# Patient Record
Sex: FEMALE | Race: WHITE | Employment: FULL TIME | URBAN - NONMETROPOLITAN AREA
[De-identification: names, ages, dates, MRNs, and addresses within clinical notes are randomized per-mention and may not be internally consistent; named-entity substitution may affect disease eponyms.]

---

## 2017-01-06 ENCOUNTER — OFFICE VISIT (OUTPATIENT)
Dept: FAMILY MEDICINE | Facility: OTHER | Age: 52
End: 2017-01-06
Attending: PHYSICIAN ASSISTANT
Payer: COMMERCIAL

## 2017-01-06 VITALS
RESPIRATION RATE: 18 BRPM | OXYGEN SATURATION: 98 % | SYSTOLIC BLOOD PRESSURE: 134 MMHG | DIASTOLIC BLOOD PRESSURE: 74 MMHG | HEIGHT: 65 IN | TEMPERATURE: 97.8 F | HEART RATE: 80 BPM

## 2017-01-06 DIAGNOSIS — I10 BENIGN ESSENTIAL HYPERTENSION: ICD-10-CM

## 2017-01-06 DIAGNOSIS — E66.01 MORBID OBESITY DUE TO EXCESS CALORIES (H): ICD-10-CM

## 2017-01-06 DIAGNOSIS — J40 BRONCHITIS: ICD-10-CM

## 2017-01-06 DIAGNOSIS — Z71.89 ACP (ADVANCE CARE PLANNING): Primary | Chronic | ICD-10-CM

## 2017-01-06 PROCEDURE — 99213 OFFICE O/P EST LOW 20 MIN: CPT | Performed by: PHYSICIAN ASSISTANT

## 2017-01-06 RX ORDER — PHENTERMINE HYDROCHLORIDE 30 MG/1
30 CAPSULE ORAL EVERY MORNING
Qty: 30 CAPSULE | Refills: 3 | Status: SHIPPED | OUTPATIENT
Start: 2017-01-06 | End: 2017-07-07

## 2017-01-06 RX ORDER — CODEINE PHOSPHATE AND GUAIFENESIN 10; 100 MG/5ML; MG/5ML
2 SOLUTION ORAL EVERY 4 HOURS PRN
Qty: 180 ML | Refills: 0 | Status: SHIPPED | OUTPATIENT
Start: 2017-01-06 | End: 2017-03-06

## 2017-01-06 RX ORDER — HYDROCHLOROTHIAZIDE 25 MG/1
TABLET ORAL
Qty: 90 TABLET | Refills: 0 | Status: SHIPPED | OUTPATIENT
Start: 2017-01-06 | End: 2017-07-06

## 2017-01-06 RX ORDER — AZITHROMYCIN 250 MG/1
TABLET, FILM COATED ORAL
Qty: 6 TABLET | Refills: 0 | Status: SHIPPED | OUTPATIENT
Start: 2017-01-06 | End: 2017-03-06

## 2017-01-06 RX ORDER — METOPROLOL SUCCINATE 50 MG/1
TABLET, EXTENDED RELEASE ORAL
Qty: 90 TABLET | Refills: 0 | Status: SHIPPED | OUTPATIENT
Start: 2017-01-06 | End: 2017-07-06

## 2017-01-06 ASSESSMENT — PAIN SCALES - GENERAL: PAINLEVEL: MODERATE PAIN (4)

## 2017-01-06 NOTE — MR AVS SNAPSHOT
"              After Visit Summary   1/6/2017    Nadeen Robles    MRN: [id number]           Patient Information     Date Of Birth          1965        Visit Information        Provider Department      1/6/2017 4:00 PM Sunitha Lindsey PA [facility]        Today's Diagnoses     ACP (advance care planning)    -  1     Benign essential hypertension         Morbid obesity due to excess calories (H)         Bronchitis           Care Instructions    Follow up if symptoms persist or worsen.          Follow-ups after your visit        Your next 10 appointments already scheduled     Jan 06, 2017  4:00 PM   (Arrive by 3:45 PM)   SHORT with RENETTA Stallworth   [facility]    [address] [address]   [phone]              Who to contact     If you have questions or need follow up information about today's clinic visit or your schedule please contact [facility] directly at [phone].  Normal or non-critical lab and imaging results will be communicated to you by MyChart, letter or phone within 4 business days after the clinic has received the results. If you do not hear from us within 7 days, please contact the clinic through Escom[employer] or phone. If you have a critical or abnormal lab result, we will notify you by phone as soon as possible.  Submit refill requests through [employer] or call your pharmacy and they will forward the refill request to us. Please allow 3 business days for your refill to be completed.          Additional Information About Your Visit        Escomhart Information     [employer] lets you send messages to your doctor, view your test results, renew your prescriptions, schedule appointments and more. To sign up, go to [email or web address] . Click on \"Log in\" on the left side of the screen, which will take you to the Welcome page. Then click on \"Sign up Now\" on the right side of the page.     You will be asked to " "enter the access code listed below, as well as some personal information. Please follow the directions to create your username and password.     Your access code is: [email or web address]  Expires: 2017  3:46 PM     Your access code will  in 90 days. If you need help or a new code, please call your [facility] clinic or [phone].        Care EveryWhere ID     This is your Care EveryWhere ID. This could be used by other organizations to access your [facility] medical records  [id number]        Your Vitals Were     Pulse Temperature Respirations Height Pulse Oximetry       80 97.8  F (36.6  C) (Tympanic) 18 5' 5\" (1.651 m) 98%        Blood Pressure from Last 3 Encounters:   17 134/74   16 120/66   10/02/15 136/76    Weight from Last 3 Encounters:   16 179 lb (81.194 kg)   10/02/15 202 lb (91.627 kg)   02/23/15 194 lb (87.998 kg)              Today, you had the following     No orders found for display         Today's Medication Changes          These changes are accurate as of: 17  3:46 PM.  If you have any questions, ask your nurse or doctor.               Start taking these medicines.        Dose/Directions    azithromycin 250 MG tablet   Commonly known as:  ZITHROMAX   Used for:  Bronchitis   Started by:  Sunitha Lindsey PA        Two tablets first day, then one tablet daily for four days.   Quantity:  6 tablet   Refills:  0       guaiFENesin-codeine 100-10 MG/5ML Soln solution   Commonly known as:  ROBITUSSIN AC   Used for:  Bronchitis   Started by:  Sunitha Lindsey PA        Dose:  2 tsp.   Take 10 mLs by mouth every 4 hours as needed   Quantity:  180 mL   Refills:  0         These medicines have changed or have updated prescriptions.        Dose/Directions    hydrochlorothiazide 25 MG tablet   Commonly known as:  HYDRODIURIL   This may have changed:  See the new instructions.   Used for:  Benign essential hypertension   Changed by:  Sunitha Lindsey PA        TAKE 1 TABLET DAILY " BY MOUTH   Quantity:  90 tablet   Refills:  0       metoprolol 50 MG 24 hr tablet   Commonly known as:  TOPROL-XL   This may have changed:  See the new instructions.   Used for:  Benign essential hypertension   Changed by:  Sunitha Lindsey PA        TAKE 1 TABLET (50MG) BY MOUTH DAILY.   Quantity:  90 tablet   Refills:  0            Where to get your medicines      These medications were sent to [id number] - [place], [place] - [address]  [address] [place] [address]     Phone:  [phone]    - azithromycin 250 MG tablet  - hydrochlorothiazide 25 MG tablet  - metoprolol 50 MG 24 hr tablet      Some of these will need a paper prescription and others can be bought over the counter.  Ask your nurse if you have questions.     Bring a paper prescription for each of these medications    - guaiFENesin-codeine 100-10 MG/5ML Soln solution  - phentermine 30 MG capsule             Primary Care Provider Office Phone # Fax #    RENETTA Stallworth [phone]       [facility] [address] [facility]  [place] [address]        Thank you!     Thank you for choosing [facility]  for your care. Our goal is always to provide you with excellent care. Hearing back from our patients is one way we can continue to improve our services. Please take a few minutes to complete the written survey that you may receive in the mail after your visit with us. Thank you!             Your Updated Medication List - Protect others around you: Learn how to safely use, store and throw away your medicines at www.disposemymeds.org.          This list is accurate as of: 1/6/17  3:46 PM.  Always use your most recent med list.                   Brand Name Dispense Instructions for use    azithromycin 250 MG tablet    ZITHROMAX    6 tablet    Two tablets first day, then one tablet daily for four days.       guaiFENesin-codeine 100-10 MG/5ML Soln solution    ROBITUSSIN AC    180 mL    Take 10 mLs by  mouth every 4 hours as needed       hydrochlorothiazide 25 MG tablet    HYDRODIURIL    90 tablet    TAKE 1 TABLET DAILY BY MOUTH       metoprolol 50 MG 24 hr tablet    TOPROL-XL    90 tablet    TAKE 1 TABLET (50MG) BY MOUTH DAILY.       phentermine 30 MG capsule     30 capsule    Take 1 capsule (30 mg) by mouth every morning

## 2017-01-06 NOTE — NURSING NOTE
"Chief Complaint   Patient presents with     URI     cough, sinus pressure and body aches for 2 weeks     *_* Health Care Directive *_*     paperwork given     Refill Request     needs all meds refilled (meds pended)       Initial /74 mmHg  Pulse 80  Temp(Src) 97.8  F (36.6  C) (Tympanic)  Resp 18  Ht 5' 5\" (1.651 m)  Wt   SpO2 98% Estimated body mass index is 29.79 kg/(m^2) as calculated from the following:    Height as of this encounter: 5' 5\" (1.651 m).    Weight as of 2/4/16: 179 lb (81.194 kg).  BP completed using cuff size: shanta Mckenna LPN      "

## 2017-01-06 NOTE — PROGRESS NOTES
Chief complaint:   Chief Complaint   Patient presents with     URI     cough, sinus pressure and body aches for 2 weeks     *_* Health Care Directive *_*     paperwork given     Refill Request     needs all meds refilled (meds pended)       Subjective: Nadeen Robles is a 51 year old female who presents with a  2 week  history of ST, nasal congestion, plugged ears, cough, SOB, wheeze. Chills and fever. Nausea but no vomiting, diarrhea.    PMH, PSH, FH reviewed and unchanged.    Current Outpatient Prescriptions   Medication Sig Dispense Refill     metoprolol (TOPROL-XL) 50 MG 24 hr tablet TAKE 1 TABLET (50MG) BY MOUTH DAILY. 90 tablet 0     hydrochlorothiazide (HYDRODIURIL) 25 MG tablet TAKE 1 TABLET DAILY BY MOUT H 90 tablet 0     phentermine 30 MG capsule Take 1 capsule (30 mg) by mouth every morning 30 capsule 3      Allergies   Allergen Reactions     Nkda [No Known Drug Allergies]        Review Of Systems  Skin: Denies rash  Eyes: Denies redness or discharge   Ears/Nose/Throat:as above  Respiratory: as above  Cardiovascular: Denies chest pain or palpitations   Gastrointestinal:Denies nausea, vomiting, diarrhea   Musculoskeletal: No myalgias    Objective:   B/P: 134/74, T: 97.8, P: 80, R: 18    Physical Exam  General: Alert orientated. NAD  Skin is unremarkable.  HEENT:Posterior pharynx erythematous. EAC's clear. Right TM intact. Left TM intact. Neck supple. Anterior cervical lymphadenopathy palpable.   Lungs: Scattered rhonchi throughout. No wheeze, rales   Cardiac: RRR    Assessment:   (Z71.89) ACP (advance care planning)  (primary encounter diagnosis)      (I10) Benign essential hypertension  Comment: refill. Return for physical  Plan: metoprolol (TOPROL-XL) 50 MG 24 hr tablet,         hydrochlorothiazide (HYDRODIURIL) 25 MG tablet            (E66.01) Morbid obesity due to excess calories (H)  Comment: refill  Plan: phentermine 30 MG capsule            (J40) Bronchit  Plan: azithromycin (ZITHROMAX) 250 MG  tablet,         guaiFENesin-codeine (ROBITUSSIN AC) 100-10         MG/5ML SOLN solution            Rest. Increase fluids. Tylenol for fever or discomfort. Return if symptoms persist or worsen.    Sunitha Lindsey PA-C

## 2017-03-06 ENCOUNTER — OFFICE VISIT (OUTPATIENT)
Dept: FAMILY MEDICINE | Facility: OTHER | Age: 52
End: 2017-03-06
Attending: FAMILY MEDICINE
Payer: COMMERCIAL

## 2017-03-06 VITALS
BODY MASS INDEX: 33.66 KG/M2 | SYSTOLIC BLOOD PRESSURE: 115 MMHG | RESPIRATION RATE: 16 BRPM | DIASTOLIC BLOOD PRESSURE: 76 MMHG | TEMPERATURE: 97.4 F | HEIGHT: 65 IN | WEIGHT: 202 LBS | HEART RATE: 74 BPM | OXYGEN SATURATION: 96 %

## 2017-03-06 DIAGNOSIS — J01.00 ACUTE NON-RECURRENT MAXILLARY SINUSITIS: Primary | ICD-10-CM

## 2017-03-06 PROCEDURE — 99213 OFFICE O/P EST LOW 20 MIN: CPT | Performed by: FAMILY MEDICINE

## 2017-03-06 ASSESSMENT — PAIN SCALES - GENERAL: PAINLEVEL: NO PAIN (0)

## 2017-03-06 NOTE — MR AVS SNAPSHOT
"              After Visit Summary   3/6/2017    Nadeen Robles    MRN: [id number]           Patient Information     Date Of Birth          1965        Visit Information        Provider Department      3/6/2017 9:30 AM Cristian Gamez MD [facility]        Today's Diagnoses     Acute non-recurrent maxillary sinusitis    -  1      Care Instructions    F/u with ongoing concerns.         Follow-ups after your visit        Who to contact     If you have questions or need follow up information about today's clinic visit or your schedule please contact [facility] directly at [phone].  Normal or non-critical lab and imaging results will be communicated to you by NeuroTronikhart, letter or phone within 4 business days after the clinic has received the results. If you do not hear from us within 7 days, please contact the clinic through [employer]Tronikh[employer] or phone. If you have a critical or abnormal lab result, we will notify you by phone as soon as possible.  Submit refill requests through [employer] or call your pharmacy and they will forward the refill request to us. Please allow 3 business days for your refill to be completed.          Additional Information About Your Visit        MyChart Information     [employer] lets you send messages to your doctor, view your test results, renew your prescriptions, schedule appointments and more. To sign up, go to [email or web address] . Click on \"Log in\" on the left side of the screen, which will take you to the Welcome page. Then click on \"Sign up Now\" on the right side of the page.     You will be asked to enter the access code listed below, as well as some personal information. Please follow the directions to create your username and password.     Your access code is: [email or web address]  Expires: 2017  3:46 PM     Your access code will  in 90 days. If you need help or a new code, please call your [facility] or [phone].        Care EveryWhere " "ID     This is your Care EveryWhere ID. This could be used by other organizations to access your [facility] medical records  [id number]        Your Vitals Were     Pulse Temperature Respirations Height Pulse Oximetry BMI (Body Mass Index)    74 97.4  F (36.3  C) (Tympanic) 16 5' 5\" (1.651 m) 96% 33.61 kg/m2       Blood Pressure from Last 3 Encounters:   03/06/17 115/76   01/06/17 134/74   02/04/16 120/66    Weight from Last 3 Encounters:   03/06/17 202 lb (91.6 kg)   02/04/16 179 lb (81.2 kg)   10/02/15 202 lb (91.6 kg)              Today, you had the following     No orders found for display         Today's Medication Changes          These changes are accurate as of: 3/6/17  9:39 AM.  If you have any questions, ask your nurse or doctor.               Start taking these medicines.        Dose/Directions    amoxicillin-clavulanate 875-125 MG per tablet   Commonly known as:  AUGMENTIN   Used for:  Acute non-recurrent maxillary sinusitis   Started by:  Cristian Gamez MD        Dose:  1 tablet   Take 1 tablet by mouth 2 times daily   Quantity:  20 tablet   Refills:  0            Where to get your medicines      These medications were sent to [id number] - [place] - [address]  [address] [address]     Phone:  [phone]     amoxicillin-clavulanate 875-125 MG per tablet                Primary Care Provider Office Phone # Fax #    RENETTA Stallworth [phone]       [facility] [place] [address]  [place] [address]        Thank you!     Thank you for choosing [facility]3]  for your care. Our goal is always to provide you with excellent care. Hearing back from our patients is one way we can continue to improve our services. Please take a few minutes to complete the written survey that you may receive in the mail after your visit with us. Thank you!             Your Updated Medication List - Protect others around you: Learn how to safely " use, store and throw away your medicines at www.disposemymeds.org.          This list is accurate as of: 3/6/17  9:39 AM.  Always use your most recent med list.                   Brand Name Dispense Instructions for use    amoxicillin-clavulanate 875-125 MG per tablet    AUGMENTIN    20 tablet    Take 1 tablet by mouth 2 times daily       hydrochlorothiazide 25 MG tablet    HYDRODIURIL    90 tablet    TAKE 1 TABLET DAILY BY MOUTH       metoprolol 50 MG 24 hr tablet    TOPROL-XL    90 tablet    TAKE 1 TABLET (50MG) BY MOUTH DAILY.       phentermine 30 MG capsule     30 capsule    Take 1 capsule (30 mg) by mouth every morning

## 2017-03-06 NOTE — NURSING NOTE
"Chief Complaint   Patient presents with     URI     Pt has a cough and stuffy head with bloody sinus drainage for 3 days. Pt had Bronchitis in January and this never completely cleared up. The cough had cleared up but the head congestion continued.       Initial /76 (BP Location: Right arm, Patient Position: Chair, Cuff Size: Adult Regular)  Pulse 74  Temp 97.4  F (36.3  C) (Tympanic)  Resp 16  Ht 5' 5\" (1.651 m)  Wt 202 lb (91.6 kg)  SpO2 96%  BMI 33.61 kg/m2 Estimated body mass index is 33.61 kg/(m^2) as calculated from the following:    Height as of this encounter: 5' 5\" (1.651 m).    Weight as of this encounter: 202 lb (91.6 kg).  Medication Reconciliation: complete   Jonna Evangelista    "

## 2017-03-06 NOTE — PROGRESS NOTES
Nadeen Robles    March 6, 2017    Chief Complaint   Patient presents with     URI     Pt has a cough and stuffy head with bloody sinus drainage for 3 days. Pt had Bronchitis in January and this never completely cleared up. The cough had cleared up but the head congestion continued.       SUBJECTIVE:  Here for ongoing URI sx.  Pain and cough at night sometimes.  Very congested.  No sob.  Finished zpac about a month ago, slightly better but now back.      Past Medical History   Diagnosis Date     Autoimmune hepatitis (H)      HTN (hypertension)      Migraine      Obesity        Past Surgical History   Procedure Laterality Date     C removal gallbladder  1990     Cholecystectomy  1991       Current Outpatient Prescriptions   Medication Sig Dispense Refill     amoxicillin-clavulanate (AUGMENTIN) 875-125 MG per tablet Take 1 tablet by mouth 2 times daily 20 tablet 0     metoprolol (TOPROL-XL) 50 MG 24 hr tablet TAKE 1 TABLET (50MG) BY MOUTH DAILY. 90 tablet 0     hydrochlorothiazide (HYDRODIURIL) 25 MG tablet TAKE 1 TABLET DAILY BY MOUTH 90 tablet 0     phentermine 30 MG capsule Take 1 capsule (30 mg) by mouth every morning 30 capsule 3       Allergies   Allergen Reactions     Nkda [No Known Drug Allergies]        Family History   Problem Relation Age of Onset     DIABETES Mother      DIABETES Father      CEREBROVASCULAR DISEASE Father 75     Cardiovascular Father 75     heart attack       Social History     Social History     Marital status:      Spouse name: N/A     Number of children: N/A     Years of education: N/A     Occupational History     Not on file.     Social History Main Topics     Smoking status: Never Smoker     Smokeless tobacco: Never Used      Comment: no 2nd hand smoke exposure     Alcohol use Yes      Comment: rarely     Drug use: No     Sexual activity: Yes     Partners: Male     Other Topics Concern     Caffeine Concern No     Social History Narrative       5 point ROS negative except as  noted above in HPI, including Gen., Resp., CV, GI &  system review.     OBJECTIVE:  B/P: 115/76, T: 97.4, P: 74, R: 16    GENERAL APPEARANCE: Alert, no acute distress  HEENT: entirely normal.   CV: regular rate and rhythm, no murmur, rub or gallop  RESP: lungs clear to auscultation bilaterally  ABDOMEN: normal bowel sounds, soft, nontender, no hepatosplenomegaly or other masses  SKIN: no suspicious lesions or rashes to visualized skin  NEURO: Alert, oriented x 3, speech and mentation normal    ASSESSMENT and PLAN:  (J01.00) Acute non-recurrent maxillary sinusitis  (primary encounter diagnosis)  Comment: discussed.    Plan: amoxicillin-clavulanate (AUGMENTIN) 875-125 MG         per tablet        Suspect this as cause of sx.  Discussed tx course.  augmentin and follow up with ongoing concerns.  Stable presentation otherwise.

## 2017-03-14 ENCOUNTER — TELEPHONE (OUTPATIENT)
Dept: FAMILY MEDICINE | Facility: OTHER | Age: 52
End: 2017-03-14

## 2017-03-14 ENCOUNTER — OFFICE VISIT (OUTPATIENT)
Dept: FAMILY MEDICINE | Facility: OTHER | Age: 52
End: 2017-03-14
Attending: NURSE PRACTITIONER
Payer: COMMERCIAL

## 2017-03-14 VITALS
HEART RATE: 77 BPM | OXYGEN SATURATION: 98 % | WEIGHT: 200 LBS | SYSTOLIC BLOOD PRESSURE: 124 MMHG | BODY MASS INDEX: 33.32 KG/M2 | DIASTOLIC BLOOD PRESSURE: 80 MMHG | TEMPERATURE: 97.7 F | HEIGHT: 65 IN | RESPIRATION RATE: 22 BRPM

## 2017-03-14 DIAGNOSIS — J20.9 ACUTE BRONCHITIS, UNSPECIFIED ORGANISM: Primary | ICD-10-CM

## 2017-03-14 PROCEDURE — 99213 OFFICE O/P EST LOW 20 MIN: CPT | Performed by: NURSE PRACTITIONER

## 2017-03-14 RX ORDER — BENZONATATE 100 MG/1
100 CAPSULE ORAL 3 TIMES DAILY PRN
Qty: 42 CAPSULE | Refills: 0 | Status: SHIPPED | OUTPATIENT
Start: 2017-03-14 | End: 2017-07-07

## 2017-03-14 RX ORDER — PREDNISONE 20 MG/1
40 TABLET ORAL DAILY
Qty: 10 TABLET | Refills: 0 | Status: SHIPPED | OUTPATIENT
Start: 2017-03-14 | End: 2017-03-19

## 2017-03-14 ASSESSMENT — PAIN SCALES - GENERAL: PAINLEVEL: MILD PAIN (2)

## 2017-03-14 NOTE — PROGRESS NOTES
SUBJECTIVE:                                                    Nadeen Robles is a 51 year old female who presents to clinic today for the following health issues:      RESPIRATORY SYMPTOMS      Duration: one week    Description  nasal congestion, cough and mild chest and back pain andinto legs    Severity: moderate    Accompanying signs and symptoms: wheezing is new, and chest pressure    History (predisposing factors):  none    Precipitating or alleviating factors: None    Therapies tried and outcome:  Rupal Senior was seen and treated for a sinus infection last with with Augmentin. She states her sinus congestion is better, but she has this cough that will not stop and is worse at night. She is taking OTC cough medicine without any relief.    Problem list and histories reviewed & adjusted, as indicated.  Additional history: as documented    Patient Active Problem List   Diagnosis     Obesity     CARDIOVASCULAR SCREENING; LDL GOAL LESS THAN 160     Hypertension goal BP (blood pressure) < 140/90     Hypokalemia     ACP (advance care planning)     Past Surgical History   Procedure Laterality Date     C removal gallbladder  1990     Cholecystectomy  1991       Social History   Substance Use Topics     Smoking status: Never Smoker     Smokeless tobacco: Never Used      Comment: no 2nd hand smoke exposure     Alcohol use Yes      Comment: rarely     Family History   Problem Relation Age of Onset     DIABETES Mother      DIABETES Father      CEREBROVASCULAR DISEASE Father 75     Cardiovascular Father 75     heart attack         Current Outpatient Prescriptions   Medication Sig Dispense Refill     amoxicillin-clavulanate (AUGMENTIN) 875-125 MG per tablet Take 1 tablet by mouth 2 times daily 20 tablet 0     metoprolol (TOPROL-XL) 50 MG 24 hr tablet TAKE 1 TABLET (50MG) BY MOUTH DAILY. 90 tablet 0     hydrochlorothiazide (HYDRODIURIL) 25 MG tablet TAKE 1 TABLET DAILY BY MOUTH 90 tablet 0     phentermine 30  "MG capsule Take 1 capsule (30 mg) by mouth every morning 30 capsule 3     Allergies   Allergen Reactions     Nkda [No Known Drug Allergies]        Reviewed and updated as needed this visit by clinical staff  Tobacco  Allergies  Meds  Med Hx  Surg Hx  Fam Hx  Soc Hx      Reviewed and updated as needed this visit by Provider         ROS:  C: NEGATIVE for fever, chills, change in weight  ENT/MOUTH: Hx sinus infections  RESP:cough-non productive, SOB/dyspnea and wheezing  CV: NEGATIVE for chest pain, palpitations or peripheral edema    OBJECTIVE:                                                    /80 (BP Location: Right arm, Patient Position: Chair, Cuff Size: Adult Regular)  Pulse 77  Temp 97.7  F (36.5  C) (Tympanic)  Resp 22  Ht 5' 5\" (1.651 m)  Wt 200 lb (90.7 kg)  SpO2 98%  BMI 33.28 kg/m2  Body mass index is 33.28 kg/(m^2).   GENERAL: healthy, alert and no distress  NECK: no adenopathy, no asymmetry, masses, or scars and thyroid normal to palpation  RESP: lungs clear to auscultation - no rales, rhonchi or wheezes - decreased breath sounds bilateral bases  CV: regular rate and rhythm, normal S1 S2, no S3 or S4, no murmur, click or rub, no peripheral edema and peripheral pulses strong  ABDOMEN: soft, nontender, no hepatosplenomegaly, no masses and bowel sounds normal  PSYCH: mentation appears normal, affect normal/bright  LYMPH: normal ant/post cervical, supraclavicular nodes    Diagnostic Test Results:  none      ASSESSMENT:                                                        PLAN:                                                    ASSESSMENT / PLAN:  (J20.9) Acute bronchitis, unspecified organism  (primary encounter diagnosis)  Comment:   Plan:  predniSONE (DELTASONE) 20 MG tablet,          benzonatate (TESSALON) 100 MG capsule         Stay hydrated   Cool mist humidifier    Follow up if no improvement or worsening symptoms        Akila Gonzalez, JUAN PABLO [facility]    "

## 2017-03-14 NOTE — PATIENT INSTRUCTIONS
Acute Bronchitis  Your healthcare provider has told you that you have acute bronchitis. Bronchitis is infection or inflammation of the bronchial tubes (airways in the lungs). Normally, air moves easily in and out of the airways. Bronchitis narrows the airways, making it harder for air to flow in and out of the lungs. This causes symptoms such as shortness of breath, coughing, and wheezing. Bronchitis can be  acute  or  chronic.  Acute means the condition comes on quickly and goes away in a short time. Chronic means a condition lasts a long time and often comes back. Read on to learn more about acute bronchitis.    What causes acute bronchitis?  Acute bronchitis almost always starts as a viral respiratory infection, such as a cold or the flu. Certain factors make it more likely for a cold or flu to turn into bronchitis. These include being very young or very old or having a heart or lung problem. Cigarette smoking also makes bronchitis more likely.  When bronchitis develops, the airways become swollen. The airways may also become infected with bacteria. This is known as a secondary infection.  Diagnosing acute bronchitis  Your healthcare provider will examine you and ask about your symptoms and health history. You may also have a sputum culture to test the fluid in your lungs. Chest X-rays may be done to look for infection in the lungs.  Treating acute bronchitis  Bronchitis usually clears up as the cold or flu goes away. You can help feel better faster by doing the following:    Take medicine as directed. You may be told to take ibuprofen or other over-the-counter medicines. These help relieve inflammation in your bronchial tubes. Your doctor may prescribe an inhaler to help open up the bronchial tubes. Most of the time, acute bronchitis is caused by a viral infection. Antibiotics are usually not prescribed for viral infections.    Drink plenty of fluids, such as water, juice, or warm soup. Fluids loosen mucus so  that you can cough it up. This helps you breathe more easily. Fluids also prevent dehydration.    Make sure you get plenty of rest.    Do not smoke. Do not allow anyone else to smoke in your home.  Recovery and follow-up  Follow up with your doctor as you are told. You will likely feel better in a week or two. But a dry cough can linger beyond that time. Let your doctor know if you still have symptoms (other than a dry cough) after 2 weeks. If you re prone to getting bronchial infections, let your doctor know. And take steps to protect yourself from future infections. These steps include stopping smoking and avoiding tobacco smoke, washing your hands often, and getting a yearly flu shot.  When to call the doctor  Call the doctor if you have any of the following:    Fever of 100.4 F (38.0 C) higher    Symptoms that get worse, or new symptoms    Trouble breathing    Symptoms that don t start to improve within a week, or within 3 days of taking antibiotics     0221-1874 [employer]. [address] [place] [address], [place] [address]. All rights reserved. This information is not intended as a substitute for professional medical care. Always follow your healthcare professional's instructions.

## 2017-03-14 NOTE — TELEPHONE ENCOUNTER
I received an email on Cover My Meds that a Prior Auth needed to be completed for patient's Phentermine HCl 30mg caps.  I called the patient to let her know that we were trying to complete a Prior Auth for her med and I had a few questions for her to finish the form online and she said we didn't need to complete it because her insurance will not cover it and she pays for it out of pocket.  I deleted the PA request from Cover My Meds with a note stating the same thing.  No PA will be done at this time.  Venus Mckenna LPN

## 2017-03-14 NOTE — NURSING NOTE
"Estimated body mass index is 33.28 kg/(m^2) as calculated from the following:    Height as of this encounter: 5' 5\" (1.651 m).    Weight as of this encounter: 200 lb (90.7 kg).  BP Readings from Last 1 Encounters:   03/14/17 124/80   ]  BP cuff size:  regular  Do you feel safe in your environment? yes  Does the patient need any medication refills today? No  Tana Rey    "

## 2017-03-14 NOTE — MR AVS SNAPSHOT
After Visit Summary   3/14/2017    Nadeen Robles    MRN: [id number]           Patient Information     Date Of Birth          1965        Visit Information        Provider Department      3/14/2017 10:40 AM Akila Gonzalez APRN [facility]        Today's Diagnoses     Acute bronchitis, unspecified organism    -  1      Care Instructions      Acute Bronchitis  Your healthcare provider has told you that you have acute bronchitis. Bronchitis is infection or inflammation of the bronchial tubes (airways in the lungs). Normally, air moves easily in and out of the airways. Bronchitis narrows the airways, making it harder for air to flow in and out of the lungs. This causes symptoms such as shortness of breath, coughing, and wheezing. Bronchitis can be  acute  or  chronic.  Acute means the condition comes on quickly and goes away in a short time. Chronic means a condition lasts a long time and often comes back. Read on to learn more about acute bronchitis.    What causes acute bronchitis?  Acute bronchitis almost always starts as a viral respiratory infection, such as a cold or the flu. Certain factors make it more likely for a cold or flu to turn into bronchitis. These include being very young or very old or having a heart or lung problem. Cigarette smoking also makes bronchitis more likely.  When bronchitis develops, the airways become swollen. The airways may also become infected with bacteria. This is known as a secondary infection.  Diagnosing acute bronchitis  Your healthcare provider will examine you and ask about your symptoms and health history. You may also have a sputum culture to test the fluid in your lungs. Chest X-rays may be done to look for infection in the lungs.  Treating acute bronchitis  Bronchitis usually clears up as the cold or flu goes away. You can help feel better faster by doing the following:    Take medicine as directed. You may be told to take  ibuprofen or other over-the-counter medicines. These help relieve inflammation in your bronchial tubes. Your doctor may prescribe an inhaler to help open up the bronchial tubes. Most of the time, acute bronchitis is caused by a viral infection. Antibiotics are usually not prescribed for viral infections.    Drink plenty of fluids, such as water, juice, or warm soup. Fluids loosen mucus so that you can cough it up. This helps you breathe more easily. Fluids also prevent dehydration.    Make sure you get plenty of rest.    Do not smoke. Do not allow anyone else to smoke in your home.  Recovery and follow-up  Follow up with your doctor as you are told. You will likely feel better in a week or two. But a dry cough can linger beyond that time. Let your doctor know if you still have symptoms (other than a dry cough) after 2 weeks. If you re prone to getting bronchial infections, let your doctor know. And take steps to protect yourself from future infections. These steps include stopping smoking and avoiding tobacco smoke, washing your hands often, and getting a yearly flu shot.  When to call the doctor  Call the doctor if you have any of the following:    Fever of 100.4 F (38.0 C) higher    Symptoms that get worse, or new symptoms    Trouble breathing    Symptoms that don t start to improve within a week, or within 3 days of taking antibiotics     2541-2155 [employer]. [address] [place] [address], [place] [id number]. All rights reserved. This information is not intended as a substitute for professional medical care. Always follow your healthcare professional's instructions.              Follow-ups after your visit        Follow-up notes from your care team     Return if symptoms worsen or fail to improve.      Who to contact     If you have questions or need follow up information about today's clinic visit or your schedule please contact [facility] directly at [phone].  Normal or non-critical  "lab and imaging results will be communicated to you by MyChart, letter or phone within 4 business days after the clinic has received the results. If you do not hear from us within 7 days, please contact the clinic through [employer] or phone. If you have a critical or abnormal lab result, we will notify you by phone as soon as possible.  Submit refill requests through [employer] or call your pharmacy and they will forward the refill request to us. Please allow 3 business days for your refill to be completed.          Additional Information About Your Visit        [employer] Information     [employer] lets you send messages to your doctor, view your test results, renew your prescriptions, schedule appointments and more. To sign up, go to [email or web address] . Click on \"Log in\" on the left side of the screen, which will take you to the Welcome page. Then click on \"Sign up Now\" on the right side of the page.     You will be asked to enter the access code listed below, as well as some personal information. Please follow the directions to create your username and password.     Your access code is: [email or web address]  Expires: 2017  4:46 PM     Your access code will  in 90 days. If you need help or a new code, please call your [place] clinic or [phone].        Care EveryWhere ID     This is your Care EveryWhere ID. This could be used by other organizations to access your [place] medical records  [id number]        Your Vitals Were     Pulse Temperature Respirations Height Pulse Oximetry BMI (Body Mass Index)    77 97.7  F (36.5  C) (Tympanic) 22 5' 5\" (1.651 m) 98% 33.28 kg/m2       Blood Pressure from Last 3 Encounters:   17 124/80   17 115/76   17 134/74    Weight from Last 3 Encounters:   17 200 lb (90.7 kg)   17 202 lb (91.6 kg)   16 179 lb (81.2 kg)              Today, you had the following     No orders found for display         Today's Medication Changes          These " changes are accurate as of: 3/14/17 10:51 AM.  If you have any questions, ask your nurse or doctor.               Start taking these medicines.        Dose/Directions    benzonatate 100 MG capsule   Commonly known as:  TESSALON   Used for:  Acute bronchitis, unspecified organism   Started by:  Akila Gonzalez APRN CNP        Dose:  100 mg   Take 1 capsule (100 mg) by mouth 3 times daily as needed for cough   Quantity:  42 capsule   Refills:  0       predniSONE 20 MG tablet   Commonly known as:  DELTASONE   Used for:  Acute bronchitis, unspecified organism   Started by:  Akila Gonzalez APRN CNP        Dose:  40 mg   Take 2 tablets (40 mg) by mouth daily for 5 days   Quantity:  10 tablet   Refills:  0            Where to get your medicines      These medications were sent to [id number] - [place], [place] - [address]  [address], [place] [address]     Phone:  [phone]     benzonatate 100 MG capsule    predniSONE 20 MG tablet                Primary Care Provider Office Phone # Fax #    RENETTA Stallworth [phone]       [facility] [place] [address]  [place] [address]        Thank you!     Thank you for choosing [facility]  for your care. Our goal is always to provide you with excellent care. Hearing back from our patients is one way we can continue to improve our services. Please take a few minutes to complete the written survey that you may receive in the mail after your visit with us. Thank you!             Your Updated Medication List - Protect others around you: Learn how to safely use, store and throw away your medicines at www.disposemymeds.org.          This list is accurate as of: 3/14/17 10:51 AM.  Always use your most recent med list.                   Brand Name Dispense Instructions for use    amoxicillin-clavulanate 875-125 MG per tablet    AUGMENTIN    20 tablet    Take 1 tablet by mouth 2 times daily        benzonatate 100 MG capsule    TESSALON    42 capsule    Take 1 capsule (100 mg) by mouth 3 times daily as needed for cough       hydrochlorothiazide 25 MG tablet    HYDRODIURIL    90 tablet    TAKE 1 TABLET DAILY BY MOUTH       metoprolol 50 MG 24 hr tablet    TOPROL-XL    90 tablet    TAKE 1 TABLET (50MG) BY MOUTH DAILY.       phentermine 30 MG capsule     30 capsule    Take 1 capsule (30 mg) by mouth every morning       predniSONE 20 MG tablet    DELTASONE    10 tablet    Take 2 tablets (40 mg) by mouth daily for 5 days

## 2017-03-14 NOTE — TELEPHONE ENCOUNTER
7:36 AM  Patient is having the following symptoms: cough cant sleepThe patient is requesting an appointment for [facility] was seen last Monday possible bronchitis    Reason for Call: OVERBOOK    Was an appointment offered for this call?no  Preferred method for responding to this message: telephone call  If we cannot reach you directly, may we leave a detailed response at the number you provided? yes    Can this message wait until your PCP/provider returns, if unavailable today? liliana Manriquez

## 2017-03-16 ENCOUNTER — TRANSFERRED RECORDS (OUTPATIENT)
Dept: HEALTH INFORMATION MANAGEMENT | Facility: HOSPITAL | Age: 52
End: 2017-03-16

## 2017-03-16 ENCOUNTER — OFFICE VISIT (OUTPATIENT)
Dept: FAMILY MEDICINE | Facility: OTHER | Age: 52
End: 2017-03-16
Attending: FAMILY MEDICINE
Payer: COMMERCIAL

## 2017-03-16 VITALS
TEMPERATURE: 98.4 F | OXYGEN SATURATION: 97 % | HEART RATE: 79 BPM | RESPIRATION RATE: 18 BRPM | WEIGHT: 200 LBS | SYSTOLIC BLOOD PRESSURE: 124 MMHG | HEIGHT: 65 IN | DIASTOLIC BLOOD PRESSURE: 70 MMHG | BODY MASS INDEX: 33.32 KG/M2

## 2017-03-16 DIAGNOSIS — R05.9 COUGH: Primary | ICD-10-CM

## 2017-03-16 DIAGNOSIS — R11.10 POST-TUSSIVE EMESIS: ICD-10-CM

## 2017-03-16 LAB
BASOPHILS # BLD AUTO: 0 10E9/L (ref 0–0.2)
BASOPHILS NFR BLD AUTO: 0.4 %
DIFFERENTIAL METHOD BLD: ABNORMAL
EOSINOPHIL # BLD AUTO: 0 10E9/L (ref 0–0.7)
EOSINOPHIL NFR BLD AUTO: 0.2 %
ERYTHROCYTE [DISTWIDTH] IN BLOOD BY AUTOMATED COUNT: 13.9 % (ref 10–15)
HCT VFR BLD AUTO: 39.7 % (ref 35–47)
HGB BLD-MCNC: 13.5 G/DL (ref 11.7–15.7)
LYMPHOCYTES # BLD AUTO: 0.6 10E9/L (ref 0.8–5.3)
LYMPHOCYTES NFR BLD AUTO: 10.8 %
MCH RBC QN AUTO: 28.5 PG (ref 26.5–33)
MCHC RBC AUTO-ENTMCNC: 34 G/DL (ref 31.5–36.5)
MCV RBC AUTO: 84 FL (ref 78–100)
MISCELLANEOUS TEST: NORMAL
MONOCYTES # BLD AUTO: 0.5 10E9/L (ref 0–1.3)
MONOCYTES NFR BLD AUTO: 8.1 %
NEUTROPHILS # BLD AUTO: 4.5 10E9/L (ref 1.6–8.3)
NEUTROPHILS NFR BLD AUTO: 80.5 %
PLATELET # BLD AUTO: 250 10E9/L (ref 150–450)
RBC # BLD AUTO: 4.73 10E12/L (ref 3.8–5.2)
WBC # BLD AUTO: 5.6 10E9/L (ref 4–11)

## 2017-03-16 PROCEDURE — 85025 COMPLETE CBC W/AUTO DIFF WBC: CPT | Performed by: FAMILY MEDICINE

## 2017-03-16 PROCEDURE — 71020 ZZHC CHEST TWO VIEWS, FRONT/LAT: CPT | Mod: TC | Performed by: RADIOLOGY

## 2017-03-16 PROCEDURE — 99214 OFFICE O/P EST MOD 30 MIN: CPT | Performed by: FAMILY MEDICINE

## 2017-03-16 PROCEDURE — 36415 COLL VENOUS BLD VENIPUNCTURE: CPT | Performed by: FAMILY MEDICINE

## 2017-03-16 RX ORDER — ALBUTEROL SULFATE 90 UG/1
2 AEROSOL, METERED RESPIRATORY (INHALATION) EVERY 6 HOURS PRN
Qty: 1 INHALER | Refills: 0 | Status: SHIPPED | OUTPATIENT
Start: 2017-03-16 | End: 2017-07-07

## 2017-03-16 RX ORDER — AZITHROMYCIN 250 MG/1
TABLET, FILM COATED ORAL
Qty: 6 TABLET | Refills: 0 | Status: SHIPPED | OUTPATIENT
Start: 2017-03-16 | End: 2017-07-07

## 2017-03-16 ASSESSMENT — PAIN SCALES - GENERAL: PAINLEVEL: MODERATE PAIN (4)

## 2017-03-16 NOTE — NURSING NOTE
"Chief Complaint   Patient presents with     Cough     not better - was treated for bronchitis on 3/14/17       Initial /70 (BP Location: Right arm, Patient Position: Chair, Cuff Size: Adult Large)  Pulse 79  Temp 98.4  F (36.9  C) (Tympanic)  Resp 18  Ht 5' 5\" (1.651 m)  Wt 200 lb (90.7 kg)  SpO2 97%  BMI 33.28 kg/m2 Estimated body mass index is 33.28 kg/(m^2) as calculated from the following:    Height as of this encounter: 5' 5\" (1.651 m).    Weight as of this encounter: 200 lb (90.7 kg).  Medication Reconciliation: complete   Venus Mckenna LPN      "

## 2017-03-16 NOTE — PROGRESS NOTES
Nadeen Robles    March 16, 2017    Chief Complaint   Patient presents with     Cough     not better - was treated for bronchitis on 3/14/17       SUBJECTIVE:  Here for ongoing severe cough.  She coughed to the point of almost passing out.  She couldn't get her breath.  Then, she had a cough until she vomited.  Feels ok otherwise.  Just finished prednisone.  Was tx with augmentin though this as well.  Lengthy review today.      Past Medical History   Diagnosis Date     Autoimmune hepatitis (H)      HTN (hypertension)      Migraine      Obesity        Past Surgical History   Procedure Laterality Date     C removal gallbladder  1990     Cholecystectomy  1991       Current Outpatient Prescriptions   Medication Sig Dispense Refill     predniSONE (DELTASONE) 20 MG tablet Take 2 tablets (40 mg) by mouth daily for 5 days 10 tablet 0     benzonatate (TESSALON) 100 MG capsule Take 1 capsule (100 mg) by mouth 3 times daily as needed for cough 42 capsule 0     metoprolol (TOPROL-XL) 50 MG 24 hr tablet TAKE 1 TABLET (50MG) BY MOUTH DAILY. 90 tablet 0     hydrochlorothiazide (HYDRODIURIL) 25 MG tablet TAKE 1 TABLET DAILY BY MOUTH 90 tablet 0     phentermine 30 MG capsule Take 1 capsule (30 mg) by mouth every morning 30 capsule 3       Allergies   Allergen Reactions     Nkda [No Known Drug Allergies]        Family History   Problem Relation Age of Onset     DIABETES Mother      DIABETES Father      CEREBROVASCULAR DISEASE Father 75     Cardiovascular Father 75     heart attack       Social History     Social History     Marital status:      Spouse name: N/A     Number of children: N/A     Years of education: N/A     Occupational History     Not on file.     Social History Main Topics     Smoking status: Never Smoker     Smokeless tobacco: Never Used      Comment: no 2nd hand smoke exposure     Alcohol use Yes      Comment: rarely     Drug use: No     Sexual activity: Yes     Partners: Male     Other Topics Concern      Caffeine Concern No     Social History Narrative       5 point ROS negative except as noted above in HPI, including Gen., Resp., CV, GI &  system review.     OBJECTIVE:  B/P: 124/70, T: 98.4, P: 79, R: 18    GENERAL APPEARANCE: Alert, no acute distress  HEENT:  Normal.    CV: regular rate and rhythm, no murmur, rub or gallop  RESP: lungs clear to auscultation bilaterally  ABDOMEN: normal bowel sounds, soft, nontender, no hepatosplenomegaly or other masses  SKIN: no suspicious lesions or rashes to visualized skin  NEURO: Alert, oriented x 3, speech and mentation normal    ASSESSMENT and PLAN:  (R05) Cough  (primary encounter diagnosis)  Comment: ongoing, severe.  Plan: XR CHEST 2 VW (Clinic Performed), Bordetella         pert parapert DNA pcr        She has coughed to the point of almost passing out and, also post tussive emesis.  Getting pertussis checked.  Normal cbc and xray with question of granuloma RUL.  Await rad read.  Using azithro due to possibility of pertussis by history.  Normal sats and vitals otherwise.     (R11.10) Post-tussive emesis  Comment: as above.    Plan: XR CHEST 2 VW (Clinic Performed), Bordetella         pert parapert DNA pcr        As above.

## 2017-03-16 NOTE — MR AVS SNAPSHOT
"              After Visit Summary   3/16/2017    Nadeen Robles    MRN: [id number]           Patient Information     Date Of Birth          1965        Visit Information        Provider Department      3/16/2017 11:15 AM Cristian Gamez MD [facility]        Today's Diagnoses     Cough    -  1    Post-tussive emesis          Care Instructions    F/u with ongoing concerns.         Follow-ups after your visit        Who to contact     If you have questions or need follow up information about today's clinic visit or your schedule please contact [facility] directly at [phone].  Normal or non-critical lab and imaging results will be communicated to you by MyChart, letter or phone within 4 business days after the clinic has received the results. If you do not hear from us within 7 days, please contact the clinic through [employer] or phone. If you have a critical or abnormal lab result, we will notify you by phone as soon as possible.  Submit refill requests through [employer]up or call your pharmacy and they will forward the refill request to us. Please allow 3 business days for your refill to be completed.          Additional Information About Your Visit        MyChart Information     markedup lets you send messages to your doctor, view your test results, renew your prescriptions, schedule appointments and more. To sign up, go to [email or web address] . Click on \"Log in\" on the left side of the screen, which will take you to the Welcome page. Then click on \"Sign up Now\" on the right side of the page.     You will be asked to enter the access code listed below, as well as some personal information. Please follow the directions to create your username and password.     Your access code is: [email or web address]  Expires: 2017  4:46 PM     Your access code will  in 90 days. If you need help or a new code, please call your [facility] or [phone].        Care EveryWhere ID  " "   This is your Care EveryWhere ID. This could be used by other organizations to access your [place] medical records  [id number]        Your Vitals Were     Pulse Temperature Respirations Height Pulse Oximetry BMI (Body Mass Index)    79 98.4  F (36.9  C) (Tympanic) 18 5' 5\" (1.651 m) 97% 33.28 kg/m2       Blood Pressure from Last 3 Encounters:   03/16/17 124/70   03/14/17 124/80   03/06/17 115/76    Weight from Last 3 Encounters:   03/16/17 200 lb (90.7 kg)   03/14/17 200 lb (90.7 kg)   03/06/17 202 lb (91.6 kg)              We Performed the Following     Bordatella Pertussis: Laboratory Miscellaneous Order     CBC with platelets and differential     Send outs misc test     XR CHEST 2 VW (Clinic Performed)          Today's Medication Changes          These changes are accurate as of: 3/16/17 12:43 PM.  If you have any questions, ask your nurse or doctor.               Start taking these medicines.        Dose/Directions    albuterol 108 (90 BASE) MCG/ACT Inhaler   Commonly known as:  PROAIR HFA/PROVENTIL HFA/VENTOLIN HFA   Used for:  Cough   Started by:  Cristian Gamez MD        Dose:  2 puff   Inhale 2 puffs into the lungs every 6 hours as needed for shortness of breath / dyspnea or wheezing   Quantity:  1 Inhaler   Refills:  0       azithromycin 250 MG tablet   Commonly known as:  ZITHROMAX   Used for:  Cough, Post-tussive emesis   Started by:  Cristian Gamez MD        Two tablets first day, then one tablet daily for four days.   Quantity:  6 tablet   Refills:  0            Where to get your medicines      These medications were sent to [facility] - [place], [place] - [address]  [address] [place] [address]     Phone:  [phone]     albuterol 108 (90 BASE) MCG/ACT Inhaler    azithromycin 250 MG tablet                Primary Care Provider Office Phone # Fax #    RENETTA Stallworth [phone]       [facility] [place] [address] [place] [address]   "      Thank you!     Thank you for choosing [facility]  for your care. Our goal is always to provide you with excellent care. Hearing back from our patients is one way we can continue to improve our services. Please take a few minutes to complete the written survey that you may receive in the mail after your visit with us. Thank you!             Your Updated Medication List - Protect others around you: Learn how to safely use, store and throw away your medicines at www.disposemymeds.org.          This list is accurate as of: 3/16/17 12:43 PM.  Always use your most recent med list.                   Brand Name Dispense Instructions for use    albuterol 108 (90 BASE) MCG/ACT Inhaler    PROAIR HFA/PROVENTIL HFA/VENTOLIN HFA    1 Inhaler    Inhale 2 puffs into the lungs every 6 hours as needed for shortness of breath / dyspnea or wheezing       azithromycin 250 MG tablet    ZITHROMAX    6 tablet    Two tablets first day, then one tablet daily for four days.       benzonatate 100 MG capsule    TESSALON    42 capsule    Take 1 capsule (100 mg) by mouth 3 times daily as needed for cough       hydrochlorothiazide 25 MG tablet    HYDRODIURIL    90 tablet    TAKE 1 TABLET DAILY BY MOUTH       metoprolol 50 MG 24 hr tablet    TOPROL-XL    90 tablet    TAKE 1 TABLET (50MG) BY MOUTH DAILY.       phentermine 30 MG capsule     30 capsule    Take 1 capsule (30 mg) by mouth every morning       predniSONE 20 MG tablet    DELTASONE    10 tablet    Take 2 tablets (40 mg) by mouth daily for 5 days

## 2017-03-20 ENCOUNTER — TELEPHONE (OUTPATIENT)
Dept: FAMILY MEDICINE | Facility: OTHER | Age: 52
End: 2017-03-20

## 2017-03-20 NOTE — TELEPHONE ENCOUNTER
9:04 AM    Reason for Call: Phone Call    Description: Tracy states that she needs a work excuse for March  15 - 17 .  Please call Tracy when this is ready.  She Saw Dr. Gamez and Akila Gonzalez    Was an appointment offered for this call? No    Preferred method for responding to this message: [phone]    If we cannot reach you directly, may we leave a detailed response at the number you provided? Yes      Claudette Muhammad

## 2017-03-20 NOTE — LETTER
[facility]  [address]  [facility] [address]  [phone]  Dept: [phone]      3/20/2017    Re: Nadeen Robles      TO WHOM IT MAY CONCERN:    Nadeen Robles  was seen on 06/3/16/17.  Please excuse her  From 03/15/17 - 03/17/17 due to illness.    Cordially,    Dr AMBER Gamez/Yamileth  [facility]

## 2017-03-21 ENCOUNTER — TELEPHONE (OUTPATIENT)
Dept: FAMILY MEDICINE | Facility: OTHER | Age: 52
End: 2017-03-21

## 2017-03-22 ENCOUNTER — TELEPHONE (OUTPATIENT)
Dept: FAMILY MEDICINE | Facility: OTHER | Age: 52
End: 2017-03-22

## 2017-03-22 NOTE — TELEPHONE ENCOUNTER
Sorry we do not have any appt's available today. We can see her at 10:45 am tomorrow, arriving at 10:30 am, or she could go to  today if needed.

## 2017-03-22 NOTE — TELEPHONE ENCOUNTER
2:54 PM    Reason for Call: OVERBOOK    Patient is having the following symptoms: still not feeing well for 2 weeks.    The patient is requesting an appointment for Now  with Dr Gamez.    Was an appointment offered for this call? No    Preferred method for responding to this message: Telephone Call    If we cannot reach you directly, may we leave a detailed response at the number you provided? No    Can this message wait until your PCP/provider returns, if unavailable today?     Jonna Velazco

## 2017-03-22 NOTE — TELEPHONE ENCOUNTER
Called LM about available appt tomorrow Thursday 3/23/17 10:45am arrive 10:30am Dr Franco Culver

## 2017-03-28 LAB
LOCATION PERFORMED: NORMAL
RESULT: NORMAL
SEND OUTS MISC TEST CODE: NORMAL
SEND OUTS MISC TEST SPECIMEN: NORMAL
TEST NAME: NORMAL

## 2017-07-06 DIAGNOSIS — I10 BENIGN ESSENTIAL HYPERTENSION: ICD-10-CM

## 2017-07-07 ENCOUNTER — OFFICE VISIT (OUTPATIENT)
Dept: FAMILY MEDICINE | Facility: OTHER | Age: 52
End: 2017-07-07
Attending: PHYSICIAN ASSISTANT
Payer: COMMERCIAL

## 2017-07-07 VITALS
HEART RATE: 64 BPM | BODY MASS INDEX: 30.49 KG/M2 | HEIGHT: 65 IN | SYSTOLIC BLOOD PRESSURE: 106 MMHG | WEIGHT: 183 LBS | OXYGEN SATURATION: 98 % | DIASTOLIC BLOOD PRESSURE: 64 MMHG | TEMPERATURE: 97.3 F | RESPIRATION RATE: 16 BRPM

## 2017-07-07 DIAGNOSIS — E66.01 MORBID OBESITY DUE TO EXCESS CALORIES (H): ICD-10-CM

## 2017-07-07 DIAGNOSIS — I10 BENIGN ESSENTIAL HYPERTENSION: ICD-10-CM

## 2017-07-07 PROCEDURE — 99213 OFFICE O/P EST LOW 20 MIN: CPT | Performed by: PHYSICIAN ASSISTANT

## 2017-07-07 RX ORDER — HYDROCHLOROTHIAZIDE 25 MG/1
25 TABLET ORAL DAILY
Qty: 90 TABLET | Refills: 3 | Status: SHIPPED | OUTPATIENT
Start: 2017-07-07 | End: 2018-01-30 | Stop reason: ALTCHOICE

## 2017-07-07 RX ORDER — HYDROCHLOROTHIAZIDE 25 MG/1
TABLET ORAL
Qty: 30 TABLET | Refills: 0 | Status: SHIPPED | OUTPATIENT
Start: 2017-07-07 | End: 2017-07-07

## 2017-07-07 RX ORDER — PHENTERMINE HYDROCHLORIDE 30 MG/1
30 CAPSULE ORAL EVERY MORNING
Qty: 30 CAPSULE | Refills: 5 | Status: SHIPPED | OUTPATIENT
Start: 2017-07-07 | End: 2018-01-31

## 2017-07-07 RX ORDER — METOPROLOL SUCCINATE 50 MG/1
TABLET, EXTENDED RELEASE ORAL
Qty: 90 TABLET | Refills: 2 | Status: SHIPPED | OUTPATIENT
Start: 2017-07-07 | End: 2017-07-07

## 2017-07-07 RX ORDER — METOPROLOL SUCCINATE 50 MG/1
50 TABLET, EXTENDED RELEASE ORAL DAILY
Qty: 90 TABLET | Refills: 3 | Status: SHIPPED | OUTPATIENT
Start: 2017-07-07 | End: 2018-01-30

## 2017-07-07 ASSESSMENT — PAIN SCALES - GENERAL: PAINLEVEL: NO PAIN (0)

## 2017-07-07 ASSESSMENT — ANXIETY QUESTIONNAIRES
1. FEELING NERVOUS, ANXIOUS, OR ON EDGE: NOT AT ALL
7. FEELING AFRAID AS IF SOMETHING AWFUL MIGHT HAPPEN: NOT AT ALL
2. NOT BEING ABLE TO STOP OR CONTROL WORRYING: NOT AT ALL
IF YOU CHECKED OFF ANY PROBLEMS ON THIS QUESTIONNAIRE, HOW DIFFICULT HAVE THESE PROBLEMS MADE IT FOR YOU TO DO YOUR WORK, TAKE CARE OF THINGS AT HOME, OR GET ALONG WITH OTHER PEOPLE: NOT DIFFICULT AT ALL
3. WORRYING TOO MUCH ABOUT DIFFERENT THINGS: NOT AT ALL
GAD7 TOTAL SCORE: 0
5. BEING SO RESTLESS THAT IT IS HARD TO SIT STILL: NOT AT ALL
6. BECOMING EASILY ANNOYED OR IRRITABLE: NOT AT ALL

## 2017-07-07 ASSESSMENT — PATIENT HEALTH QUESTIONNAIRE - PHQ9: 5. POOR APPETITE OR OVEREATING: NOT AT ALL

## 2017-07-07 NOTE — NURSING NOTE
"Chief Complaint   Patient presents with     Hypertension     Pt is in for a FU on HTN meds.       Initial /64 (BP Location: Right arm, Patient Position: Chair, Cuff Size: Adult Regular)  Pulse 64  Temp 97.3  F (36.3  C) (Tympanic)  Resp 16  Ht 5' 5\" (1.651 m)  Wt 183 lb (83 kg)  SpO2 98%  BMI 30.45 kg/m2 Estimated body mass index is 30.45 kg/(m^2) as calculated from the following:    Height as of this encounter: 5' 5\" (1.651 m).    Weight as of this encounter: 183 lb (83 kg).  Medication Reconciliation: complete   Jonna Evangelista    "

## 2017-07-07 NOTE — PROGRESS NOTES
Subjective:  Nadeen Robles is a 51 year old female who presents for follow up of hypertension. Patient has no other concerns. Denies chest pain, palpitations, SOB or ankle edema.   Lab Results   Component Value Date    CHOL 199 02/23/2015     Lab Results   Component Value Date    HDL 53 02/23/2015     Lab Results   Component Value Date     02/23/2015     Lab Results   Component Value Date    TRIG 170 02/23/2015     Lab Results   Component Value Date    CHOLHDLRATIO 3.8 02/23/2015     Last Basic Metabolic Panel:  Lab Results   Component Value Date     10/02/2015      Lab Results   Component Value Date    POTASSIUM 4.2 10/02/2015     Lab Results   Component Value Date    CHLORIDE 108 10/02/2015     Lab Results   Component Value Date    JESSICA 8.3 10/02/2015     Lab Results   Component Value Date    CO2 24 10/02/2015     Lab Results   Component Value Date    BUN 19 10/02/2015     Lab Results   Component Value Date    CR 0.74 10/02/2015     Lab Results   Component Value Date    GLC 98 10/02/2015       Active diagnoses this visit:     Benign essential hypertension  Morbid obesity due to excess calories (H)    PMH, PSH, FH reviewed and unchanged    Current Outpatient Prescriptions   Medication     hydrochlorothiazide (HYDRODIURIL) 25 MG tablet     metoprolol (TOPROL-XL) 50 MG 24 hr tablet     phentermine 30 MG capsule     No current facility-administered medications for this visit.        Allergies   Allergen Reactions     Nkda [No Known Drug Allergies]        Review of Systems:  Gen: recent illness, change in weight  Derm: denies rash, moles or lesions  Resp: denies significant cough, SOB or wheeze  CV: denies chest pain, palpitations or peripheral edema  Psych: denies depressed mood or anxiety    Objective:    B/P: 106/64, T: 97.3, P: 64, R: 16  Body mass index is 30.45 kg/(m^2).    Physical Exam:  Constitutional: healthy, alert and no distress  CV: RRR. No murmur. No peripheral edema  Pulm: Lungs clear  to auscultation without wheeze, rales or rhonchi.   Skin: no suspicious lesions or rashes  Psych: Alert, orientated      Assessment/Plan  (I10) Benign essential hypertension  Comment: stable  Plan: hydrochlorothiazide (HYDRODIURIL) 25 MG tablet,        metoprolol (TOPROL-XL) 50 MG 24 hr tablet            (E66.01) Morbid obesity due to excess calories (H)  Comment: refill  Plan: phentermine 30 MG capsule                    Rx per EPIC.  Risk/benefit/side effects and intended purposes discussed.   Follow up in 6 months. Sooner prn.    Sunitha Lindsey

## 2017-07-07 NOTE — TELEPHONE ENCOUNTER
I called to notify the patient that one month of her medication was called in and she needs a follow up visit for further refills.  She said she has an appointment today.    Venus Mckenna LPN

## 2017-07-07 NOTE — MR AVS SNAPSHOT
"              After Visit Summary   2017    Nadeen Robles    MRN: [id number]           Patient Information     Date Of Birth          1965        Visit Information        Provider Department      2017 3:30 PM Sunitha Lindsey PA [facility]        Today's Diagnoses     Benign essential hypertension        Morbid obesity due to excess calories (H)           Follow-ups after your visit        Who to contact     If you have questions or need follow up information about today's clinic visit or your schedule please contact [facility] directly at [phone].  Normal or non-critical lab and imaging results will be communicated to you by [employer], letter or phone within 4 business days after the clinic has received the results. If you do not hear from us within 7 days, please contact the clinic through [employer] or phone. If you have a critical or abnormal lab result, we will notify you by phone as soon as possible.  Submit refill requests through [employer] or call your pharmacy and they will forward the refill request to us. Please allow 3 business days for your refill to be completed.          Additional Information About Your Visit        MyChart Information     [employer] lets you send messages to your doctor, view your test results, renew your prescriptions, schedule appointments and more. To sign up, go to [email or web address] . Click on \"Log in\" on the left side of the screen, which will take you to the Welcome page. Then click on \"Sign up Now\" on the right side of the page.     You will be asked to enter the access code listed below, as well as some personal information. Please follow the directions to create your username and password.     Your access code is: [email or web address]  Expires: 10/5/2017  3:25 PM     Your access code will  in 90 days. If you need help or a new code, please call your [facility] or [phone].        Care EveryWhere ID     This is your " "Care EveryWhere ID. This could be used by other organizations to access your [place] medical records  [id number]        Your Vitals Were     Pulse Temperature Respirations Height Pulse Oximetry BMI (Body Mass Index)    64 97.3  F (36.3  C) (Tympanic) 16 5' 5\" (1.651 m) 98% 30.45 kg/m2       Blood Pressure from Last 3 Encounters:   07/07/17 106/64   03/16/17 124/70   03/14/17 124/80    Weight from Last 3 Encounters:   07/07/17 183 lb (83 kg)   03/16/17 200 lb (90.7 kg)   03/14/17 200 lb (90.7 kg)              Today, you had the following     No orders found for display         Today's Medication Changes          These changes are accurate as of: 7/7/17  3:55 PM.  If you have any questions, ask your nurse or doctor.               These medicines have changed or have updated prescriptions.        Dose/Directions    hydrochlorothiazide 25 MG tablet   Commonly known as:  HYDRODIURIL   This may have changed:  See the new instructions.   Used for:  Benign essential hypertension   Changed by:  Sunitha Lindsey PA        Dose:  25 mg   Take 1 tablet (25 mg) by mouth daily   Quantity:  90 tablet   Refills:  3       metoprolol 50 MG 24 hr tablet   Commonly known as:  TOPROL-XL   This may have changed:  See the new instructions.   Used for:  Benign essential hypertension   Changed by:  Sunitha Lindsey PA        Dose:  50 mg   Take 1 tablet (50 mg) by mouth daily   Quantity:  90 tablet   Refills:  3            Where to get your medicines      These medications were sent to [id number] - [place] - [address] [place] [address]     Phone:  [phone]     hydrochlorothiazide 25 MG tablet    metoprolol 50 MG 24 hr tablet         Some of these will need a paper prescription and others can be bought over the counter.  Ask your nurse if you have questions.     Bring a paper prescription for each of these medications     phentermine 30 MG capsule                Primary Care Provider " Office Phone # Fax #    Sunitha GALINDO RENETTA Lindsey [phone]       [facility] [place] [address]  [place] [address]        Equal Access to Services     DIANA BOYCE : Hadii stephanie ku hadasho Soomaali, waaxda luqadaha, qaybta kaalmada adeegyada, luisa tinocon radha dutta laChandrakantmaureen yee. So [facility] [phone].    ATENCIÓN: Si habla español, tiene a johnson disposición servicios gratuitos de asistencia lingüística. Llame al [phone].    We comply with applicable federal civil rights laws and [place] laws. We do not discriminate on the basis of race, color, national origin, age, disability sex, sexual orientation or gender identity.            Thank you!     Thank you for choosing [facility]  for your care. Our goal is always to provide you with excellent care. Hearing back from our patients is one way we can continue to improve our services. Please take a few minutes to complete the written survey that you may receive in the mail after your visit with us. Thank you!             Your Updated Medication List - Protect others around you: Learn how to safely use, store and throw away your medicines at www.disposemymeds.org.          This list is accurate as of: 7/7/17  3:55 PM.  Always use your most recent med list.                   Brand Name Dispense Instructions for use Diagnosis    hydrochlorothiazide 25 MG tablet    HYDRODIURIL    90 tablet    Take 1 tablet (25 mg) by mouth daily    Benign essential hypertension       metoprolol 50 MG 24 hr tablet    TOPROL-XL    90 tablet    Take 1 tablet (50 mg) by mouth daily    Benign essential hypertension       phentermine 30 MG capsule     30 capsule    Take 1 capsule (30 mg) by mouth every morning    Morbid obesity due to excess calories (H)

## 2017-07-07 NOTE — TELEPHONE ENCOUNTER
Hydrodiuril  Last office visit: 3/16/17  Last refill: 1/6/17 #90, 0 R.  Last BMP: 10/2/15.  Medication pended.  Please advise.  Thank you.

## 2017-07-08 ASSESSMENT — PATIENT HEALTH QUESTIONNAIRE - PHQ9: SUM OF ALL RESPONSES TO PHQ QUESTIONS 1-9: 0

## 2017-07-08 ASSESSMENT — ANXIETY QUESTIONNAIRES: GAD7 TOTAL SCORE: 0

## 2017-07-18 ENCOUNTER — OFFICE VISIT (OUTPATIENT)
Dept: FAMILY MEDICINE | Facility: OTHER | Age: 52
End: 2017-07-18
Attending: PHYSICIAN ASSISTANT
Payer: COMMERCIAL

## 2017-07-18 VITALS
DIASTOLIC BLOOD PRESSURE: 76 MMHG | HEIGHT: 65 IN | WEIGHT: 189 LBS | BODY MASS INDEX: 31.49 KG/M2 | SYSTOLIC BLOOD PRESSURE: 118 MMHG | TEMPERATURE: 97.7 F | OXYGEN SATURATION: 99 % | RESPIRATION RATE: 16 BRPM | HEART RATE: 70 BPM

## 2017-07-18 DIAGNOSIS — I10 BENIGN ESSENTIAL HYPERTENSION: Primary | ICD-10-CM

## 2017-07-18 DIAGNOSIS — Z12.31 ENCOUNTER FOR SCREENING MAMMOGRAM FOR BREAST CANCER: ICD-10-CM

## 2017-07-18 DIAGNOSIS — Z01.419 WELL WOMAN EXAM: ICD-10-CM

## 2017-07-18 DIAGNOSIS — B96.89 BACTERIAL VAGINOSIS: ICD-10-CM

## 2017-07-18 DIAGNOSIS — Z23 NEED FOR VACCINATION: ICD-10-CM

## 2017-07-18 DIAGNOSIS — Z12.4 SCREENING FOR CERVICAL CANCER: ICD-10-CM

## 2017-07-18 DIAGNOSIS — N76.0 BACTERIAL VAGINOSIS: ICD-10-CM

## 2017-07-18 DIAGNOSIS — R87.610 PAPANICOLAOU SMEAR OF CERVIX WITH ATYPICAL SQUAMOUS CELLS OF UNDETERMINED SIGNIFICANCE (ASC-US): ICD-10-CM

## 2017-07-18 DIAGNOSIS — Z12.11 SPECIAL SCREENING FOR MALIGNANT NEOPLASMS, COLON: ICD-10-CM

## 2017-07-18 DIAGNOSIS — N89.8 VAGINAL DISCHARGE: ICD-10-CM

## 2017-07-18 LAB
ANION GAP SERPL CALCULATED.3IONS-SCNC: 8 MMOL/L (ref 3–14)
BUN SERPL-MCNC: 19 MG/DL (ref 7–30)
CALCIUM SERPL-MCNC: 9.4 MG/DL (ref 8.5–10.1)
CHLORIDE SERPL-SCNC: 105 MMOL/L (ref 94–109)
CHOLEST SERPL-MCNC: 210 MG/DL
CO2 SERPL-SCNC: 26 MMOL/L (ref 20–32)
CREAT SERPL-MCNC: 0.72 MG/DL (ref 0.52–1.04)
ERYTHROCYTE [DISTWIDTH] IN BLOOD BY AUTOMATED COUNT: 13.4 % (ref 10–15)
GFR SERPL CREATININE-BSD FRML MDRD: 86 ML/MIN/1.7M2
GLUCOSE SERPL-MCNC: 96 MG/DL (ref 70–99)
HCT VFR BLD AUTO: 41.1 % (ref 35–47)
HDLC SERPL-MCNC: 61 MG/DL
HGB BLD-MCNC: 14.1 G/DL (ref 11.7–15.7)
LDLC SERPL CALC-MCNC: 120 MG/DL
MCH RBC QN AUTO: 29.4 PG (ref 26.5–33)
MCHC RBC AUTO-ENTMCNC: 34.3 G/DL (ref 31.5–36.5)
MCV RBC AUTO: 86 FL (ref 78–100)
MICRO REPORT STATUS: ABNORMAL
NONHDLC SERPL-MCNC: 149 MG/DL
PLATELET # BLD AUTO: 290 10E9/L (ref 150–450)
POTASSIUM SERPL-SCNC: 3.9 MMOL/L (ref 3.4–5.3)
RBC # BLD AUTO: 4.79 10E12/L (ref 3.8–5.2)
SODIUM SERPL-SCNC: 139 MMOL/L (ref 133–144)
SPECIMEN SOURCE: ABNORMAL
TRIGL SERPL-MCNC: 146 MG/DL
WBC # BLD AUTO: 7.3 10E9/L (ref 4–11)
WET PREP SPEC: ABNORMAL

## 2017-07-18 PROCEDURE — 90715 TDAP VACCINE 7 YRS/> IM: CPT | Performed by: PHYSICIAN ASSISTANT

## 2017-07-18 PROCEDURE — 99396 PREV VISIT EST AGE 40-64: CPT | Mod: 25 | Performed by: PHYSICIAN ASSISTANT

## 2017-07-18 PROCEDURE — 36415 COLL VENOUS BLD VENIPUNCTURE: CPT | Performed by: PHYSICIAN ASSISTANT

## 2017-07-18 PROCEDURE — 99000 SPECIMEN HANDLING OFFICE-LAB: CPT | Performed by: PHYSICIAN ASSISTANT

## 2017-07-18 PROCEDURE — 87624 HPV HI-RISK TYP POOLED RSLT: CPT | Mod: 90 | Performed by: PHYSICIAN ASSISTANT

## 2017-07-18 PROCEDURE — 88142 CYTOPATH C/V THIN LAYER: CPT | Performed by: PHYSICIAN ASSISTANT

## 2017-07-18 PROCEDURE — 80061 LIPID PANEL: CPT | Performed by: PHYSICIAN ASSISTANT

## 2017-07-18 PROCEDURE — 80048 BASIC METABOLIC PNL TOTAL CA: CPT | Performed by: PHYSICIAN ASSISTANT

## 2017-07-18 PROCEDURE — 85027 COMPLETE CBC AUTOMATED: CPT | Performed by: PHYSICIAN ASSISTANT

## 2017-07-18 PROCEDURE — 87210 SMEAR WET MOUNT SALINE/INK: CPT | Performed by: PHYSICIAN ASSISTANT

## 2017-07-18 PROCEDURE — 90471 IMMUNIZATION ADMIN: CPT | Performed by: PHYSICIAN ASSISTANT

## 2017-07-18 RX ORDER — CLINDAMYCIN PHOSPHATE 20 MG/G
1 CREAM VAGINAL AT BEDTIME
Qty: 40 G | Refills: 0 | Status: SHIPPED | OUTPATIENT
Start: 2017-07-18 | End: 2017-07-25

## 2017-07-18 ASSESSMENT — ANXIETY QUESTIONNAIRES
5. BEING SO RESTLESS THAT IT IS HARD TO SIT STILL: NOT AT ALL
3. WORRYING TOO MUCH ABOUT DIFFERENT THINGS: NOT AT ALL
6. BECOMING EASILY ANNOYED OR IRRITABLE: NOT AT ALL
GAD7 TOTAL SCORE: 0
1. FEELING NERVOUS, ANXIOUS, OR ON EDGE: NOT AT ALL
2. NOT BEING ABLE TO STOP OR CONTROL WORRYING: NOT AT ALL
7. FEELING AFRAID AS IF SOMETHING AWFUL MIGHT HAPPEN: NOT AT ALL

## 2017-07-18 ASSESSMENT — PATIENT HEALTH QUESTIONNAIRE - PHQ9: 5. POOR APPETITE OR OVEREATING: NOT AT ALL

## 2017-07-18 ASSESSMENT — PAIN SCALES - GENERAL: PAINLEVEL: NO PAIN (0)

## 2017-07-18 NOTE — PROGRESS NOTES
Subjective:  Nadeen Robles is a 51 year old female who presents for annual physical exam. Only concern is recent vaginal discharge and discomfort. Next due for 1st colonoscopy    Past Medical History:   Diagnosis Date     Autoimmune hepatitis (H)      HTN (hypertension)      Migraine      Obesity        Past Surgical History:   Procedure Laterality Date     cholecystectomy  1991     HC REMOVAL GALLBLADDER  1990       Family History   Problem Relation Age of Onset     DIABETES Mother      Other Cancer Mother 78     stage 4 lung cancer     DIABETES Father      CEREBROVASCULAR DISEASE Father 75     Cardiovascular Father 75     heart attack       Social History   Substance Use Topics     Smoking status: Never Smoker     Smokeless tobacco: Never Used      Comment: no 2nd hand smoke exposure     Alcohol use Yes      Comment: rarely       Current Outpatient Prescriptions   Medication     hydrochlorothiazide (HYDRODIURIL) 25 MG tablet     phentermine 30 MG capsule     metoprolol (TOPROL-XL) 50 MG 24 hr tablet     No current facility-administered medications for this visit.        Allergies   Allergen Reactions     Nkda [No Known Drug Allergies]        Review of Systems:  Gen: negative for fever, chills, change in weight  Derm: negative for worrisome rashes, moles or lesions  Eyes: negative for vision changes or irritation  ENT: negative for ear, mouth and throat problems  Resp: negative for significant cough or SOB  Breast: negative for masses, tenderness or nipple discharge  CV: negative for chest pain, palpitations or peripheral edema  GI: negative for nausea, abdominal pain, heartburn, or change in bowel habits  : negative for dysuria, hematuria or abnormal menstrual cycles  Musculoskeletal: negative for significant arthralgias or myalgia  Neuro: negative for weakness, dizziness or paresthesias  Endo: negative for temperature intolerance, skin/hair changes  Heme: negative for bleeding problems  Psych: negative for  changes in mood or affect    Objective:  B/P: 118/76, T: 97.7, P: 70, R: 16  189 lbs 0 oz Body mass index is 31.45 kg/(m^2).           Physical Exam:  Constitutional: healthy, alert and no distress  Head: Normocephalic. No masses, lesions, tenderness or abnormalities  ENT: ENT exam normal, no neck nodes or sinus tenderness  Breasts: symmetric, no dominant or suspicious mass, no skin or nipple changes, no axillary adenopathy palpable bilaterally  CV: RRR. No murmur. No pretibial edema  Pulm: Lungs clear to auscultation. No rhonchi, rales or wheeze  GI: Abdomen soft, non-tender. BS normal. No masses, organomegaly. No rebound or guarding  : normal external genitalia, cervix was visualized and normal, normal sized uterus and ovaries, no adnexal or cervical motion tenderness. Pap Smear. Wet prep  Musculoskeletal: extremities normal- no gross deformities noted, gait normal and normal muscle tone  Skin: no suspicious lesions or rashes  Neuroc: Gait normal. Reflexes normal and symmetric. Sensation grossly WNL.  Psych: mentation appears normal and affect normal/bright  Heme: normal ant/post cervical, axillary, supraclavicular and inguinal nodes        Assessment and Plan:    (I10) Benign essential hypertension  (primary encounter diagnosis)  Plan: Basic metabolic panel, Lipid Profile, CBC with         platelets            (Z12.31) Encounter for screening mammogram for breast cancer  Plan: MA SCREENING DIGITAL BILATERAL ([place])            (Z12.11) Special screening for malignant neoplasms, colon  Comment: refer to surgery  Plan: GENERAL SURG ADULT REFERRAL            (N89.8) Vaginal discharge  Plan: Wet prep            (Z12.4) Screening for cervical cancer  Plan: A pap thin layer screen with  HPV - recommended        age 30 - 65 years (select HPV order below), HPV        High Risk Types DNA Cervical            (Z23) Need for vaccination  Plan: TDAP VACCINE (ADACEL), ADMIN 1st VACCINE            (N76.0,  B96.89) Bacterial  vaginosis  Comment: BV  Plan: clindamycin (CLEOCIN) 2 % cream            (Z01.419) Well woman exam  Comment: Other than above, normal exam today  Plan: f/u with results      Sunitha Lindsey PA-C

## 2017-07-18 NOTE — MR AVS SNAPSHOT
After Visit Summary   7/18/2017    Nadeen Robles    MRN: [id number]           Patient Information     Date Of Birth          1965        Visit Information        Provider Department      7/18/2017 10:30 AM Sunitha Lindsey PA [facility]        Today's Diagnoses     Benign essential hypertension    -  1    Encounter for screening mammogram for breast cancer        Special screening for malignant neoplasms, colon        Vaginal discharge        Screening for cervical cancer        Need for vaccination        Bacterial vaginosis        Well woman exam           Follow-ups after your visit        Additional Services     GENERAL SURG ADULT REFERRAL       Your provider has referred you to: surgery    Please be aware that coverage of these services is subject to the terms and limitations of your health insurance plan.  Call member services at your health plan with any benefit or coverage questions.      Please bring the following with you to your appointment:    (1) Any X-Rays, CTs or MRIs which have been performed.  Contact the facility where they were done to arrange for  prior to your scheduled appointment.   (2) List of current medications   (3) This referral request   (4) Any documents/labs given to you for this referral                  Your next 10 appointments already scheduled     Jul 19, 2017  3:30 PM CDT   MAMMOGRAM with  MAMMOGRAM [facility]    [address] [facility] [id number] [phone]           Do not wear any body powder, lotions, deodorant or perfume the day of the exam. Bring a list of all medications, especially hormones.  If your last mammogram was not done at [facility], please bring your mammogram films. We will need the name of your MD/PA to send a copy of your report.              Who to contact     If you have questions or need follow up information about today's clinic visit or your  "schedule please contact [facility] directly at [phone].  Normal or non-critical lab and imaging results will be communicated to you by MyChart, letter or phone within 4 business days after the clinic has received the results. If you do not hear from us within 7 days, please contact the clinic through [employer] or phone. If you have a critical or abnormal lab result, we will notify you by phone as soon as possible.  Submit refill requests through [employer] or call your pharmacy and they will forward the refill request to us. Please allow 3 business days for your refill to be completed.          Additional Information About Your Visit        [employer] Information     [employer] lets you send messages to your doctor, view your test results, renew your prescriptions, schedule appointments and more. To sign up, go to [email or web address] . Click on \"Log in\" on the left side of the screen, which will take you to the Welcome page. Then click on \"Sign up Now\" on the right side of the page.     You will be asked to enter the access code listed below, as well as some personal information. Please follow the directions to create your username and password.     Your access code is: [email or web address]  Expires: 10/5/2017  3:25 PM     Your access code will  in 90 days. If you need help or a new code, please call your [place] clinic or [phone].        Care EveryWhere ID     This is your Care EveryWhere ID. This could be used by other organizations to access your [place] medical records  [id number]        Your Vitals Were     Pulse Temperature Respirations Height Last Period Pulse Oximetry    70 97.7  F (36.5  C) (Tympanic) 16 5' 5\" (1.651 m) 2017 99%    BMI (Body Mass Index)                   31.45 kg/m2            Blood Pressure from Last 3 Encounters:   17 118/76   17 106/64   17 124/70    Weight from Last 3 Encounters:   17 189 lb (85.7 kg)   17 183 lb (83 kg)   17 " 200 lb (90.7 kg)              We Performed the Following     A pap thin layer screen with  HPV - recommended age 30 - 65 years (select HPV order below)     ADMIN 1st VACCINE     Basic metabolic panel     CBC with platelets     GENERAL SURG ADULT REFERRAL     HPV High Risk Types DNA Cervical     Lipid Profile     TDAP VACCINE (ADACEL)     Wet prep          Today's Medication Changes          These changes are accurate as of: 7/18/17  5:06 PM.  If you have any questions, ask your nurse or doctor.               Start taking these medicines.        Dose/Directions    clindamycin 2 % cream   Commonly known as:  CLEOCIN   Used for:  Bacterial vaginosis   Started by:  Sunitha Lindsey PA        Dose:  1 applicator   Place 1 applicator vaginally At Bedtime for 7 days   Quantity:  40 g   Refills:  0            Where to get your medicines      These medications were sent to [id number] - [place], [place] - [address]  [address], [place] [address]     Phone:  [phone]     clindamycin 2 % cream                Primary Care Provider Office Phone # Fax #    RENETTA Stallworth [phone]       [facility] [place] [address]  [place] [address]        Equal Access to Services     [facility] AH: Hadii aad ku hadasho Soomaali, waaxda luqadaha, qaybta kaalmada adeegyada, waxay idiin hayaan adearmando landryaramohinder collins . So [facility] [phone].    ATENCIÓN: Si habla español, tiene a johnson disposición servicios gratuitos de asistencia lingüística. Llame al [phone].    We comply with applicable federal civil rights laws and [place] laws. We do not discriminate on the basis of race, color, national origin, age, disability sex, sexual orientation or gender identity.            Thank you!     Thank you for choosing [facility]  for your care. Our goal is always to provide you with excellent care. Hearing back from our patients is one way we can continue to improve our services.  Please take a few minutes to complete the written survey that you may receive in the mail after your visit with us. Thank you!             Your Updated Medication List - Protect others around you: Learn how to safely use, store and throw away your medicines at www.disposemymeds.org.          This list is accurate as of: 7/18/17  5:06 PM.  Always use your most recent med list.                   Brand Name Dispense Instructions for use Diagnosis    clindamycin 2 % cream    CLEOCIN    40 g    Place 1 applicator vaginally At Bedtime for 7 days    Bacterial vaginosis       hydrochlorothiazide 25 MG tablet    HYDRODIURIL    90 tablet    Take 1 tablet (25 mg) by mouth daily    Benign essential hypertension       metoprolol 50 MG 24 hr tablet    TOPROL-XL    90 tablet    Take 1 tablet (50 mg) by mouth daily    Benign essential hypertension       phentermine 30 MG capsule     30 capsule    Take 1 capsule (30 mg) by mouth every morning    Morbid obesity due to excess calories (H)

## 2017-07-18 NOTE — NURSING NOTE
"Chief Complaint   Patient presents with     Physical     fasting for labs       Initial /76 (BP Location: Right arm, Patient Position: Chair, Cuff Size: Adult Large)  Pulse 70  Temp 97.7  F (36.5  C) (Tympanic)  Resp 16  Ht 5' 5\" (1.651 m)  Wt 189 lb (85.7 kg)  LMP 06/25/2017  SpO2 99%  BMI 31.45 kg/m2 Estimated body mass index is 31.45 kg/(m^2) as calculated from the following:    Height as of this encounter: 5' 5\" (1.651 m).    Weight as of this encounter: 189 lb (85.7 kg).  Medication Reconciliation: complete   Venus Mckenna LPN      "

## 2017-07-18 NOTE — LETTER
[place] [address]      July 25, 2017        Nadeen Robles  [id number]   [facility] [address]      Dear Nadeen Robles    Thank you for allowing me to participate in your care. Your recent test results were reviewed and listed below.      Your results are provided below for your review  Results for orders placed or performed in visit on 07/18/17   Basic metabolic panel   Result Value Ref Range    Sodium 139 133 - 144 mmol/L    Potassium 3.9 3.4 - 5.3 mmol/L    Chloride 105 94 - 109 mmol/L    Carbon Dioxide 26 20 - 32 mmol/L    Anion Gap 8 3 - 14 mmol/L    Glucose 96 70 - 99 mg/dL    Urea Nitrogen 19 7 - 30 mg/dL    Creatinine 0.72 0.52 - 1.04 mg/dL    GFR Estimate 86 >60 mL/min/1.7m2    GFR Estimate If Black >90   GFR Calc   >60 mL/min/1.7m2    Calcium 9.4 8.5 - 10.1 mg/dL   Lipid Profile   Result Value Ref Range    Cholesterol 210 (H) <200 mg/dL    Triglycerides 146 <150 mg/dL    HDL Cholesterol 61 >49 mg/dL    LDL Cholesterol Calculated 120 (H) <100 mg/dL    Non HDL Cholesterol 149 (H) <130 mg/dL   CBC with platelets   Result Value Ref Range    WBC 7.3 4.0 - 11.0 10e9/L    RBC Count 4.79 3.8 - 5.2 10e12/L    Hemoglobin 14.1 11.7 - 15.7 g/dL    Hematocrit 41.1 35.0 - 47.0 %    MCV 86 78 - 100 fl    MCH 29.4 26.5 - 33.0 pg    MCHC 34.3 31.5 - 36.5 g/dL    RDW 13.4 10.0 - 15.0 %    Platelet Count 290 150 - 450 10e9/L   A pap thin layer screen with  HPV - recommended age 30 - 65 years (select HPV order below)   Result Value Ref Range    PAP ASC-US (A)     Copath Report         Patient Name: NADEEN ROBLES  MR#: [id number]  Specimen #: [id number]  Collected: 7/18/2017  Received: 7/19/2017  Reported: 7/24/2017 15:06  Ordering Phy(s): SRIKANTH ALVARADO    For improved result formatting, select 'View Enhanced Report Format'  under Linked Documents section.    SPECIMEN/STAIN PROCESS:  Pap thin layer prep screening  ([facility])       Pap-Cyto x 1, HPV ordered x 1    SOURCE: Cervical, endocervical  ----------------------------------------------------------------   Pap thin layer prep screening ([facility])  SPECIMEN ADEQUACY:  Satisfactory for evaluation.  -Transformation zone component absent.    CYTOLOGIC INTERPRETATION:    Epithelial cell abnormality:  squamous cell:  atypical squamous cells-of  undetermined significance (ASC-US).         Other Non-Neoplastic Findings:  Reactive cellular changes.    Electronically signed out by:    Dewey Lal M.D.    Processed and screened at [facility],  [facility], later [facility]    CLINICAL  HISTORY:  LMP: 6/25/17  Previous normal pap: 10/11,    Papanicolaou Test Limitations:  Cervical cytology is a screening test  with limited sensitivity; regular screening is critical for cancer  prevention; Pap tests are primarily effective for the  diagnosis/prevention of squamous cell carcinoma, not adenocarcinomas or  other cancers.    TESTING LAB LOCATION:  [address] [address]  [phone]    COLLECTION SITE:  Client:  [facility]  Location: [facility] (B)     Wet prep   Result Value Ref Range    Specimen Description Vagina     Wet Prep (A)      No Trichomonas seen No yeast seen  Many Clue cells seen CORRECTED ON 07/18 AT 1701: PREVIOUSLY REPORTED AS Many No   clue cells seen  Many WBC'S seen      Micro Report Status FINAL 07/18/2017           Thank you for choosing [facility]. As a result, please continue with the treatment plan discussed in the office. If you have any further questions or concerns, please do not hesitate to contact us.      Sincerely,        Sunitha Lindsey PA-C  [address] [address]  Phone: [phone]

## 2017-07-18 NOTE — LETTER
[address] [place]  [address]      July 18, 2017        Nadeen Robles  [id number]   [facility] [address]      Dear Nadeen Robles    Thank you for allowing me to participate in your care. Your recent test results were reviewed and listed below.      Your results are provided below for your review  Results for orders placed or performed in visit on 07/18/17   Basic metabolic panel   Result Value Ref Range    Sodium 139 133 - 144 mmol/L    Potassium 3.9 3.4 - 5.3 mmol/L    Chloride 105 94 - 109 mmol/L    Carbon Dioxide 26 20 - 32 mmol/L    Anion Gap 8 3 - 14 mmol/L    Glucose 96 70 - 99 mg/dL    Urea Nitrogen 19 7 - 30 mg/dL    Creatinine 0.72 0.52 - 1.04 mg/dL    GFR Estimate 86 >60 mL/min/1.7m2    GFR Estimate If Black >90   GFR Calc   >60 mL/min/1.7m2    Calcium 9.4 8.5 - 10.1 mg/dL   Lipid Profile   Result Value Ref Range    Cholesterol 210 (H) <200 mg/dL    Triglycerides 146 <150 mg/dL    HDL Cholesterol 61 >49 mg/dL    LDL Cholesterol Calculated 120 (H) <100 mg/dL    Non HDL Cholesterol 149 (H) <130 mg/dL   CBC with platelets   Result Value Ref Range    WBC 7.3 4.0 - 11.0 10e9/L    RBC Count 4.79 3.8 - 5.2 10e12/L    Hemoglobin 14.1 11.7 - 15.7 g/dL    Hematocrit 41.1 35.0 - 47.0 %    MCV 86 78 - 100 fl    MCH 29.4 26.5 - 33.0 pg    MCHC 34.3 31.5 - 36.5 g/dL    RDW 13.4 10.0 - 15.0 %    Platelet Count 290 150 - 450 10e9/L   Wet prep   Result Value Ref Range    Specimen Description Vagina     Wet Prep (A)      No Trichomonas seen No yeast seen  Many Clue cells seen CORRECTED ON 07/18 AT 1701: PREVIOUSLY REPORTED AS Many No   clue cells seen  Many WBC'S seen      Micro Report Status FINAL 07/18/2017       These are the rest of your labs. All normal.    Thank you for choosing [facility]. As a result, please continue with the treatment plan discussed in the office. If you have any further questions or concerns, please do not  hesitate to contact us.      Sincerely,        Sunitha Lindsey PA-C  [address] [place] [facility] [address]  Phone: [phone]

## 2017-07-18 NOTE — NURSING NOTE
Cerumenosis is noted.  Wax is removed by syringing and manual debridement from both ears with excellent results from her Right ear and poor results from her Left ear.  Patient tolerated well and she will use ear wax softening drops per Sunitha in her Left ear and return again in about a week to try another ear wash.  Venus Mckenna LPN

## 2017-07-19 DIAGNOSIS — Z12.31 VISIT FOR SCREENING MAMMOGRAM: Primary | ICD-10-CM

## 2017-07-19 PROCEDURE — 77063 BREAST TOMOSYNTHESIS BI: CPT | Mod: TC | Performed by: RADIOLOGY

## 2017-07-19 PROCEDURE — G0202 SCR MAMMO BI INCL CAD: HCPCS | Mod: TC | Performed by: RADIOLOGY

## 2017-07-19 ASSESSMENT — PATIENT HEALTH QUESTIONNAIRE - PHQ9: SUM OF ALL RESPONSES TO PHQ QUESTIONS 1-9: 0

## 2017-07-19 ASSESSMENT — ANXIETY QUESTIONNAIRES: GAD7 TOTAL SCORE: 0

## 2017-07-24 LAB
COPATH REPORT: ABNORMAL
PAP: ABNORMAL

## 2017-07-25 LAB
FINAL DIAGNOSIS: NORMAL
HPV HR 12 DNA CVX QL NAA+PROBE: NEGATIVE
HPV16 DNA SPEC QL NAA+PROBE: NEGATIVE
HPV18 DNA SPEC QL NAA+PROBE: NEGATIVE
SPECIMEN DESCRIPTION: NORMAL

## 2017-08-09 ENCOUNTER — OFFICE VISIT (OUTPATIENT)
Dept: OBGYN | Facility: OTHER | Age: 52
End: 2017-08-09
Attending: OBSTETRICS & GYNECOLOGY
Payer: COMMERCIAL

## 2017-08-09 VITALS
WEIGHT: 181 LBS | SYSTOLIC BLOOD PRESSURE: 122 MMHG | BODY MASS INDEX: 30.16 KG/M2 | HEIGHT: 65 IN | DIASTOLIC BLOOD PRESSURE: 70 MMHG | HEART RATE: 60 BPM

## 2017-08-09 DIAGNOSIS — Z20.2 POSSIBLE EXPOSURE TO STD: ICD-10-CM

## 2017-08-09 DIAGNOSIS — N90.89 VULVAR IRRITATION: ICD-10-CM

## 2017-08-09 DIAGNOSIS — R87.610 PAPANICOLAOU SMEAR OF CERVIX WITH ATYPICAL SQUAMOUS CELLS OF UNDETERMINED SIGNIFICANCE (ASC-US): Primary | ICD-10-CM

## 2017-08-09 DIAGNOSIS — E66.9 NON MORBID OBESITY, UNSPECIFIED OBESITY TYPE: ICD-10-CM

## 2017-08-09 LAB
MICRO REPORT STATUS: NORMAL
SPECIMEN SOURCE: NORMAL
TSH SERPL DL<=0.005 MIU/L-ACNC: 1.62 MU/L (ref 0.4–4)
WET PREP SPEC: NORMAL

## 2017-08-09 PROCEDURE — 87340 HEPATITIS B SURFACE AG IA: CPT | Mod: 90 | Performed by: OBSTETRICS & GYNECOLOGY

## 2017-08-09 PROCEDURE — 88142 CYTOPATH C/V THIN LAYER: CPT | Performed by: OBSTETRICS & GYNECOLOGY

## 2017-08-09 PROCEDURE — 86803 HEPATITIS C AB TEST: CPT | Mod: 90 | Performed by: OBSTETRICS & GYNECOLOGY

## 2017-08-09 PROCEDURE — 87591 N.GONORRHOEAE DNA AMP PROB: CPT | Mod: 90 | Performed by: OBSTETRICS & GYNECOLOGY

## 2017-08-09 PROCEDURE — 87491 CHLMYD TRACH DNA AMP PROBE: CPT | Mod: 90 | Performed by: OBSTETRICS & GYNECOLOGY

## 2017-08-09 PROCEDURE — 87389 HIV-1 AG W/HIV-1&-2 AB AG IA: CPT | Mod: 90 | Performed by: OBSTETRICS & GYNECOLOGY

## 2017-08-09 PROCEDURE — 87624 HPV HI-RISK TYP POOLED RSLT: CPT | Mod: 90 | Performed by: OBSTETRICS & GYNECOLOGY

## 2017-08-09 PROCEDURE — 87210 SMEAR WET MOUNT SALINE/INK: CPT | Performed by: OBSTETRICS & GYNECOLOGY

## 2017-08-09 PROCEDURE — 99000 SPECIMEN HANDLING OFFICE-LAB: CPT | Performed by: OBSTETRICS & GYNECOLOGY

## 2017-08-09 PROCEDURE — 86780 TREPONEMA PALLIDUM: CPT | Mod: 90 | Performed by: OBSTETRICS & GYNECOLOGY

## 2017-08-09 PROCEDURE — 36415 COLL VENOUS BLD VENIPUNCTURE: CPT | Performed by: OBSTETRICS & GYNECOLOGY

## 2017-08-09 PROCEDURE — 84443 ASSAY THYROID STIM HORMONE: CPT | Performed by: OBSTETRICS & GYNECOLOGY

## 2017-08-09 PROCEDURE — 99203 OFFICE O/P NEW LOW 30 MIN: CPT | Performed by: OBSTETRICS & GYNECOLOGY

## 2017-08-09 RX ORDER — NYSTATIN AND TRIAMCINOLONE ACETONIDE 100000; 1 [USP'U]/G; MG/G
CREAM TOPICAL 2 TIMES DAILY
Qty: 30 G | Refills: 3 | Status: SHIPPED | OUTPATIENT
Start: 2017-08-09 | End: 2018-01-30

## 2017-08-09 NOTE — PROGRESS NOTES
"Nadeen Robles is a 51 year old female with ascus pap and neg hr hpv and exposure. BV may be back. Treated with \"white stuff\". Vas. Reg periods. No PCB or IMB. Labs and pap reviewed      O:   /70  Pulse 60  Ht 5' 5\" (1.651 m)  Wt 181 lb (82.1 kg)  LMP 06/25/2017  BMI 30.12 kg/m2   aa  Vulva with erythema  Other wise normal pelvic.  Questionable discharge    A:  Exposure  Ascus with neg hr hpv    P:  rto 3 months repeat STD testing  HIV,Hep,trep,wet prep,gc,ct,tsh,pap with HR HPV with cytobrush done  rto 6 months discuss pap again and possible retest  Needs colonoscopy, waiting for call  Ok to leave message on cell  TWSO for mycologue cream   metrogel if pos BV again    Greater than 25 minutes were spent face to face counseling this patient    Rhoda Sky MD    "

## 2017-08-09 NOTE — PATIENT INSTRUCTIONS
Labs and pap today.  Return in 3 months for STD testing.  Return to clinic in 6 months to discuss ap and possible repeat pap testing.  Need colonoscopy. Call [phone] for appointment.

## 2017-08-09 NOTE — NURSING NOTE
"Chief Complaint   Patient presents with     Consult       Initial /70  Pulse 60  Ht 5' 5\" (1.651 m)  Wt 181 lb (82.1 kg)  LMP 06/25/2017  BMI 30.12 kg/m2 Estimated body mass index is 30.12 kg/(m^2) as calculated from the following:    Height as of this encounter: 5' 5\" (1.651 m).    Weight as of this encounter: 181 lb (82.1 kg).  Medication Reconciliation: nnamdi Cifuentes      "

## 2017-08-09 NOTE — MR AVS SNAPSHOT
After Visit Summary   8/9/2017    Nadeen Robles    MRN: [id number]           Patient Information     Date Of Birth          1965        Visit Information        Provider Department      8/9/2017 9:30 AM Rhoda Sky MD [facility]        Today's Diagnoses     Papanicolaou smear of cervix with atypical squamous cells of undetermined significance (ASC-US)    -  1    Possible exposure to STD        Non morbid obesity, unspecified obesity type        Vulvar irritation          Care Instructions    Labs and pap today.  Return in 3 months for STD testing.  Return to clinic in 6 months to discuss ap and possible repeat pap testing.  Need colonoscopy. Call [phone] for appointment.            Follow-ups after your visit        Who to contact     If you have questions or need follow up information about today's clinic visit or your schedule please contact [facility] directly at [phone].  Normal or non-critical lab and imaging results will be communicated to you by [employer], letter or phone within 4 business days after the clinic has received the results. If you do not hear from us within 7 days, please contact the clinic through [employer]ah[employer] or phone. If you have a critical or abnormal lab result, we will notify you by phone as soon as possible.  Submit refill requests through [employer] or call your pharmacy and they will forward the refill request to us. Please allow 3 business days for your refill to be completed.          Additional Information About Your Visit        MyChart Information     [employer] gives you secure access to your electronic health record. If you see a primary care provider, you can also send messages to your care team and make appointments. If you have questions, please call your primary care clinic.  If you do not have a primary care provider, please call [phone] and they will assist you.        Care EveryWhere ID     This is your Care  "EveryWhere ID. This could be used by other organizations to access your [place] medical records  [id number]        Your Vitals Were     Pulse Height Last Period BMI (Body Mass Index)          60 5' 5\" (1.651 m) 06/25/2017 30.12 kg/m2         Blood Pressure from Last 3 Encounters:   08/09/17 122/70   07/18/17 118/76   07/07/17 106/64    Weight from Last 3 Encounters:   08/09/17 181 lb (82.1 kg)   07/18/17 189 lb (85.7 kg)   07/07/17 183 lb (83 kg)              We Performed the Following     A pap thin layer screen with  HPV - recommended age 30 - 65 years (select HPV order below)     Anti Treponema     CHLAMYDIA TRACHOMATIS PCR     Hepatitis B surface antigen     Hepatitis C antibody     HIV Antigen Antibody Combo     HPV High Risk Types DNA Cervical     NEISSERIA GONORRHOEA PCR     TSH     Wet prep        Primary Care Provider Office Phone # Fax #    RENETTA Stallworth [phone]       [facility] [place] [address]  [place] [address]        Equal Access to Services     [facility]: Hadii aad ku hadasho Soomaali, waaxda luqadaha, qaybta kaalmada adeegyada, waxpenelope collins . So [facility] [phone].    ATENCIÓN: Si habla español, tiene a johnson disposición servicios gratuitos de asistencia lingüística. Llame al [phone].    We comply with applicable federal civil rights laws and [place] laws. We do not discriminate on the basis of race, color, national origin, age, disability sex, sexual orientation or gender identity.            Thank you!     Thank you for choosing [facility] [place]  for your care. Our goal is always to provide you with excellent care. Hearing back from our patients is one way we can continue to improve our services. Please take a few minutes to complete the written survey that you may receive in the mail after your visit with us. Thank you!             Your Updated Medication List - Protect others around you: Learn how to safely use, " store and throw away your medicines at www.disposemymeds.org.          This list is accurate as of: 8/9/17 10:37 AM.  Always use your most recent med list.                   Brand Name Dispense Instructions for use Diagnosis    hydrochlorothiazide 25 MG tablet    HYDRODIURIL    90 tablet    Take 1 tablet (25 mg) by mouth daily    Benign essential hypertension       metoprolol 50 MG 24 hr tablet    TOPROL-XL    90 tablet    Take 1 tablet (50 mg) by mouth daily    Benign essential hypertension       phentermine 30 MG capsule     30 capsule    Take 1 capsule (30 mg) by mouth every morning    Morbid obesity due to excess calories (H)

## 2017-08-10 LAB
C TRACH DNA SPEC QL NAA+PROBE: NORMAL
HBV SURFACE AG SERPL QL IA: NONREACTIVE
HCV AB SERPL QL IA: NORMAL
HIV 1+2 AB+HIV1 P24 AG SERPL QL IA: NORMAL
N GONORRHOEA DNA SPEC QL NAA+PROBE: NORMAL
SPECIMEN SOURCE: NORMAL
SPECIMEN SOURCE: NORMAL
T PALLIDUM IGG+IGM SER QL: NEGATIVE

## 2017-08-16 PROBLEM — R87.610 PAPANICOLAOU SMEAR OF CERVIX WITH ATYPICAL SQUAMOUS CELLS OF UNDETERMINED SIGNIFICANCE (ASC-US): Status: ACTIVE | Noted: 2017-08-16

## 2017-08-16 LAB
COPATH REPORT: ABNORMAL
PAP: ABNORMAL

## 2017-08-17 ENCOUNTER — HOSPITAL ENCOUNTER (OUTPATIENT)
Facility: HOSPITAL | Age: 52
End: 2017-08-17
Attending: SURGERY | Admitting: SURGERY
Payer: COMMERCIAL

## 2017-08-17 ENCOUNTER — TELEPHONE (OUTPATIENT)
Dept: SURGERY | Facility: OTHER | Age: 52
End: 2017-08-17

## 2017-08-17 DIAGNOSIS — Z12.11 SCREENING FOR COLON CANCER: Primary | ICD-10-CM

## 2017-08-17 DIAGNOSIS — Z12.11 SPECIAL SCREENING FOR MALIGNANT NEOPLASMS, COLON: Primary | ICD-10-CM

## 2017-08-17 RX ORDER — SODIUM, POTASSIUM,MAG SULFATES 17.5-3.13G
1 SOLUTION, RECONSTITUTED, ORAL ORAL 2 TIMES DAILY
Qty: 2 BOTTLE | Refills: 0 | Status: SHIPPED | OUTPATIENT
Start: 2017-08-17 | End: 2018-02-28

## 2017-08-17 NOTE — PATIENT INSTRUCTIONS
Thank you for allowing  and our surgical team to participate in your care.  If you have a scheduling or an appointment question please contact Regina, our Health Unit Coordinator, at her direct line [phone].   ALL nursing questions or concerns can be directed to your surgical nurse at: [phone] [place]    You are scheduled for a: Colonoscopy with possible biopsy  Your procedure date is: Friday October 13th with Dr Gracia     You have been ordered Suprep as your mechanical bowel prep. Please pick this up from your preferred pharmacy.    Bowel Prep    Clear liquid diet only on Thursday October 12th,  the day before the examination.    Sanches prep - one bottle at 6pm Thursday October 12th, and follow with Two 16 ounce glasses water.    Sanches prep - one bottle at 0400 on Friday October 13th, the day of the exam.  Follow with Two 16 ounce glasses of water.    Nothing by mouth after finishing the second 16 ounce glass of water the morning of the procedure.       HOW TO PREPARE-        You need a friend or family member available to drive you home AND stay with you for 4 hours after you leave the hospital. You will not be allowed to drive yourself. IF you need to take a taxi or the bus you MUST have a responsible person to ride with you. YOUR PROCEDURE WILL BE CANCELLED IF YOU DO NOT HAVE A RESPONSIBLE ADULT TO DRIVE YOU HOME.       You need to call our Surgery Education Nurses 1-2 weeks prior to your surgery date at [phone] or toll free [phone]. Please have you medication and allergy lists ready.       Stop your aspirin or other NSAIDs(Ibuprofen, Motrin, Aleve, Celebrex, Naproxen, etc...) 7 days before your surgery.      Hospital admitting will call you the day before your surgery with your arrival time. If you are scheduled on a Monday admitting will call you the Friday before.      Please call your primary care physician if you should become ill within 24 hours of scheduled surgery. (ex.vomiting,  diarrhea, fever)

## 2017-08-17 NOTE — TELEPHONE ENCOUNTER
Patient contacted regarding referral sent by Sunitha Richmond for a colonoscopy. Patient was approved by the surgery education department for a meet and greet colonoscopy. Patient has chosen Friday October 13th with Dr Gracia for her colonoscopy. All information sent to patient via mail including surgery book, bowel prep instructions and surgery department numbers incase an issue arises.

## 2017-08-17 NOTE — MR AVS SNAPSHOT
After Visit Summary   8/17/2017    Nadeen Robles    MRN: [id number]           Patient Information     Date Of Birth          1965        Visit Information        Provider Department      8/17/2017 2:36 PM Jameson Gracia, DO [facility]        Today's Diagnoses     Screening for colon cancer    -  1      Care Instructions    Thank you for allowing  and our surgical team to participate in your care.  If you have a scheduling or an appointment question please contact Regina, our Health Unit Coordinator, at her direct line [phone].   ALL nursing questions or concerns can be directed to your surgical nurse at: [phone] [place]    You are scheduled for a: Colonoscopy with possible biopsy  Your procedure date is: Friday October 13th with Dr Gracia     You have been ordered Suprep as your mechanical bowel prep. Please pick this up from your preferred pharmacy.    Bowel Prep    Clear liquid diet only on Thursday October 12th,  the day before the examination.    Sanches prep - one bottle at 6pm Thursday October 12th, and follow with Two 16 ounce glasses water.    Sanches prep - one bottle at 0400 on Friday October 13th, the day of the exam.  Follow with Two 16 ounce glasses of water.    Nothing by mouth after finishing the second 16 ounce glass of water the morning of the procedure.       HOW TO PREPARE-        You need a friend or family member available to drive you home AND stay with you for 4 hours after you leave the hospital. You will not be allowed to drive yourself. IF you need to take a taxi or the bus you MUST have a responsible person to ride with you. YOUR PROCEDURE WILL BE CANCELLED IF YOU DO NOT HAVE A RESPONSIBLE ADULT TO DRIVE YOU HOME.       You need to call our Surgery Education Nurses 1-2 weeks prior to your surgery date at [phone] or toll free [phone]. Please have you medication and allergy lists ready.       Stop your aspirin or other  NSAIDs(Ibuprofen, Motrin, Aleve, Celebrex, Naproxen, etc...) 7 days before your surgery.      Hospital admitting will call you the day before your surgery with your arrival time. If you are scheduled on a Monday admitting will call you the Friday before.      Please call your primary care physician if you should become ill within 24 hours of scheduled surgery. (ex.vomiting, diarrhea, fever)            Follow-ups after your visit        Your next 10 appointments already scheduled     Nov 07, 2017  9:40 AM CST   (Arrive by 9:25 AM)   SHORT with Rhoda Sky MD   [facility] [place] ([facility] [place] )    [address]  [place] [address]   [phone]            Feb 13, 2018  9:30 AM CST   (Arrive by 9:15 AM)   SHORT with Rhoda Sky MD   [facility] [place] )    [address]  [place] [address]   [phone]              Who to contact     If you have questions or need follow up information about today's clinic visit or your schedule please contact [facility] directly at [phone].  Normal or non-critical lab and imaging results will be communicated to you by MyChart, letter or phone within 4 business days after the clinic has received the results. If you do not hear from us within 7 days, please contact the clinic through MyChart or phone. If you have a critical or abnormal lab result, we will notify you by phone as soon as possible.  Submit refill requests through [employer] or call your pharmacy and they will forward the refill request to us. Please allow 3 business days for your refill to be completed.          Additional Information About Your Visit        [employer] Information     [employer] gives you secure access to your electronic health record. If you see a primary care provider, you can also send messages to your care team and make appointments. If you have questions, please call your primary care clinic.  If you do not  have a primary care provider, please call [phone] and they will assist you.        Care EveryWhere ID     This is your Care EveryWhere ID. This could be used by other organizations to access your [place] medical records  [id number]        Your Vitals Were     Last Period                   06/25/2017            Blood Pressure from Last 3 Encounters:   08/09/17 122/70   07/18/17 118/76   07/07/17 106/64    Weight from Last 3 Encounters:   08/09/17 181 lb (82.1 kg)   07/18/17 189 lb (85.7 kg)   07/07/17 183 lb (83 kg)              We Performed the Following     SURGERY ORDER        Primary Care Provider Office Phone # Fax #    RENETTA Stallworth [phone]       [facility] [place] [address]  [place] [address]        Equal Access to Services     DIANA BOYCE : Hadii aad ku hadasho Soomaali, waaxda luqadaha, qaybta kaalmada adeegyada, luisa garciain hayaan adearmando collins . So [facility] [phone].    ATENCIÓN: Si habla español, tiene a johnson disposición servicios gratuitos de asistencia lingüística. Llame al [phone].    We comply with applicable federal civil rights laws and [place] laws. We do not discriminate on the basis of race, color, national origin, age, disability sex, sexual orientation or gender identity.            Thank you!     Thank you for choosing [facility] [place]  for your care. Our goal is always to provide you with excellent care. Hearing back from our patients is one way we can continue to improve our services. Please take a few minutes to complete the written survey that you may receive in the mail after your visit with us. Thank you!             Your Updated Medication List - Protect others around you: Learn how to safely use, store and throw away your medicines at www.disposemymeds.org.          This list is accurate as of: 8/17/17  2:45 PM.  Always use your most recent med list.                   Brand Name Dispense Instructions for use Diagnosis     hydrochlorothiazide 25 MG tablet    HYDRODIURIL    90 tablet    Take 1 tablet (25 mg) by mouth daily    Benign essential hypertension       metoprolol 50 MG 24 hr tablet    TOPROL-XL    90 tablet    Take 1 tablet (50 mg) by mouth daily    Benign essential hypertension       nystatin-triamcinolone cream    MYCOLOG II    30 g    Apply topically 2 times daily    Vulvar irritation       phentermine 30 MG capsule     30 capsule    Take 1 capsule (30 mg) by mouth every morning    Morbid obesity due to excess calories (H)

## 2017-09-01 ENCOUNTER — TELEPHONE (OUTPATIENT)
Dept: SURGERY | Facility: OTHER | Age: 52
End: 2017-09-01

## 2017-09-01 NOTE — TELEPHONE ENCOUNTER
Patient has things going on and would like to cancel her colonoscopy for now, and will call at a later date to reschedule her procedure. Patient has all contact information and has no further questions at this time.

## 2017-09-01 NOTE — TELEPHONE ENCOUNTER
Patient called and needs to reschedule her colonoscopy that is in October with Dr Gracia. Please call patient back to reschedule this procedure at [phone].

## 2018-01-30 ENCOUNTER — OFFICE VISIT (OUTPATIENT)
Dept: FAMILY MEDICINE | Facility: OTHER | Age: 53
End: 2018-01-30
Attending: PHYSICIAN ASSISTANT
Payer: COMMERCIAL

## 2018-01-30 VITALS
OXYGEN SATURATION: 98 % | SYSTOLIC BLOOD PRESSURE: 120 MMHG | TEMPERATURE: 97.5 F | WEIGHT: 184 LBS | HEART RATE: 67 BPM | RESPIRATION RATE: 16 BRPM | HEIGHT: 65 IN | DIASTOLIC BLOOD PRESSURE: 70 MMHG | BODY MASS INDEX: 30.66 KG/M2

## 2018-01-30 DIAGNOSIS — I10 BENIGN ESSENTIAL HYPERTENSION: ICD-10-CM

## 2018-01-30 DIAGNOSIS — R60.0 LOCALIZED EDEMA: Primary | ICD-10-CM

## 2018-01-30 DIAGNOSIS — Z12.11 SPECIAL SCREENING FOR MALIGNANT NEOPLASMS, COLON: ICD-10-CM

## 2018-01-30 PROCEDURE — 99213 OFFICE O/P EST LOW 20 MIN: CPT | Performed by: PHYSICIAN ASSISTANT

## 2018-01-30 RX ORDER — METOPROLOL SUCCINATE 50 MG/1
50 TABLET, EXTENDED RELEASE ORAL DAILY
Qty: 90 TABLET | Refills: 3 | Status: SHIPPED | OUTPATIENT
Start: 2018-01-30 | End: 2018-11-02

## 2018-01-30 RX ORDER — CHLORTHALIDONE 25 MG/1
25 TABLET ORAL DAILY
Qty: 90 TABLET | Refills: 1 | Status: SHIPPED | OUTPATIENT
Start: 2018-01-30 | End: 2018-02-28

## 2018-01-30 ASSESSMENT — ANXIETY QUESTIONNAIRES
6. BECOMING EASILY ANNOYED OR IRRITABLE: NOT AT ALL
5. BEING SO RESTLESS THAT IT IS HARD TO SIT STILL: NOT AT ALL
2. NOT BEING ABLE TO STOP OR CONTROL WORRYING: NOT AT ALL
3. WORRYING TOO MUCH ABOUT DIFFERENT THINGS: NOT AT ALL
GAD7 TOTAL SCORE: 0
4. TROUBLE RELAXING: NOT AT ALL
1. FEELING NERVOUS, ANXIOUS, OR ON EDGE: NOT AT ALL
7. FEELING AFRAID AS IF SOMETHING AWFUL MIGHT HAPPEN: NOT AT ALL
IF YOU CHECKED OFF ANY PROBLEMS ON THIS QUESTIONNAIRE, HOW DIFFICULT HAVE THESE PROBLEMS MADE IT FOR YOU TO DO YOUR WORK, TAKE CARE OF THINGS AT HOME, OR GET ALONG WITH OTHER PEOPLE: NOT DIFFICULT AT ALL

## 2018-01-30 ASSESSMENT — PAIN SCALES - GENERAL: PAINLEVEL: NO PAIN (0)

## 2018-01-30 NOTE — PROGRESS NOTES
"Chief Complaint   Patient presents with     Hypertension     BP medication is doing well BP today is 120/70.  Does relate edema in ankles and toes at end of day, wonders if dose can be increased to help with this.     Weight Problem     doing well with current medication started at 210# at 184# today.     Menopausal Sx     increased heaviness of flow, with clots and shorter duration between cycles at 14 days this last month. Wonders if aging or stress related       Initial /70  Pulse 67  Temp 97.5  F (36.4  C) (Tympanic)  Resp 16  Ht 5' 5\" (1.651 m)  Wt 184 lb (83.5 kg)  SpO2 98%  BMI 30.62 kg/m2 Estimated body mass index is 30.62 kg/(m^2) as calculated from the following:    Height as of this encounter: 5' 5\" (1.651 m).    Weight as of this encounter: 184 lb (83.5 kg).  Medication Reconciliation: complete   Tana Rey      "

## 2018-01-30 NOTE — MR AVS SNAPSHOT
After Visit Summary   1/30/2018    Nadeen Robles    MRN: [id number]           Patient Information     Date Of Birth          1965        Visit Information        Provider Department      1/30/2018 9:15 AM Sunitha Lindsey PA [facility]        Today's Diagnoses     Localized edema    -  1    Benign essential hypertension        Special screening for malignant neoplasms, colon           Follow-ups after your visit        Your next 10 appointments already scheduled     Feb 13, 2018  9:30 AM CST   (Arrive by 9:15 AM)   SHORT with Rhoda Sky MD   [facility] ([facility] )    [address]  [facility] [address]   [phone]            Feb 28, 2018  9:00 AM CST   (Arrive by 8:45 AM)   SHORT with RENETTA Stallworth   [facility] ([facility] )    [address]  [facility] [address]   [phone]              Future tests that were ordered for you today     Open Future Orders        Priority Expected Expires Ordered    Immunos occult blood Routine  1/30/2019 1/30/2018            Who to contact     If you have questions or need follow up information about today's clinic visit or your schedule please contact [facility] directly at [phone].  Normal or non-critical lab and imaging results will be communicated to you by MyChart, letter or phone within 4 business days after the clinic has received the results. If you do not hear from us within 7 days, please contact the clinic through MyChart or phone. If you have a critical or abnormal lab result, we will notify you by phone as soon as possible.  Submit refill requests through [employer] or call your pharmacy and they will forward the refill request to us. Please allow 3 business days for your refill to be completed.          Additional Information About Your Visit        [employer] Information     [employer] gives you secure access to your  "electronic health record. If you see a primary care provider, you can also send messages to your care team and make appointments. If you have questions, please call your primary care clinic.  If you do not have a primary care provider, please call [phone] and they will assist you.        Care EveryWhere ID     This is your Care EveryWhere ID. This could be used by other organizations to access your [facility] medical records  [id number]        Your Vitals Were     Pulse Temperature Respirations Height Pulse Oximetry BMI (Body Mass Index)    67 97.5  F (36.4  C) (Tympanic) 16 5' 5\" (1.651 m) 98% 30.62 kg/m2       Blood Pressure from Last 3 Encounters:   01/30/18 120/70   08/09/17 122/70   07/18/17 118/76    Weight from Last 3 Encounters:   01/30/18 184 lb (83.5 kg)   08/09/17 181 lb (82.1 kg)   07/18/17 189 lb (85.7 kg)                 Today's Medication Changes          These changes are accurate as of 1/30/18 11:51 AM.  If you have any questions, ask your nurse or doctor.               Start taking these medicines.        Dose/Directions    chlorthalidone 25 MG tablet   Commonly known as:  HYGROTON   Used for:  Benign essential hypertension   Started by:  Sunitha Lindsey PA        Dose:  25 mg   Take 1 tablet (25 mg) by mouth daily   Quantity:  90 tablet   Refills:  1         Stop taking these medicines if you haven't already. Please contact your care team if you have questions.     hydrochlorothiazide 25 MG tablet   Commonly known as:  HYDRODIURIL   Stopped by:  Sunitha Lindsey PA                Where to get your medicines      These medications were sent to [id number] - [place], [place] - [address]  [address] [place] [address]     Phone:  [phone]     chlorthalidone 25 MG tablet    metoprolol succinate 50 MG 24 hr tablet                Primary Care Provider Office Phone # Fax #    RENETTA Stallworth [phone]       [facility] [address] [place] [address]        Equal Access to Services     [facility] CARLI : Hadii stephanie lancaster myrandafavian Soadrianoali, washainada luqadaha, qaybta monicachularobert gill, luisa landrymilesmohinder yee. So [facility] [phone].    ATENCIÓN: Si habla español, tiene a johnson disposición servicios gratuitos de asistencia lingüística. [facility] [phone].    We comply with applicable federal civil rights laws and [place] laws. We do not discriminate on the basis of race, color, national origin, age, disability, sex, sexual orientation, or gender identity.            Thank you!     Thank you for choosing [facility]  for your care. Our goal is always to provide you with excellent care. Hearing back from our patients is one way we can continue to improve our services. Please take a few minutes to complete the written survey that you may receive in the mail after your visit with us. Thank you!             Your Updated Medication List - Protect others around you: Learn how to safely use, store and throw away your medicines at www.disposemymeds.org.          This list is accurate as of 1/30/18 11:51 AM.  Always use your most recent med list.                   Brand Name Dispense Instructions for use Diagnosis    chlorthalidone 25 MG tablet    HYGROTON    90 tablet    Take 1 tablet (25 mg) by mouth daily    Benign essential hypertension       metoprolol succinate 50 MG 24 hr tablet    TOPROL-XL    90 tablet    Take 1 tablet (50 mg) by mouth daily    Benign essential hypertension       Na Sulfate-K Sulfate-Mg Sulf solution    SUPREP BOWEL PREP    2 Bottle    Take 177 mLs (1 Bottle) by mouth 2 times daily    Special screening for malignant neoplasms, colon       phentermine 30 MG capsule     30 capsule    Take 1 capsule (30 mg) by mouth every morning    Morbid obesity due to excess calories (H)

## 2018-01-30 NOTE — NURSING NOTE
"Chief Complaint   Patient presents with     Hypertension     BP medication is doing well BP today is 120/70.  Does relate edema in ankles and toes at end of day, wonders if dose can be increased to help with this.     Weight Problem     doing well with current medication started at 210# at 184# today.     Menopausal Sx     increased heaviness of flow, with clots and shorter duration between cycles at 14 days this last month. Wonders if aging or stress related     Declined flu shot today, never gets.  Requesting IFOB does not have a  for colonoscopy.    Initial /70  Pulse 67  Temp 97.5  F (36.4  C) (Tympanic)  Resp 16  Ht 5' 5\" (1.651 m)  Wt 184 lb (83.5 kg)  SpO2 98%  BMI 30.62 kg/m2 Estimated body mass index is 30.62 kg/(m^2) as calculated from the following:    Height as of this encounter: 5' 5\" (1.651 m).    Weight as of this encounter: 184 lb (83.5 kg).  Medication Reconciliation: complete   Tana Rey      "

## 2018-01-30 NOTE — PROGRESS NOTES
Subjective:  Nadeen Robles is a 52 year old female who presents for follow up of hypertension. Other concern is 1 month history of daily ankle swelling at end of day. Denies chest pain, palpitations, SOB. Next patient is unable to do colonoscopy anytime in next several months.  Lab Results   Component Value Date    CHOL 210 07/18/2017     Lab Results   Component Value Date    HDL 61 07/18/2017     Lab Results   Component Value Date     07/18/2017     Lab Results   Component Value Date    TRIG 146 07/18/2017     Lab Results   Component Value Date    CHOLHDLRATIO 3.8 02/23/2015     Last Basic Metabolic Panel:  Lab Results   Component Value Date     07/18/2017      Lab Results   Component Value Date    POTASSIUM 3.9 07/18/2017     Lab Results   Component Value Date    CHLORIDE 105 07/18/2017     Lab Results   Component Value Date    JESSICA 9.4 07/18/2017     Lab Results   Component Value Date    CO2 26 07/18/2017     Lab Results   Component Value Date    BUN 19 07/18/2017     Lab Results   Component Value Date    CR 0.72 07/18/2017     Lab Results   Component Value Date    GLC 96 07/18/2017       Active diagnoses this visit:     Benign essential hypertension  Special screening for malignant neoplasms, colon    PMH, PSH, FH reviewed and unchanged    Current Outpatient Prescriptions   Medication     metoprolol succinate (TOPROL-XL) 50 MG 24 hr tablet     chlorthalidone (HYGROTON) 25 MG tablet     phentermine 30 MG capsule     Na Sulfate-K Sulfate-Mg Sulf (SUPREP BOWEL PREP) solution     [DISCONTINUED] metoprolol (TOPROL-XL) 50 MG 24 hr tablet     No current facility-administered medications for this visit.        Allergies   Allergen Reactions     Nkda [No Known Drug Allergies]        Review of Systems:  Gen: recent illness, change in weight  Derm: denies rash, moles or lesions  Resp: denies significant cough, SOB or wheeze  CV: denies chest pain, palpitations   Psych: denies depressed mood or  anxiety    Objective:    B/P: 120/70, T: 97.5, P: 67, R: 16  Body mass index is 30.62 kg/(m^2).    Physical Exam:  Constitutional: healthy, alert and no distress  CV: RRR. No murmur. No peripheral edema on exam this AM.  Pulm: Lungs clear to auscultation without wheeze, rales or rhonchi.   Skin: no suspicious lesions or rashes  Psych: Alert, orientated      Assessment/Plan  (R60.0) Localized edema  (primary encounter diagnosis)  Comment:Watch salt in diet. Switch from HCTZ to Chlorthalidone to see if this may make a difference although she understands that dietary changes will be most effective.    (I10) Benign essential hypertension  Comment: as above  Plan: metoprolol succinate (TOPROL-XL) 50 MG 24 hr         tablet, chlorthalidone (HYGROTON) 25 MG tablet            (Z12.11) Special screening for malignant neoplasms, colon  Plan: Immunos occult blood                    Rx per EPIC.  Risk/benefit/side effects and intended purposes discussed.   Follow up in 6 months. Sooner prn.    Sunitha Lindsey

## 2018-01-31 DIAGNOSIS — E66.01 MORBID OBESITY DUE TO EXCESS CALORIES (H): ICD-10-CM

## 2018-01-31 RX ORDER — PHENTERMINE HYDROCHLORIDE 30 MG/1
30 CAPSULE ORAL EVERY MORNING
Qty: 30 CAPSULE | Refills: 5 | Status: SHIPPED | OUTPATIENT
Start: 2018-01-31 | End: 2018-11-02

## 2018-01-31 ASSESSMENT — PATIENT HEALTH QUESTIONNAIRE - PHQ9: SUM OF ALL RESPONSES TO PHQ QUESTIONS 1-9: 0

## 2018-01-31 ASSESSMENT — ANXIETY QUESTIONNAIRES: GAD7 TOTAL SCORE: 0

## 2018-01-31 NOTE — TELEPHONE ENCOUNTER
Pt calls she was supposed to have this filled at her appt yesterday with Sunitha Lindsey, but was not.

## 2018-01-31 NOTE — TELEPHONE ENCOUNTER
phentermine 30 MG capsule  Last Written Prescription Date:  7/7/17  Last Fill Quantity: 30,   # refills: 5  Last Office Visit: 1/30/18  Future Office visit:    Next 5 appointments (look out 90 days)     Feb 13, 2018  9:30 AM CST   (Arrive by 9:15 AM)   SHORT with Rhoda Sky MD   [facility] ([facility] )    [address] [place] [address]   [phone]            Feb 28, 2018  9:00 AM CST   (Arrive by 8:45 AM)   SHORT with RENETTA Stallworth   [facility] ([facility] )    [address] [place] [address]   [phone]

## 2018-02-01 DIAGNOSIS — Z12.11 SPECIAL SCREENING FOR MALIGNANT NEOPLASMS, COLON: ICD-10-CM

## 2018-02-01 PROCEDURE — 82274 ASSAY TEST FOR BLOOD FECAL: CPT | Performed by: PHYSICIAN ASSISTANT

## 2018-02-02 LAB — HEMOCCULT SP1 STL QL: NEGATIVE

## 2018-02-28 ENCOUNTER — OFFICE VISIT (OUTPATIENT)
Dept: FAMILY MEDICINE | Facility: OTHER | Age: 53
End: 2018-02-28
Attending: PHYSICIAN ASSISTANT
Payer: COMMERCIAL

## 2018-02-28 VITALS
SYSTOLIC BLOOD PRESSURE: 104 MMHG | TEMPERATURE: 96.8 F | HEIGHT: 65 IN | DIASTOLIC BLOOD PRESSURE: 68 MMHG | BODY MASS INDEX: 30.16 KG/M2 | WEIGHT: 181 LBS | OXYGEN SATURATION: 98 % | HEART RATE: 63 BPM

## 2018-02-28 DIAGNOSIS — I10 BENIGN ESSENTIAL HYPERTENSION: ICD-10-CM

## 2018-02-28 DIAGNOSIS — E87.6 HYPOKALEMIA: ICD-10-CM

## 2018-02-28 DIAGNOSIS — R60.0 LOCALIZED EDEMA: Primary | ICD-10-CM

## 2018-02-28 LAB
ERYTHROCYTE [DISTWIDTH] IN BLOOD BY AUTOMATED COUNT: 13.2 % (ref 10–15)
HCT VFR BLD AUTO: 41.6 % (ref 35–47)
HGB BLD-MCNC: 15.1 G/DL (ref 11.7–15.7)
MCH RBC QN AUTO: 29.3 PG (ref 26.5–33)
MCHC RBC AUTO-ENTMCNC: 36.3 G/DL (ref 31.5–36.5)
MCV RBC AUTO: 81 FL (ref 78–100)
PLATELET # BLD AUTO: 309 10E9/L (ref 150–450)
RBC # BLD AUTO: 5.15 10E12/L (ref 3.8–5.2)
WBC # BLD AUTO: 5.4 10E9/L (ref 4–11)

## 2018-02-28 PROCEDURE — 80061 LIPID PANEL: CPT | Performed by: PHYSICIAN ASSISTANT

## 2018-02-28 PROCEDURE — 85027 COMPLETE CBC AUTOMATED: CPT | Performed by: PHYSICIAN ASSISTANT

## 2018-02-28 PROCEDURE — 99213 OFFICE O/P EST LOW 20 MIN: CPT | Performed by: PHYSICIAN ASSISTANT

## 2018-02-28 PROCEDURE — 80048 BASIC METABOLIC PNL TOTAL CA: CPT | Performed by: PHYSICIAN ASSISTANT

## 2018-02-28 PROCEDURE — 36415 COLL VENOUS BLD VENIPUNCTURE: CPT | Performed by: PHYSICIAN ASSISTANT

## 2018-02-28 RX ORDER — POTASSIUM CHLORIDE 1500 MG/1
TABLET, EXTENDED RELEASE ORAL
Qty: 30 TABLET | Refills: 0 | Status: SHIPPED | OUTPATIENT
Start: 2018-02-28 | End: 2018-03-18

## 2018-02-28 RX ORDER — CHLORTHALIDONE 25 MG/1
25 TABLET ORAL DAILY
Qty: 90 TABLET | Refills: 1 | Status: SHIPPED | OUTPATIENT
Start: 2018-02-28 | End: 2018-10-11

## 2018-02-28 ASSESSMENT — ANXIETY QUESTIONNAIRES
GAD7 TOTAL SCORE: 0
3. WORRYING TOO MUCH ABOUT DIFFERENT THINGS: NOT AT ALL
6. BECOMING EASILY ANNOYED OR IRRITABLE: NOT AT ALL
5. BEING SO RESTLESS THAT IT IS HARD TO SIT STILL: NOT AT ALL
2. NOT BEING ABLE TO STOP OR CONTROL WORRYING: NOT AT ALL
7. FEELING AFRAID AS IF SOMETHING AWFUL MIGHT HAPPEN: NOT AT ALL
1. FEELING NERVOUS, ANXIOUS, OR ON EDGE: NOT AT ALL

## 2018-02-28 ASSESSMENT — PAIN SCALES - GENERAL: PAINLEVEL: NO PAIN (0)

## 2018-02-28 ASSESSMENT — PATIENT HEALTH QUESTIONNAIRE - PHQ9: 5. POOR APPETITE OR OVEREATING: NOT AT ALL

## 2018-02-28 NOTE — PROGRESS NOTES
Subjective:  Nadeen Robles is a 52 year old female who presents for follow up of hypertension and ankle edema. Started Chlorthalidone 25 mg 1 -30. Ankle edema resolved. Denies chest pain, palpitations, SOB   Lab Results   Component Value Date    CHOL 210 07/18/2017     Lab Results   Component Value Date    HDL 61 07/18/2017     Lab Results   Component Value Date     07/18/2017     Lab Results   Component Value Date    TRIG 146 07/18/2017     Lab Results   Component Value Date    CHOLHDLRATIO 3.8 02/23/2015     Last Basic Metabolic Panel:  Lab Results   Component Value Date     07/18/2017      Lab Results   Component Value Date    POTASSIUM 3.9 07/18/2017     Lab Results   Component Value Date    CHLORIDE 105 07/18/2017     Lab Results   Component Value Date    JESSICA 9.4 07/18/2017     Lab Results   Component Value Date    CO2 26 07/18/2017     Lab Results   Component Value Date    BUN 19 07/18/2017     Lab Results   Component Value Date    CR 0.72 07/18/2017     Lab Results   Component Value Date    GLC 96 07/18/2017       Active diagnoses this visit:  Data Unavailable    PMH, PSH, FH reviewed and unchanged    Current Outpatient Prescriptions   Medication     phentermine 30 MG capsule     metoprolol succinate (TOPROL-XL) 50 MG 24 hr tablet     chlorthalidone (HYGROTON) 25 MG tablet     No current facility-administered medications for this visit.        Allergies   Allergen Reactions     Nkda [No Known Drug Allergies]        Review of Systems:  Gen: recent illness, change in weight  Derm: denies rash, moles or lesions  Resp: denies significant cough, SOB or wheeze  CV: denies chest pain, palpitations or peripheral edema  Psych: denies depressed mood or anxiety    Objective:    B/P: 104/68, T: 96.8, P: 63, R: Data Unavailable  Body mass index is 30.12 kg/(m^2).    Physical Exam:  Constitutional: healthy, alert and no distress  CV: RRR. No murmur. No peripheral edema  Pulm: Lungs clear to auscultation  without wheeze, rales or rhonchi.   Skin: no suspicious lesions or rashes  Psych: Alert, orientated      Assessment/Plan  (R60.0) Localized edema  (primary encounter diagnosis)  Comment: resolved. Continue same plan      (I10) Benign essential hypertension  Comment: labs  Plan: chlorthalidone (HYGROTON) 25 MG tablet, Basic         metabolic panel, Lipid Profile, CBC with         platelets                    Rx per EPIC.  Risk/benefit/side effects and intended purposes discussed.   Follow up in 6 months. Sooner prn.    Sunitha Lindsey     Note: K+ today 2.6. Pt notified. K+20 meq with lab recheck in 3 days

## 2018-02-28 NOTE — NURSING NOTE
"Chief Complaint   Patient presents with     RECHECK     Edema is better, HTN, pt is due for labs       Initial /68  Pulse 63  Temp 96.8  F (36  C)  Ht 5' 5\" (1.651 m)  Wt 181 lb (82.1 kg)  SpO2 98%  BMI 30.12 kg/m2 Estimated body mass index is 30.12 kg/(m^2) as calculated from the following:    Height as of this encounter: 5' 5\" (1.651 m).    Weight as of this encounter: 181 lb (82.1 kg).  Medication Reconciliation: complete   Justyna Caraballo    "

## 2018-02-28 NOTE — MR AVS SNAPSHOT
"              After Visit Summary   2/28/2018    Nadeen Robles    MRN: [id number]           Patient Information     Date Of Birth          1965        Visit Information        Provider Department      2/28/2018 9:00 AM Sunitha Lindsey PA [facility]        Today's Diagnoses     Localized edema    -  1    Benign essential hypertension           Follow-ups after your visit        Who to contact     If you have questions or need follow up information about today's clinic visit or your schedule please contact [facility] directly at [phone].  Normal or non-critical lab and imaging results will be communicated to you by [employer], letter or phone within 4 business days after the clinic has received the results. If you do not hear from us within 7 days, please contact the clinic through [employer] or phone. If you have a critical or abnormal lab result, we will notify you by phone as soon as possible.  Submit refill requests through [employer] or call your pharmacy and they will forward the refill request to us. Please allow 3 business days for your refill to be completed.          Additional Information About Your Visit        MyChart Information     [employer] gives you secure access to your electronic health record. If you see a primary care provider, you can also send messages to your care team and make appointments. If you have questions, please call your primary care clinic.  If you do not have a primary care provider, please call [phone] and they will assist you.        Care EveryWhere ID     This is your Care EveryWhere ID. This could be used by other organizations to access your [facility] medical records  [id number]        Your Vitals Were     Pulse Temperature Height Pulse Oximetry BMI (Body Mass Index)       63 96.8  F (36  C) 5' 5\" (1.651 m) 98% 30.12 kg/m2        Blood Pressure from Last 3 Encounters:   02/28/18 104/68   01/30/18 120/70   08/09/17 122/70    Weight " from Last 3 Encounters:   02/28/18 181 lb (82.1 kg)   01/30/18 184 lb (83.5 kg)   08/09/17 181 lb (82.1 kg)              We Performed the Following     Basic metabolic panel     CBC with platelets     Lipid Profile          Today's Medication Changes          These changes are accurate as of 2/28/18  9:07 AM.  If you have any questions, ask your nurse or doctor.               Stop taking these medicines if you haven't already. Please contact your care team if you have questions.     Na Sulfate-K Sulfate-Mg Sulf solution   Commonly known as:  SUPREP BOWEL PREP   Stopped by:  Sunitha Lindsey PA                Where to get your medicines      These medications were sent to [id number] - [place], [place] - [address]  [address] [place] [address]     Phone:  [phone]     chlorthalidone 25 MG tablet                Primary Care Provider Office Phone # Fax #    RENETTA Stallworth [phone]       [facility] [address]  [place] [address]        Equal Access to Services     [facility]: Hadii aad ku hadasho Soomaali, waaxda luqadaha, qaybta kaalmada adeegyada, waxay idiin haygladysn radha collins . So [facility] [phone].    ATENCIÓN: Si habla español, tiene a johnson disposición servicios gratuitos de asistencia lingüística. Llame al [phone].    We comply with applicable federal civil rights laws and [place] laws. We do not discriminate on the basis of race, color, national origin, age, disability, sex, sexual orientation, or gender identity.            Thank you!     Thank you for choosing [facility]  for your care. Our goal is always to provide you with excellent care. Hearing back from our patients is one way we can continue to improve our services. Please take a few minutes to complete the written survey that you may receive in the mail after your visit with us. Thank you!             Your Updated Medication List - Protect others  around you: Learn how to safely use, store and throw away your medicines at www.disposemymeds.org.          This list is accurate as of 2/28/18  9:07 AM.  Always use your most recent med list.                   Brand Name Dispense Instructions for use Diagnosis    chlorthalidone 25 MG tablet    HYGROTON    90 tablet    Take 1 tablet (25 mg) by mouth daily    Benign essential hypertension       metoprolol succinate 50 MG 24 hr tablet    TOPROL-XL    90 tablet    Take 1 tablet (50 mg) by mouth daily    Benign essential hypertension       phentermine 30 MG capsule     30 capsule    Take 1 capsule (30 mg) by mouth every morning    Morbid obesity due to excess calories (H)

## 2018-03-01 LAB
ANION GAP SERPL CALCULATED.3IONS-SCNC: 9 MMOL/L (ref 3–14)
BUN SERPL-MCNC: 20 MG/DL (ref 7–30)
CALCIUM SERPL-MCNC: 9.5 MG/DL (ref 8.5–10.1)
CHLORIDE SERPL-SCNC: 101 MMOL/L (ref 94–109)
CHOLEST SERPL-MCNC: 189 MG/DL
CO2 SERPL-SCNC: 28 MMOL/L (ref 20–32)
CREAT SERPL-MCNC: 0.7 MG/DL (ref 0.52–1.04)
GFR SERPL CREATININE-BSD FRML MDRD: 88 ML/MIN/1.7M2
GLUCOSE SERPL-MCNC: 102 MG/DL (ref 70–99)
HDLC SERPL-MCNC: 64 MG/DL
LDLC SERPL CALC-MCNC: 103 MG/DL
NONHDLC SERPL-MCNC: 125 MG/DL
POTASSIUM SERPL-SCNC: 2.6 MMOL/L (ref 3.4–5.3)
SODIUM SERPL-SCNC: 138 MMOL/L (ref 133–144)
TRIGL SERPL-MCNC: 111 MG/DL

## 2018-03-01 ASSESSMENT — ANXIETY QUESTIONNAIRES: GAD7 TOTAL SCORE: 0

## 2018-03-01 ASSESSMENT — PATIENT HEALTH QUESTIONNAIRE - PHQ9: SUM OF ALL RESPONSES TO PHQ QUESTIONS 1-9: 0

## 2018-03-02 DIAGNOSIS — E87.6 HYPOKALEMIA: ICD-10-CM

## 2018-03-02 LAB
ANION GAP SERPL CALCULATED.3IONS-SCNC: 9 MMOL/L (ref 3–14)
BUN SERPL-MCNC: 27 MG/DL (ref 7–30)
CALCIUM SERPL-MCNC: 9.1 MG/DL (ref 8.5–10.1)
CHLORIDE SERPL-SCNC: 103 MMOL/L (ref 94–109)
CO2 SERPL-SCNC: 28 MMOL/L (ref 20–32)
CREAT SERPL-MCNC: 0.78 MG/DL (ref 0.52–1.04)
GFR SERPL CREATININE-BSD FRML MDRD: 78 ML/MIN/1.7M2
GLUCOSE SERPL-MCNC: 116 MG/DL (ref 70–99)
POTASSIUM SERPL-SCNC: 2.7 MMOL/L (ref 3.4–5.3)
SODIUM SERPL-SCNC: 140 MMOL/L (ref 133–144)

## 2018-03-02 PROCEDURE — 36415 COLL VENOUS BLD VENIPUNCTURE: CPT | Performed by: PHYSICIAN ASSISTANT

## 2018-03-02 PROCEDURE — 80048 BASIC METABOLIC PNL TOTAL CA: CPT | Performed by: PHYSICIAN ASSISTANT

## 2018-03-05 DIAGNOSIS — E87.6 HYPOKALEMIA: ICD-10-CM

## 2018-03-05 LAB
ANION GAP SERPL CALCULATED.3IONS-SCNC: 8 MMOL/L (ref 3–14)
BUN SERPL-MCNC: 13 MG/DL (ref 7–30)
CALCIUM SERPL-MCNC: 8.7 MG/DL (ref 8.5–10.1)
CHLORIDE SERPL-SCNC: 103 MMOL/L (ref 94–109)
CO2 SERPL-SCNC: 27 MMOL/L (ref 20–32)
CREAT SERPL-MCNC: 0.69 MG/DL (ref 0.52–1.04)
GFR SERPL CREATININE-BSD FRML MDRD: 90 ML/MIN/1.7M2
GLUCOSE SERPL-MCNC: 113 MG/DL (ref 70–99)
POTASSIUM SERPL-SCNC: 3.1 MMOL/L (ref 3.4–5.3)
SODIUM SERPL-SCNC: 138 MMOL/L (ref 133–144)

## 2018-03-05 PROCEDURE — 36415 COLL VENOUS BLD VENIPUNCTURE: CPT | Performed by: PHYSICIAN ASSISTANT

## 2018-03-05 PROCEDURE — 80048 BASIC METABOLIC PNL TOTAL CA: CPT | Performed by: PHYSICIAN ASSISTANT

## 2018-03-18 DIAGNOSIS — E87.6 HYPOKALEMIA: ICD-10-CM

## 2018-03-19 RX ORDER — POTASSIUM CHLORIDE 1500 MG/1
TABLET, EXTENDED RELEASE ORAL
Qty: 30 TABLET | Refills: 0 | Status: SHIPPED | OUTPATIENT
Start: 2018-03-19 | End: 2018-11-02

## 2018-03-19 NOTE — TELEPHONE ENCOUNTER
Please advise on Potassium.  Last lab on 3/5/18 with note to recheck in 2 days.  No recheck.  Dosage here said patient should remain the same.  Was at 40 meq qd.  Please advise on what dosage patient should be at.    Thank you.

## 2018-05-18 DIAGNOSIS — E87.6 HYPOKALEMIA: ICD-10-CM

## 2018-05-18 RX ORDER — POTASSIUM CHLORIDE 1500 MG/1
TABLET, EXTENDED RELEASE ORAL
Qty: 30 TABLET | Refills: 6 | Status: SHIPPED | OUTPATIENT
Start: 2018-05-18 | End: 2018-11-20

## 2018-05-18 NOTE — TELEPHONE ENCOUNTER
Potassium  Last Written Prescription Date: 3/19/18  Last Fill Quantity: 30 # of Refills: 0  Last Office Visit: 2/28/18

## 2018-10-11 DIAGNOSIS — I10 BENIGN ESSENTIAL HYPERTENSION: ICD-10-CM

## 2018-10-12 NOTE — TELEPHONE ENCOUNTER
CHLORTHALIDONE      Last Written Prescription Date:  2/28/18  Last Fill Quantity: 90,   # refills: 1  Last Office Visit: 2/28/18  Future Office visit:

## 2018-10-12 NOTE — TELEPHONE ENCOUNTER
Please advise on Potassium.    Patient had low Potassium on 3/5/18 with note:  Notes Recorded by Cristian Gamez MD on 3/5/2018 at 4:51 PM  Improved K.  Stay on same.  Recheck K only 2 days.   Thanks. . Me    Lab ordered but no recheck done.  Nurse to call?  Please advise.  Thank you.

## 2018-10-16 RX ORDER — CHLORTHALIDONE 25 MG/1
TABLET ORAL
Qty: 90 TABLET | Refills: 0 | Status: SHIPPED | OUTPATIENT
Start: 2018-10-16 | End: 2018-11-02

## 2018-10-31 ENCOUNTER — TELEPHONE (OUTPATIENT)
Dept: FAMILY MEDICINE | Facility: OTHER | Age: 53
End: 2018-10-31

## 2018-10-31 NOTE — PROGRESS NOTES
SUBJECTIVE:   Nadeen Robles is a 53 year old female who presents to clinic today for the following health issues:        Hypertension Follow-up      Outpatient blood pressures are not being checked.    Low Salt Diet: low salt      Amount of exercise or physical activity: 4-5 days/week for an average of 15-30 minutes    Problems taking medications regularly: No    Medication side effects: none    Diet: regular (no restrictions)            Problem list and histories reviewed & adjusted, as indicated.  Additional history: as documented    Patient Active Problem List   Diagnosis     Obesity     CARDIOVASCULAR SCREENING; LDL GOAL LESS THAN 160     Hypertension goal BP (blood pressure) < 140/90     Hypokalemia     ACP (advance care planning)     Exposure     Papanicolaou smear of cervix with atypical squamous cells of undetermined significance (ASC-US)     Past Surgical History:   Procedure Laterality Date     cholecystectomy  1991     HC REMOVAL GALLBLADDER  1990       Social History   Substance Use Topics     Smoking status: Never Smoker     Smokeless tobacco: Never Used      Comment: no 2nd hand smoke exposure     Alcohol use Yes      Comment: rarely     Family History   Problem Relation Age of Onset     Diabetes Mother      Other Cancer Mother 78     stage 4 lung cancer     Diabetes Father      Cerebrovascular Disease Father 75     Cardiovascular Father 75     heart attack         Current Outpatient Prescriptions   Medication Sig Dispense Refill     chlorthalidone (HYGROTON) 25 MG tablet TAKE 1 TABLET DAILY BY MOUTH 90 tablet 3     metoprolol succinate (TOPROL-XL) 50 MG 24 hr tablet Take 1 tablet (50 mg) by mouth daily 90 tablet 3     phentermine 30 MG capsule Take 1 capsule (30 mg) by mouth every morning 30 capsule 5     Potassium Chloride ER 20 MEQ TBCR TAKE 1 TABLET DAILY BY MOUTH 30 tablet 6     [DISCONTINUED] chlorthalidone (HYGROTON) 25 MG tablet TAKE 1 TABLET DAILY BY MOUTH 90 tablet 0     [DISCONTINUED]  "metoprolol succinate (TOPROL-XL) 50 MG 24 hr tablet Take 1 tablet (50 mg) by mouth daily 90 tablet 3     Allergies   Allergen Reactions     Nkda [No Known Drug Allergies]        Reviewed and updated as needed this visit by clinical staff       Reviewed and updated as needed this visit by Provider         ROS:  Constitutional, HEENT, cardiovascular, pulmonary, gi and gu systems are negative, except as otherwise noted.    OBJECTIVE:                                                    /70  Pulse 75  Temp 96.8  F (36  C) (Tympanic)  Resp 18  Ht 5' 5\" (1.651 m)  Wt 194 lb (88 kg)  LMP 10/02/2018 (Approximate)  SpO2 98%  BMI 32.28 kg/m2  Body mass index is 32.28 kg/(m^2).          Physical Exam:  Constitutional: healthy, alert and no acute distress  Skin: No suspicious rash or skin lesion  CV: RRR. No murmur  Pulm: Lungs clear to auscultation without wheeze, rales or rhonchi  Psych: mentation and affect appear normal                   ASSESSMENT/PLAN:                                                    1. Benign essential hypertension  Stable. Return for physical  - Lipid Profile  - CBC with platelets  - Comprehensive metabolic panel  - metoprolol succinate (TOPROL-XL) 50 MG 24 hr tablet; Take 1 tablet (50 mg) by mouth daily  Dispense: 90 tablet; Refill: 3  - chlorthalidone (HYGROTON) 25 MG tablet; TAKE 1 TABLET DAILY BY MOUTH  Dispense: 90 tablet; Refill: 3    2. Morbid obesity due to excess calories (H)    - phentermine 30 MG capsule; Take 1 capsule (30 mg) by mouth every morning  Dispense: 30 capsule; Refill: 5    3. Encounter for screening mammogram for breast cancer  - MA Screen Bilateral w/Jarod; Future      Follow up as discussed      RENETTA Marlow  [facility]  "

## 2018-11-02 ENCOUNTER — OFFICE VISIT (OUTPATIENT)
Dept: FAMILY MEDICINE | Facility: OTHER | Age: 53
End: 2018-11-02
Attending: PHYSICIAN ASSISTANT
Payer: COMMERCIAL

## 2018-11-02 VITALS
WEIGHT: 194 LBS | DIASTOLIC BLOOD PRESSURE: 70 MMHG | OXYGEN SATURATION: 98 % | RESPIRATION RATE: 18 BRPM | BODY MASS INDEX: 32.32 KG/M2 | SYSTOLIC BLOOD PRESSURE: 120 MMHG | HEART RATE: 75 BPM | HEIGHT: 65 IN | TEMPERATURE: 96.8 F

## 2018-11-02 DIAGNOSIS — E66.01 MORBID OBESITY DUE TO EXCESS CALORIES (H): ICD-10-CM

## 2018-11-02 DIAGNOSIS — I10 BENIGN ESSENTIAL HYPERTENSION: Primary | ICD-10-CM

## 2018-11-02 DIAGNOSIS — Z12.31 ENCOUNTER FOR SCREENING MAMMOGRAM FOR BREAST CANCER: ICD-10-CM

## 2018-11-02 LAB
ALBUMIN SERPL-MCNC: 4 G/DL (ref 3.4–5)
ALP SERPL-CCNC: 77 U/L (ref 40–150)
ALT SERPL W P-5'-P-CCNC: 39 U/L (ref 0–50)
ANION GAP SERPL CALCULATED.3IONS-SCNC: 8 MMOL/L (ref 3–14)
AST SERPL W P-5'-P-CCNC: 13 U/L (ref 0–45)
BILIRUB SERPL-MCNC: 0.5 MG/DL (ref 0.2–1.3)
BUN SERPL-MCNC: 27 MG/DL (ref 7–30)
CALCIUM SERPL-MCNC: 9.3 MG/DL (ref 8.5–10.1)
CHLORIDE SERPL-SCNC: 104 MMOL/L (ref 94–109)
CHOLEST SERPL-MCNC: 226 MG/DL
CO2 SERPL-SCNC: 27 MMOL/L (ref 20–32)
CREAT SERPL-MCNC: 0.83 MG/DL (ref 0.52–1.04)
ERYTHROCYTE [DISTWIDTH] IN BLOOD BY AUTOMATED COUNT: 13.1 % (ref 10–15)
GFR SERPL CREATININE-BSD FRML MDRD: 73 ML/MIN/1.7M2
GLUCOSE SERPL-MCNC: 108 MG/DL (ref 70–99)
HCT VFR BLD AUTO: 44.6 % (ref 35–47)
HDLC SERPL-MCNC: 61 MG/DL
HGB BLD-MCNC: 15.2 G/DL (ref 11.7–15.7)
LDLC SERPL CALC-MCNC: 135 MG/DL
MCH RBC QN AUTO: 28.8 PG (ref 26.5–33)
MCHC RBC AUTO-ENTMCNC: 34.1 G/DL (ref 31.5–36.5)
MCV RBC AUTO: 85 FL (ref 78–100)
NONHDLC SERPL-MCNC: 165 MG/DL
PLATELET # BLD AUTO: 300 10E9/L (ref 150–450)
POTASSIUM SERPL-SCNC: 3.1 MMOL/L (ref 3.4–5.3)
PROT SERPL-MCNC: 8 G/DL (ref 6.8–8.8)
RBC # BLD AUTO: 5.27 10E12/L (ref 3.8–5.2)
SODIUM SERPL-SCNC: 139 MMOL/L (ref 133–144)
TRIGL SERPL-MCNC: 149 MG/DL
WBC # BLD AUTO: 6.9 10E9/L (ref 4–11)

## 2018-11-02 PROCEDURE — 99213 OFFICE O/P EST LOW 20 MIN: CPT | Performed by: PHYSICIAN ASSISTANT

## 2018-11-02 PROCEDURE — 36415 COLL VENOUS BLD VENIPUNCTURE: CPT | Performed by: PHYSICIAN ASSISTANT

## 2018-11-02 PROCEDURE — 80061 LIPID PANEL: CPT | Performed by: PHYSICIAN ASSISTANT

## 2018-11-02 PROCEDURE — 85027 COMPLETE CBC AUTOMATED: CPT | Performed by: PHYSICIAN ASSISTANT

## 2018-11-02 PROCEDURE — 80053 COMPREHEN METABOLIC PANEL: CPT | Performed by: PHYSICIAN ASSISTANT

## 2018-11-02 RX ORDER — PHENTERMINE HYDROCHLORIDE 30 MG/1
30 CAPSULE ORAL EVERY MORNING
Qty: 30 CAPSULE | Refills: 5 | Status: SHIPPED | OUTPATIENT
Start: 2018-11-02 | End: 2019-04-12

## 2018-11-02 RX ORDER — METOPROLOL SUCCINATE 50 MG/1
50 TABLET, EXTENDED RELEASE ORAL DAILY
Qty: 90 TABLET | Refills: 3 | Status: SHIPPED | OUTPATIENT
Start: 2018-11-02 | End: 2019-04-26

## 2018-11-02 RX ORDER — CHLORTHALIDONE 25 MG/1
TABLET ORAL
Qty: 90 TABLET | Refills: 3 | Status: SHIPPED | OUTPATIENT
Start: 2018-11-02 | End: 2019-04-26

## 2018-11-02 ASSESSMENT — PAIN SCALES - GENERAL: PAINLEVEL: NO PAIN (0)

## 2018-11-02 ASSESSMENT — ANXIETY QUESTIONNAIRES
7. FEELING AFRAID AS IF SOMETHING AWFUL MIGHT HAPPEN: NOT AT ALL
6. BECOMING EASILY ANNOYED OR IRRITABLE: NOT AT ALL
2. NOT BEING ABLE TO STOP OR CONTROL WORRYING: NOT AT ALL
1. FEELING NERVOUS, ANXIOUS, OR ON EDGE: NOT AT ALL
5. BEING SO RESTLESS THAT IT IS HARD TO SIT STILL: NOT AT ALL
3. WORRYING TOO MUCH ABOUT DIFFERENT THINGS: NOT AT ALL
IF YOU CHECKED OFF ANY PROBLEMS ON THIS QUESTIONNAIRE, HOW DIFFICULT HAVE THESE PROBLEMS MADE IT FOR YOU TO DO YOUR WORK, TAKE CARE OF THINGS AT HOME, OR GET ALONG WITH OTHER PEOPLE: NOT DIFFICULT AT ALL
GAD7 TOTAL SCORE: 0

## 2018-11-02 ASSESSMENT — PATIENT HEALTH QUESTIONNAIRE - PHQ9
SUM OF ALL RESPONSES TO PHQ QUESTIONS 1-9: 2
5. POOR APPETITE OR OVEREATING: NOT AT ALL

## 2018-11-02 NOTE — NURSING NOTE
"Chief Complaint   Patient presents with     Hypertension       Initial /70  Pulse 75  Temp 96.8  F (36  C) (Tympanic)  Resp 18  Ht 5' 5\" (1.651 m)  Wt 194 lb (88 kg)  LMP 10/02/2018 (Approximate)  SpO2 98%  BMI 32.28 kg/m2 Estimated body mass index is 32.28 kg/(m^2) as calculated from the following:    Height as of this encounter: 5' 5\" (1.651 m).    Weight as of this encounter: 194 lb (88 kg).  Medication Reconciliation: complete    Dianne Garcia LPN  "

## 2018-11-02 NOTE — MR AVS SNAPSHOT
After Visit Summary   11/2/2018    Nadeen Robles    MRN: [id number]           Patient Information     Date Of Birth          1965        Visit Information        Provider Department      11/2/2018 8:30 AM Sunitha Lindsey PA [facility]        Today's Diagnoses     Benign essential hypertension    -  1    Morbid obesity due to excess calories (H)        Encounter for screening mammogram for breast cancer           Follow-ups after your visit        Future tests that were ordered for you today     Open Future Orders        Priority Expected Expires Ordered    MA Screen Bilateral w/Jarod Routine  11/2/2019 11/2/2018            Who to contact     If you have questions or need follow up information about today's clinic visit or your schedule please contact [facility] directly at [phone].  Normal or non-critical lab and imaging results will be communicated to you by MyChart, letter or phone within 4 business days after the clinic has received the results. If you do not hear from us within 7 days, please contact the clinic through StudyTubehart or phone. If you have a critical or abnormal lab result, we will notify you by phone as soon as possible.  Submit refill requests through Zenverge or call your pharmacy and they will forward the refill request to us. Please allow 3 business days for your refill to be completed.          Additional Information About Your Visit        MyChart Information     Zenverge gives you secure access to your electronic health record. If you see a primary care provider, you can also send messages to your care team and make appointments. If you have questions, please call your primary care clinic.  If you do not have a primary care provider, please call [phone] and they will assist you.        Care EveryWhere ID     This is your Care EveryWhere ID. This could be used by other organizations to access your [facility]  "records  [id number]        Your Vitals Were     Pulse Temperature Respirations Height Last Period Pulse Oximetry    75 96.8  F (36  C) (Tympanic) 18 5' 5\" (1.651 m) 10/02/2018 (Approximate) 98%    BMI (Body Mass Index)                   32.28 kg/m2            Blood Pressure from Last 3 Encounters:   11/02/18 120/70   02/28/18 104/68   01/30/18 120/70    Weight from Last 3 Encounters:   11/02/18 194 lb (88 kg)   02/28/18 181 lb (82.1 kg)   01/30/18 184 lb (83.5 kg)              We Performed the Following     CBC with platelets     Comprehensive metabolic panel     Lipid Profile          Where to get your medicines      These medications were sent to [id number] - [place] - [address]  [address] [place] [address]     Phone:  [phone]     chlorthalidone 25 MG tablet    metoprolol succinate 50 MG 24 hr tablet         Some of these will need a paper prescription and others can be bought over the counter.  Ask your nurse if you have questions.     Bring a paper prescription for each of these medications     phentermine 30 MG capsule          Primary Care Provider Office Phone # Fax #    RENETTA Stallworth [phone]       [facility] [place] [address]  [place] [address]        Equal Access to Services     DIANA BOYCE AH: Hadii aad ku hadasho Soomaali, waaxda luqadaha, qaybta kaalmada adeegyada, waxpenelope blue haymaureen yee. So [facility] [phone].    ATENCIÓN: Si habla español, tiene a johnson disposición servicios gratuitos de asistencia lingüística. Llame al [phone].    We comply with applicable federal civil rights laws and [place] laws. We do not discriminate on the basis of race, color, national origin, age, disability, sex, sexual orientation, or gender identity.            Thank you!     Thank you for choosing [facility]  for your care. Our goal is always to provide you with excellent care. Hearing back from our " patients is one way we can continue to improve our services. Please take a few minutes to complete the written survey that you may receive in the mail after your visit with us. Thank you!             Your Updated Medication List - Protect others around you: Learn how to safely use, store and throw away your medicines at www.disposemymeds.org.          This list is accurate as of 11/2/18  8:49 AM.  Always use your most recent med list.                   Brand Name Dispense Instructions for use Diagnosis    chlorthalidone 25 MG tablet    HYGROTON    90 tablet    TAKE 1 TABLET DAILY BY MOUTH    Benign essential hypertension       metoprolol succinate 50 MG 24 hr tablet    TOPROL-XL    90 tablet    Take 1 tablet (50 mg) by mouth daily    Benign essential hypertension       phentermine 30 MG capsule     30 capsule    Take 1 capsule (30 mg) by mouth every morning    Morbid obesity due to excess calories (H)       Potassium Chloride ER 20 MEQ Tbcr     30 tablet    TAKE 1 TABLET DAILY BY MOUTH    Hypokalemia

## 2018-11-03 ASSESSMENT — ANXIETY QUESTIONNAIRES: GAD7 TOTAL SCORE: 0

## 2018-11-12 NOTE — PROGRESS NOTES
SUBJECTIVE:   CC: Nadeen Robles is an 53 year old woman who presents for preventive health visit.     Healthy Habits:    Do you get at least three servings of calcium containing foods daily (dairy, green leafy vegetables, etc.)? yes    Amount of exercise or daily activities, outside of work: 5 day(s) per week gets a lot of walking in at work    Problems taking medications regularly No    Medication side effects: No    Have you had an eye exam in the past two years? no    Do you see a dentist twice per year? no    Do you have sleep apnea, excessive snoring or daytime drowsiness?no but has trouble sleeping          Today's PHQ-2 Score:   PHQ-2 ( 1999 Pfizer) 2/23/2015 7/29/2014   Q1: Little interest or pleasure in doing things 0 0   Q2: Feeling down, depressed or hopeless 0 0   PHQ-2 Score 0 0       Abuse: Current or Past(Physical, Sexual or Emotional)- No  Do you feel safe in your environment - Yes    Social History   Substance Use Topics     Smoking status: Never Smoker     Smokeless tobacco: Never Used      Comment: no 2nd hand smoke exposure     Alcohol use Yes      Comment: rarely     If you drink alcohol do you typically have >3 drinks per day or >7 drinks per week? No                     Reviewed orders with patient.  Reviewed health maintenance and updated orders accordingly - Yes      Patient over age 50, mutual decision to screen reflected in health maintenance.    Pertinent mammograms are reviewed under the imaging tab.  History of abnormal Pap smear: NO - age 30- 65 PAP every 3 years recommended  PAP / HPV Latest Ref Rng & Units 8/9/2017 7/18/2017 10/18/2011   PAP - ASC-US(A) ASC-US(A) NIL   HPV 16 DNA NEG:Negative Negative Negative -   HPV 18 DNA NEG:Negative Negative Negative -   OTHER HR HPV NEG:Negative Negative Negative -     Reviewed and updated as needed this visit by clinical staff         Reviewed and updated as needed this visit by Provider            ROS:  CONSTITUTIONAL: NEGATIVE for  fever, chills, change in weight  INTEGUMENTARY/SKIN: NEGATIVE for worrisome rashes, moles or lesions  EYES: NEGATIVE for vision changes or irritation  ENT: NEGATIVE for ear, mouth and throat problems  RESP: NEGATIVE for significant cough or SOB  BREAST: NEGATIVE for masses, tenderness or discharge  CV: NEGATIVE for chest pain, palpitations or peripheral edema  GI: NEGATIVE for nausea, abdominal pain, heartburn, or change in bowel habits  : NEGATIVE for unusual urinary or vaginal symptoms. No vaginal bleeding.  MUSCULOSKELETAL: NEGATIVE for significant arthralgias or myalgia  NEURO: NEGATIVE for weakness, dizziness or paresthesias  PSYCHIATRIC: NEGATIVE for changes in mood or affect     OBJECTIVE:   There were no vitals taken for this visit.  EXAM:  GENERAL: healthy, alert and no distress  EYES: Eyes grossly normal to inspection, PERRL and conjunctivae and sclerae normal  HENT: ear canals and TM's normal, nose and mouth without ulcers or lesions  NECK: no adenopathy, no asymmetry, masses, or scars and thyroid normal to palpation  RESP: lungs clear to auscultation - no rales, rhonchi or wheezes  BREAST: normal without masses, tenderness or nipple discharge and no palpable axillary masses or adenopathy  CV: regular rate and rhythm, normal S1 S2, no S3 or S4, no murmur, click or rub, no peripheral edema and peripheral pulses strong  ABDOMEN: soft, nontender, no hepatosplenomegaly, no masses and bowel sounds normal   (female): normal female external genitalia, normal urethral meatus, adnexa without masses   MS: no gross musculoskeletal defects noted, no edema  SKIN: no suspicious lesions or rashes  NEURO: Normal strength and tone, mentation intact and speech normal  PSYCH: mentation appears normal, affect normal/bright        ASSESSMENT/PLAN:   (Z01.419) Well woman exam  (primary encounter diagnosis)  Comment: Normal exam today    (E87.6) Hypokalemia  Patient restarted K+ 20 meq 2 weeks ago  Plan: potassium chloride  "ER (K-TAB) 20 MEQ ER tablet,        Basic metabolic panel                COUNSELING:   Reviewed preventive health counseling, as reflected in patient instructions       Regular exercise       Healthy diet/nutrition    BP Readings from Last 1 Encounters:   11/02/18 120/70     Estimated body mass index is 32.28 kg/(m^2) as calculated from the following:    Height as of 11/2/18: 5' 5\" (1.651 m).    Weight as of 11/2/18: 194 lb (88 kg).           reports that she has never smoked. She has never used smokeless tobacco.      Counseling Resources:  ATP IV Guidelines  Pooled Cohorts Equation Calculator  Breast Cancer Risk Calculator  FRAX Risk Assessment  ICSI Preventive Guidelines  Dietary Guidelines for Americans, 2010  USDA's MyPlate  ASA Prophylaxis  Lung CA Screening    RENETTA Marlow  [facility]  "

## 2018-11-20 ENCOUNTER — OFFICE VISIT (OUTPATIENT)
Dept: FAMILY MEDICINE | Facility: OTHER | Age: 53
End: 2018-11-20
Attending: PHYSICIAN ASSISTANT
Payer: COMMERCIAL

## 2018-11-20 ENCOUNTER — RADIANT APPOINTMENT (OUTPATIENT)
Dept: MAMMOGRAPHY | Facility: OTHER | Age: 53
End: 2018-11-20
Attending: PHYSICIAN ASSISTANT
Payer: COMMERCIAL

## 2018-11-20 VITALS
WEIGHT: 183 LBS | OXYGEN SATURATION: 99 % | DIASTOLIC BLOOD PRESSURE: 72 MMHG | HEIGHT: 65 IN | BODY MASS INDEX: 30.49 KG/M2 | TEMPERATURE: 96.3 F | HEART RATE: 59 BPM | SYSTOLIC BLOOD PRESSURE: 122 MMHG

## 2018-11-20 DIAGNOSIS — Z12.31 ENCOUNTER FOR SCREENING MAMMOGRAM FOR BREAST CANCER: ICD-10-CM

## 2018-11-20 DIAGNOSIS — Z01.419 WELL WOMAN EXAM: Primary | ICD-10-CM

## 2018-11-20 DIAGNOSIS — E87.6 HYPOKALEMIA: ICD-10-CM

## 2018-11-20 LAB
ANION GAP SERPL CALCULATED.3IONS-SCNC: 10 MMOL/L (ref 3–14)
BUN SERPL-MCNC: 17 MG/DL (ref 7–30)
CALCIUM SERPL-MCNC: 9.3 MG/DL (ref 8.5–10.1)
CHLORIDE SERPL-SCNC: 100 MMOL/L (ref 94–109)
CO2 SERPL-SCNC: 27 MMOL/L (ref 20–32)
CREAT SERPL-MCNC: 0.84 MG/DL (ref 0.52–1.04)
GFR SERPL CREATININE-BSD FRML MDRD: 71 ML/MIN/1.7M2
GLUCOSE SERPL-MCNC: 90 MG/DL (ref 70–99)
POTASSIUM SERPL-SCNC: 3.3 MMOL/L (ref 3.4–5.3)
SODIUM SERPL-SCNC: 137 MMOL/L (ref 133–144)

## 2018-11-20 PROCEDURE — 77067 SCR MAMMO BI INCL CAD: CPT | Mod: TC

## 2018-11-20 PROCEDURE — 80048 BASIC METABOLIC PNL TOTAL CA: CPT | Performed by: PHYSICIAN ASSISTANT

## 2018-11-20 PROCEDURE — 99396 PREV VISIT EST AGE 40-64: CPT | Performed by: PHYSICIAN ASSISTANT

## 2018-11-20 PROCEDURE — 77063 BREAST TOMOSYNTHESIS BI: CPT | Mod: TC

## 2018-11-20 PROCEDURE — 36415 COLL VENOUS BLD VENIPUNCTURE: CPT | Performed by: PHYSICIAN ASSISTANT

## 2018-11-20 RX ORDER — POTASSIUM CHLORIDE 1500 MG/1
TABLET, EXTENDED RELEASE ORAL
Qty: 90 TABLET | Refills: 3 | Status: SHIPPED | OUTPATIENT
Start: 2018-11-20 | End: 2019-05-02

## 2018-11-20 ASSESSMENT — ANXIETY QUESTIONNAIRES
6. BECOMING EASILY ANNOYED OR IRRITABLE: NOT AT ALL
4. TROUBLE RELAXING: NOT AT ALL
7. FEELING AFRAID AS IF SOMETHING AWFUL MIGHT HAPPEN: NOT AT ALL
1. FEELING NERVOUS, ANXIOUS, OR ON EDGE: NOT AT ALL
3. WORRYING TOO MUCH ABOUT DIFFERENT THINGS: NOT AT ALL
5. BEING SO RESTLESS THAT IT IS HARD TO SIT STILL: NOT AT ALL
GAD7 TOTAL SCORE: 0
2. NOT BEING ABLE TO STOP OR CONTROL WORRYING: NOT AT ALL

## 2018-11-20 ASSESSMENT — PAIN SCALES - GENERAL: PAINLEVEL: NO PAIN (0)

## 2018-11-20 ASSESSMENT — PATIENT HEALTH QUESTIONNAIRE - PHQ9: SUM OF ALL RESPONSES TO PHQ QUESTIONS 1-9: 1

## 2018-11-20 NOTE — NURSING NOTE
"Chief Complaint   Patient presents with     Physical       Initial /72  Pulse 59  Temp 96.3  F (35.7  C) (Tympanic)  Ht 5' 5.35\" (1.66 m)  Wt 183 lb (83 kg)  SpO2 99%  BMI 30.12 kg/m2 Estimated body mass index is 30.12 kg/(m^2) as calculated from the following:    Height as of this encounter: 5' 5.35\" (1.66 m).    Weight as of this encounter: 183 lb (83 kg).  Medication Reconciliation: complete    Kiarra Chadwick MA    "

## 2018-11-20 NOTE — MR AVS SNAPSHOT
After Visit Summary   11/20/2018    Nadeen Robles    MRN: [id number]           Patient Information     Date Of Birth          1965        Visit Information        Provider Department      11/20/2018 9:30 AM Sunitha Lindsey PA [facility]        Today's Diagnoses     Well woman exam    -  1    Hypokalemia          Care Instructions      Preventive Health Recommendations  Female Ages 50 - 64    Yearly exam: See your health care provider every year in order to  o Review health changes.   o Discuss preventive care.    o Review your medicines if your doctor has prescribed any.      Get a Pap test every three years (unless you have an abnormal result and your provider advises testing more often).    If you get Pap tests with HPV test, you only need to test every 5 years, unless you have an abnormal result.     You do not need a Pap test if your uterus was removed (hysterectomy) and you have not had cancer.    You should be tested each year for STDs (sexually transmitted diseases) if you're at risk.     Have a mammogram every 1 to 2 years.    Have a colonoscopy at age 50, or have a yearly FIT test (stool test). These exams screen for colon cancer.      Have a cholesterol test every 5 years, or more often if advised.    Have a diabetes test (fasting glucose) every three years. If you are at risk for diabetes, you should have this test more often.     If you are at risk for osteoporosis (brittle bone disease), think about having a bone density scan (DEXA).    Shots: Get a flu shot each year. Get a tetanus shot every 10 years.    Nutrition:     Eat at least 5 servings of fruits and vegetables each day.    Eat whole-grain bread, whole-wheat pasta and brown rice instead of white grains and rice.    Get adequate Calcium and Vitamin D.     Lifestyle    Exercise at least 150 minutes a week (30 minutes a day, 5 days a week). This will help you control your weight and prevent  disease.    Limit alcohol to one drink per day.    No smoking.     Wear sunscreen to prevent skin cancer.     See your dentist every six months for an exam and cleaning.    See your eye doctor every 1 to 2 years.            Follow-ups after your visit        Your next 10 appointments already scheduled     Nov 20, 2018 11:30 AM CST   (Arrive by 11:15 AM)   MA SCREENING BILATERAL W/ ZULEMA with HCMA1   [facility] [place] ([facility] [place] )    [address]  [place] [address]   [phone]           How do I prepare for my exam? (Food and drink instructions) No Food and Drink Restrictions.  How do I prepare for my exam? (Other instructions) Do not use any powder, lotion or deodorant under your arms or on your breast. If you do, we will ask you to remove it before your exam.  What should I wear: Wear comfortable, two-piece clothing.  How long does the exam take: Most scans will take 15 minutes.  What should I bring: Bring any previous mammograms from other facilities or have them mailed to the breast center.  Do I need a :  No  is needed.  What do I need to tell my doctor: If you have any allergies, tell your care team.  What should I do after the exam: No restrictions, You may resume normal activities.  What is this test: This test is an x-ray of the breast to look for breast disease. The breast is pressed between two plates to flatten and spread the tissue. An X-ray is taken of the breast from different angles.  Who should I call with questions: If you have any questions, please call the Imaging Department where you will have your exam. Directions, parking instructions, and other information is available on our website, [email or web address].  Other information about my exam Three-dimensional (3D) mammograms are available at [facility] locations in [facility], [facility], [facility], [place], and [place]. [facility] locations include [place] and [facility]  "Clinics and Surgery Center in [place].  Benefits of 3D mammograms include: * Improved rate of cancer detection * Decreases your chance of having to go back for more tests, which means fewer: * \"False-positive\" results (This means that there is an abnormal area but it isn't cancer.) * Invasive testing procedures, such as a biopsy or surgery * Can provide clearer images of the breast if you have dense breast tissue.  *3D mammography is an optional exam that anyone can have with a 2D mammogram. It doesn't replace or take the place of a 2D mammogram. 2D mammograms remain an effective screening test for all women.  Not all insurance companies cover the cost of a 3D mammogram. Check with your insurance.              Who to contact     If you have questions or need follow up information about today's clinic visit or your schedule please contact [facility] directly at [phone].  Normal or non-critical lab and imaging results will be communicated to you by Everyone Countshart, letter or phone within 4 business days after the clinic has received the results. If you do not hear from us within 7 days, please contact the clinic through Orange Glow Musict or phone. If you have a critical or abnormal lab result, we will notify you by phone as soon as possible.  Submit refill requests through VictorOps or call your pharmacy and they will forward the refill request to us. Please allow 3 business days for your refill to be completed.          Additional Information About Your Visit        Everyone CountsharM86 Security Information     VictorOps gives you secure access to your electronic health record. If you see a primary care provider, you can also send messages to your care team and make appointments. If you have questions, please call your primary care clinic.  If you do not have a primary care provider, please call [phone] and they will assist you.        Care EveryWhere ID     This is your Care EveryWhere ID. This could be used by other " "organizations to access your [facility] medical records  [id number]        Your Vitals Were     Pulse Temperature Height Pulse Oximetry BMI (Body Mass Index)       59 96.3  F (35.7  C) (Tympanic) 5' 5.35\" (1.66 m) 99% 30.12 kg/m2        Blood Pressure from Last 3 Encounters:   11/20/18 122/72   11/02/18 120/70   02/28/18 104/68    Weight from Last 3 Encounters:   11/20/18 183 lb (83 kg)   11/02/18 194 lb (88 kg)   02/28/18 181 lb (82.1 kg)              We Performed the Following     Basic metabolic panel          Where to get your medicines      These medications were sent to [id number] - [place], [place] - [address]  [address] [address]     Phone:  [phone]     potassium chloride ER 20 MEQ ER tablet          Primary Care Provider Office Phone # Fax #    RENETTA Stallworth [phone]       [facility] [place] [address]  [place] [address]        Equal Access to Services     [facility] Center[facility] : Hadii aad ku hadasho Soomaali, waaxda luqadaha, qaybta kaalmada adeegyada, luisa collins . So [facility] [phone].    ATENCIÓN: Si habla español, tiene a johnson disposición servicios gratuitos de asistencia lingüística. Jadon al [phone].    We comply with applicable federal civil rights laws and [place] laws. We do not discriminate on the basis of race, color, national origin, age, disability, sex, sexual orientation, or gender identity.            Thank you!     Thank you for choosing [facility]  for your care. Our goal is always to provide you with excellent care. Hearing back from our patients is one way we can continue to improve our services. Please take a few minutes to complete the written survey that you may receive in the mail after your visit with us. Thank you!             Your Updated Medication List - Protect others around you: Learn how to safely use, store and throw away your medicines at " www.disposemymeds.org.          This list is accurate as of 11/20/18 10:17 AM.  Always use your most recent med list.                   Brand Name Dispense Instructions for use Diagnosis    chlorthalidone 25 MG tablet    HYGROTON    90 tablet    TAKE 1 TABLET DAILY BY MOUTH    Benign essential hypertension       metoprolol succinate 50 MG 24 hr tablet    TOPROL-XL    90 tablet    Take 1 tablet (50 mg) by mouth daily    Benign essential hypertension       phentermine 30 MG capsule    ADIPEX-P    30 capsule    Take 1 capsule (30 mg) by mouth every morning    Morbid obesity due to excess calories (H)       potassium chloride ER 20 MEQ ER tablet    K-TAB    90 tablet    TAKE 1 TABLET DAILY BY MOUTH    Hypokalemia

## 2018-11-21 ASSESSMENT — ANXIETY QUESTIONNAIRES: GAD7 TOTAL SCORE: 0

## 2019-04-12 DIAGNOSIS — E66.01 MORBID OBESITY DUE TO EXCESS CALORIES (H): ICD-10-CM

## 2019-04-12 RX ORDER — PHENTERMINE HYDROCHLORIDE 30 MG/1
30 CAPSULE ORAL EVERY MORNING
Qty: 30 CAPSULE | Refills: 5 | Status: SHIPPED | OUTPATIENT
Start: 2019-04-12 | End: 2019-04-26

## 2019-04-12 NOTE — TELEPHONE ENCOUNTER
phentermine      Last Written Prescription Date:  11/2/19  Last Fill Quantity: 30,   # refills: 5  Last Office Visit: 11/20/18  Future Office visit:       Routing refill request to provider for review/approval because:  Drug not on the G, P or [facility] refill protocol or controlled substance

## 2019-04-25 NOTE — PROGRESS NOTES
SUBJECTIVE:   Nadeen Robles is a 53 year old female who presents to clinic today for the following   health issues: Pt works a janOnfidoial job. Wears a Fitbit. Typically can walk 30,000 steps daily. Now 2 week history of right knee pain worse with stairwalking.      Musculoskeletal problem/pain      Duration: about 2 weeks    Description  Location: right knee    Intensity:  moderate    Accompanying signs and symptoms: radiation of pain to mid thigh    History  Previous similar problem: no   Previous evaluation:  none    Precipitating or alleviating factors:  Trauma or overuse: no   Aggravating factors include: sitting, kneeling, standing, walking, climbing stairs, lifting and overuse. Pushing gas pedal in car.    Therapies tried and outcome: heat, ice and naproxen          Additional history: as documented    Reviewed  and updated as needed this visit by clinical staff         Reviewed and updated as needed this visit by Provider         Patient Active Problem List   Diagnosis     Obesity     CARDIOVASCULAR SCREENING; LDL GOAL LESS THAN 160     Hypertension goal BP (blood pressure) < 140/90     Hypokalemia     ACP (advance care planning)     Exposure     Papanicolaou smear of cervix with atypical squamous cells of undetermined significance (ASC-US)     Past Surgical History:   Procedure Laterality Date     cholecystectomy  1991     HC REMOVAL GALLBLADDER  1990       Social History     Tobacco Use     Smoking status: Never Smoker     Smokeless tobacco: Never Used     Tobacco comment: no 2nd hand smoke exposure   Substance Use Topics     Alcohol use: Yes     Comment: rarely     Family History   Problem Relation Age of Onset     Diabetes Mother      Other Cancer Mother 78        stage 4 lung cancer     Diabetes Father      Cerebrovascular Disease Father 75     Cardiovascular Father 75        heart attack         Current Outpatient Medications   Medication Sig Dispense Refill     chlorthalidone (HYGROTON) 25 MG  tablet TAKE 1 TABLET DAILY BY MOUTH 90 tablet 1     metoprolol succinate ER (TOPROL-XL) 50 MG 24 hr tablet Take 1 tablet (50 mg) by mouth daily 90 tablet 1     naproxen (NAPROSYN) 500 MG tablet Take 500 mg by mouth every 12 hours  0     phentermine (ADIPEX-P) 30 MG capsule Take 1 capsule (30 mg) by mouth every morning 30 capsule 5     potassium chloride ER (K-TAB) 20 MEQ ER tablet TAKE 1 TABLET DAILY BY MOUTH 90 tablet 3     Allergies   Allergen Reactions     Nkda [No Known Drug Allergies]        ROS:  Constitutional, HEENT, cardiovascular, pulmonary, gi and gu systems are negative, except as otherwise noted.    OBJECTIVE:                                                    /74   Pulse 75   Temp 97  F (36.1  C) (Tympanic)   Wt 86.2 kg (190 lb)   SpO2 99%   BMI 31.28 kg/m    Body mass index is 31.28 kg/m .  GENERAL APPEARANCE: healthy, alert and no distress  MS: No obvious right knee edema. Mild patellar ecchymosis. TTP medial joint line.  SKIN: no suspicious lesions or rashes  PSYCH: mentation appears normal and affect normal/bright         ASSESSMENT/PLAN:                                                    1. Acute pain of right knee  Xray today. Refer to ortho for consult. She has PT scheduled. Note written for off work April 24-May 1st. Will see how she is doing at that time.  - XR KNEE RIGHT 3 VIEWS (Clinic Performed); Future  - ORTHOPEDICS ADULT REFERRAL    2. Benign essential hypertension  RF  - Basic metabolic panel  - metoprolol succinate ER (TOPROL-XL) 50 MG 24 hr tablet; Take 1 tablet (50 mg) by mouth daily  Dispense: 90 tablet; Refill: 1  - chlorthalidone (HYGROTON) 25 MG tablet; TAKE 1 TABLET DAILY BY MOUTH  Dispense: 90 tablet; Refill: 1    3. Morbid obesity due to excess calories (H)  RF  - phentermine (ADIPEX-P) 30 MG capsule; Take 1 capsule (30 mg) by mouth every morning  Dispense: 30 capsule; Refill: 5      Follow up if symptoms persist or worsen.      RENETTA Marlow  [facility]

## 2019-04-26 ENCOUNTER — ANCILLARY PROCEDURE (OUTPATIENT)
Dept: GENERAL RADIOLOGY | Facility: OTHER | Age: 54
End: 2019-04-26
Attending: PHYSICIAN ASSISTANT
Payer: COMMERCIAL

## 2019-04-26 ENCOUNTER — OFFICE VISIT (OUTPATIENT)
Dept: FAMILY MEDICINE | Facility: OTHER | Age: 54
End: 2019-04-26
Attending: PHYSICIAN ASSISTANT
Payer: COMMERCIAL

## 2019-04-26 VITALS
WEIGHT: 190 LBS | HEART RATE: 75 BPM | BODY MASS INDEX: 31.28 KG/M2 | OXYGEN SATURATION: 99 % | TEMPERATURE: 97 F | DIASTOLIC BLOOD PRESSURE: 74 MMHG | SYSTOLIC BLOOD PRESSURE: 120 MMHG

## 2019-04-26 DIAGNOSIS — I10 BENIGN ESSENTIAL HYPERTENSION: ICD-10-CM

## 2019-04-26 DIAGNOSIS — M25.561 ACUTE PAIN OF RIGHT KNEE: ICD-10-CM

## 2019-04-26 DIAGNOSIS — M25.561 ACUTE PAIN OF RIGHT KNEE: Primary | ICD-10-CM

## 2019-04-26 DIAGNOSIS — E66.01 MORBID OBESITY DUE TO EXCESS CALORIES (H): ICD-10-CM

## 2019-04-26 LAB
ANION GAP SERPL CALCULATED.3IONS-SCNC: 9 MMOL/L (ref 3–14)
BUN SERPL-MCNC: 22 MG/DL (ref 7–30)
CALCIUM SERPL-MCNC: 9.2 MG/DL (ref 8.5–10.1)
CHLORIDE SERPL-SCNC: 104 MMOL/L (ref 94–109)
CO2 SERPL-SCNC: 28 MMOL/L (ref 20–32)
CREAT SERPL-MCNC: 0.79 MG/DL (ref 0.52–1.04)
GFR SERPL CREATININE-BSD FRML MDRD: 86 ML/MIN/{1.73_M2}
GLUCOSE SERPL-MCNC: 93 MG/DL (ref 70–99)
POTASSIUM SERPL-SCNC: 3.6 MMOL/L (ref 3.4–5.3)
SODIUM SERPL-SCNC: 141 MMOL/L (ref 133–144)

## 2019-04-26 PROCEDURE — 73562 X-RAY EXAM OF KNEE 3: CPT | Mod: TC

## 2019-04-26 PROCEDURE — 36415 COLL VENOUS BLD VENIPUNCTURE: CPT | Performed by: PHYSICIAN ASSISTANT

## 2019-04-26 PROCEDURE — 99213 OFFICE O/P EST LOW 20 MIN: CPT | Performed by: PHYSICIAN ASSISTANT

## 2019-04-26 PROCEDURE — 80048 BASIC METABOLIC PNL TOTAL CA: CPT | Performed by: PHYSICIAN ASSISTANT

## 2019-04-26 RX ORDER — METOPROLOL SUCCINATE 50 MG/1
50 TABLET, EXTENDED RELEASE ORAL DAILY
Qty: 90 TABLET | Refills: 1 | Status: SHIPPED | OUTPATIENT
Start: 2019-04-26 | End: 2020-01-13

## 2019-04-26 RX ORDER — CHLORTHALIDONE 25 MG/1
TABLET ORAL
Qty: 90 TABLET | Refills: 1 | Status: SHIPPED | OUTPATIENT
Start: 2019-04-26 | End: 2019-12-13

## 2019-04-26 RX ORDER — POTASSIUM CHLORIDE 1500 MG/1
20 TABLET, EXTENDED RELEASE ORAL DAILY
Refills: 2 | COMMUNITY
Start: 2019-04-23 | End: 2019-04-26

## 2019-04-26 RX ORDER — NAPROXEN 500 MG/1
500 TABLET ORAL EVERY 12 HOURS
Refills: 0 | COMMUNITY
Start: 2019-04-24 | End: 2020-06-04

## 2019-04-26 RX ORDER — PHENTERMINE HYDROCHLORIDE 30 MG/1
30 CAPSULE ORAL EVERY MORNING
Qty: 30 CAPSULE | Refills: 5 | Status: SHIPPED | OUTPATIENT
Start: 2019-04-26 | End: 2019-10-16

## 2019-04-26 ASSESSMENT — PAIN SCALES - GENERAL: PAINLEVEL: MODERATE PAIN (5)

## 2019-04-26 NOTE — NURSING NOTE
"Chief Complaint   Patient presents with     Musculoskeletal Problem       Initial /74   Pulse 75   Temp 97  F (36.1  C) (Tympanic)   Wt 86.2 kg (190 lb)   SpO2 99%   BMI 31.28 kg/m   Estimated body mass index is 31.28 kg/m  as calculated from the following:    Height as of 11/20/18: 1.66 m (5' 5.35\").    Weight as of this encounter: 86.2 kg (190 lb).  Medication Reconciliation: complete    Kiarra Chadwick MA    "

## 2019-04-30 ENCOUNTER — TRANSFERRED RECORDS (OUTPATIENT)
Dept: HEALTH INFORMATION MANAGEMENT | Facility: CLINIC | Age: 54
End: 2019-04-30

## 2019-05-01 ENCOUNTER — TRANSFERRED RECORDS (OUTPATIENT)
Dept: HEALTH INFORMATION MANAGEMENT | Facility: CLINIC | Age: 54
End: 2019-05-01

## 2019-05-02 ENCOUNTER — OFFICE VISIT (OUTPATIENT)
Dept: FAMILY MEDICINE | Facility: OTHER | Age: 54
End: 2019-05-02
Attending: PHYSICIAN ASSISTANT
Payer: COMMERCIAL

## 2019-05-02 ENCOUNTER — TELEPHONE (OUTPATIENT)
Dept: FAMILY MEDICINE | Facility: OTHER | Age: 54
End: 2019-05-02

## 2019-05-02 VITALS
WEIGHT: 188 LBS | TEMPERATURE: 97.6 F | HEART RATE: 70 BPM | DIASTOLIC BLOOD PRESSURE: 72 MMHG | BODY MASS INDEX: 30.95 KG/M2 | SYSTOLIC BLOOD PRESSURE: 124 MMHG | OXYGEN SATURATION: 99 %

## 2019-05-02 DIAGNOSIS — Z01.818 PREOP GENERAL PHYSICAL EXAM: Primary | ICD-10-CM

## 2019-05-02 DIAGNOSIS — I10 BENIGN ESSENTIAL HYPERTENSION: ICD-10-CM

## 2019-05-02 LAB
ERYTHROCYTE [DISTWIDTH] IN BLOOD BY AUTOMATED COUNT: 12.9 % (ref 10–15)
HCT VFR BLD AUTO: 44.1 % (ref 35–47)
HGB BLD-MCNC: 15.4 G/DL (ref 11.7–15.7)
MCH RBC QN AUTO: 29.7 PG (ref 26.5–33)
MCHC RBC AUTO-ENTMCNC: 34.9 G/DL (ref 31.5–36.5)
MCV RBC AUTO: 85 FL (ref 78–100)
PLATELET # BLD AUTO: 313 10E9/L (ref 150–450)
RBC # BLD AUTO: 5.19 10E12/L (ref 3.8–5.2)
WBC # BLD AUTO: 7.7 10E9/L (ref 4–11)

## 2019-05-02 PROCEDURE — 36415 COLL VENOUS BLD VENIPUNCTURE: CPT | Performed by: PHYSICIAN ASSISTANT

## 2019-05-02 PROCEDURE — 99214 OFFICE O/P EST MOD 30 MIN: CPT | Performed by: PHYSICIAN ASSISTANT

## 2019-05-02 PROCEDURE — 93000 ELECTROCARDIOGRAM COMPLETE: CPT | Performed by: INTERNAL MEDICINE

## 2019-05-02 PROCEDURE — 85027 COMPLETE CBC AUTOMATED: CPT | Performed by: PHYSICIAN ASSISTANT

## 2019-05-02 ASSESSMENT — PAIN SCALES - GENERAL: PAINLEVEL: NO PAIN (0)

## 2019-05-02 NOTE — NURSING NOTE
"Chief Complaint   Patient presents with     Pre-Op Exam       Initial /72   Pulse 70   Temp 97.6  F (36.4  C) (Tympanic)   Wt 85.3 kg (188 lb)   SpO2 99%   BMI 30.95 kg/m   Estimated body mass index is 30.95 kg/m  as calculated from the following:    Height as of 11/20/18: 1.66 m (5' 5.35\").    Weight as of this encounter: 85.3 kg (188 lb).  Medication Reconciliation: complete    Kiarra Chadwick MA    "

## 2019-05-02 NOTE — TELEPHONE ENCOUNTER
Faxed preop Surgery [facility] fx# [phone]  fxd demo,med list, H& P 5/2/19 RENETTA Padilla,labs, EKG  Dx: procedure/ Right knee surgery    Corina Culver

## 2019-05-02 NOTE — PROGRESS NOTES
[address]  [facility] [address]  [phone]  Dept: [phone]    PRE-OP EVALUATION:  Today's date: 2019    Nadeen Robles (: 1965) presents for pre-operative evaluation assessment as requested by Dr. Springer.  She requires evaluation and anesthesia risk assessment prior to undergoing surgery/procedure for treatment of Knee Surgery .    Proposed Surgery/ Procedure: right Knee Surgery  Date of Surgery/ Procedure: 2019  Time of Surgery/ Procedure: [facility]  Hospital/Surgical Facility: [facility]  Fax number for surgical facility: unknown  Primary Physician: Sunitha Lindsey  Type of Anesthesia Anticipated: to be determined    Patient has a Health Care Directive or Living Will:  NO    1. NO - Do you have a history of heart attack, stroke, stent, bypass or surgery on an artery in the head, neck, heart or legs?  2. NO - Do you ever have any pain or discomfort in your chest?  3. NO - Do you have a history of  Heart Failure?  4. NO - Are you troubled by shortness of breath when: walking on the level, up a slight hill or at night?  5. NO - Do you currently have a cold, bronchitis or other respiratory infection?  6. NO - Do you have a cough, shortness of breath or wheezing?  7. NO - Do you sometimes get pains in the calves of your legs when you walk?  8. NO - Do you or anyone in your family have previous history of blood clots?  9. NO - Do you or does anyone in your family have a serious bleeding problem such as prolonged bleeding following surgeries or cuts?  10. NO - Have you ever had problems with anemia or been told to take iron pills?  11. NO - Have you had any abnormal blood loss such as black, tarry or bloody stools, or abnormal vaginal bleeding?  12. NO - Have you ever had a blood transfusion?  13. NO - Have you or any of your relatives ever had problems with anesthesia?  14. NO - Do you have sleep apnea, excessive snoring or daytime  drowsiness?  15. NO - Do you have any prosthetic heart valves?  16. NO - Do you have prosthetic joints?  17. NO - Is there any chance that you may be pregnant?      HPI:     HPI related to upcoming procedure:  Patient developed right knee pain on about April 12th. It has worsened over this time. MRI shows medial meniscus tear.      See problem list for active medical problems.  Problems all longstanding and stable, except as noted/documented.  See ROS for pertinent symptoms related to these conditions.                                                                                                                                                          .    MEDICAL HISTORY:     Patient Active Problem List    Diagnosis Date Noted     Papanicolaou smear of cervix with atypical squamous cells of undetermined significance (ASC-US) 08/16/2017     Priority: Medium     Exposure 08/09/2017     Priority: Medium     ACP (advance care planning) 01/06/2017     Priority: Medium     Advance Care Planning 1/6/2017: ACP Review of Chart / Resources Provided:  Reviewed chart for advance care plan.  Nadeen GARCIA Morgan has been provided information and resources to begin or update their advance care plan.  Added by Venus Mckenna             Hypokalemia 10/16/2011     Priority: Medium     Hypertension goal BP (blood pressure) < 140/90 01/04/2011     Priority: Medium     CARDIOVASCULAR SCREENING; LDL GOAL LESS THAN 160 10/31/2010     Priority: Medium     Obesity 03/26/2010     Priority: Medium     Problem list name updated by automated process. Provider to review        Past Medical History:   Diagnosis Date     Autoimmune hepatitis (H)      HTN (hypertension)      Migraine      Obesity      Past Surgical History:   Procedure Laterality Date     cholecystectomy  1991     HC REMOVAL GALLBLADDER  1990     Current Outpatient Medications   Medication Sig Dispense Refill     chlorthalidone (HYGROTON) 25 MG tablet TAKE 1 TABLET DAILY BY  MOUTH 90 tablet 1     metoprolol succinate ER (TOPROL-XL) 50 MG 24 hr tablet Take 1 tablet (50 mg) by mouth daily 90 tablet 1     naproxen (NAPROSYN) 500 MG tablet Take 500 mg by mouth every 12 hours  0     phentermine (ADIPEX-P) 30 MG capsule Take 1 capsule (30 mg) by mouth every morning 30 capsule 5     potassium chloride ER (K-TAB) 20 MEQ ER tablet TAKE 1 TABLET DAILY BY MOUTH 90 tablet 3     OTC products: None, except as noted above    Allergies   Allergen Reactions     Nkda [No Known Drug Allergies]       Latex Allergy: NO    Social History     Tobacco Use     Smoking status: Never Smoker     Smokeless tobacco: Never Used     Tobacco comment: no 2nd hand smoke exposure   Substance Use Topics     Alcohol use: Yes     Comment: rarely     History   Drug Use No       REVIEW OF SYSTEMS:   CONSTITUTIONAL: NEGATIVE for fever, chills, change in weight  INTEGUMENTARY/SKIN: NEGATIVE for worrisome rashes, moles or lesions  EYES: NEGATIVE for vision changes or irritation  ENT/MOUTH: NEGATIVE for ear, mouth and throat problems  RESP: NEGATIVE for significant cough or SOB  CV: NEGATIVE for chest pain, palpitations or peripheral edema  GI: NEGATIVE for nausea, abdominal pain, heartburn, or change in bowel habits  : NEGATIVE for frequency, dysuria, or hematuria  NEURO: NEGATIVE for weakness, dizziness or paresthesias  ENDOCRINE: NEGATIVE for temperature intolerance, skin/hair changes  HEME: NEGATIVE for bleeding problems  PSYCHIATRIC: NEGATIVE for changes in mood or affect    EXAM:   There were no vitals taken for this visit.    GENERAL APPEARANCE: healthy, alert and no distress     EYES: EOMI, PERRL     HENT: ear canals and TM's normal and nose and mouth without ulcers or lesions     NECK: no adenopathy, no asymmetry, masses, or scars and thyroid normal to palpation     RESP: lungs clear to auscultation - no rales, rhonchi or wheezes     CV: regular rates and rhythm, normal S1 S2, no S3 or S4 and no murmur, click or rub      ABDOMEN:  soft, nontender, no HSM or masses and bowel sounds normal     SKIN: no suspicious lesions or rashes     NEURO: Normal strength and tone, sensory exam grossly normal, mentation intact and speech normal     PSYCH: mentation appears normal. and affect normal/bright     LYMPHATICS: No cervical adenopathy    DIAGNOSTICS:   EKG - NSR  Results for orders placed or performed in visit on 05/02/19   CBC with platelets   Result Value Ref Range    WBC 7.7 4.0 - 11.0 10e9/L    RBC Count 5.19 3.8 - 5.2 10e12/L    Hemoglobin 15.4 11.7 - 15.7 g/dL    Hematocrit 44.1 35.0 - 47.0 %    MCV 85 78 - 100 fl    MCH 29.7 26.5 - 33.0 pg    MCHC 34.9 31.5 - 36.5 g/dL    RDW 12.9 10.0 - 15.0 %    Platelet Count 313 150 - 450 10e9/L         Recent Labs   Lab Test 04/26/19  0949 11/20/18  1016 11/02/18  0856  02/28/18  0903  10/02/15  1121   HGB  --   --  15.2  --  15.1   < > 13.6   PLT  --   --  300  --  309   < > 260    137 139   < > 138   < > 139   POTASSIUM 3.6 3.3* 3.1*   < > 2.6*   < > 4.2   CR 0.79 0.84 0.83   < > 0.70   < > 0.74   A1C  --   --   --   --   --   --  5.9    < > = values in this interval not displayed.        IMPRESSION:   (Z01.818) Preop general physical exam  (primary encounter diagnosis)      (I10) Benign essential hypertension  Comment: Stable        RECOMMENDATIONS:     Patient had a normal exam today. She is cleared for surgery 5-6-19, [facility], Dr. Springer.         Signed Electronically by: RENETTA Marlow    Copy of this evaluation report is provided to requesting physician.    [facility] Preop Guidelines    Revised Cardiac Risk Index

## 2019-05-02 NOTE — TELEPHONE ENCOUNTER
Pre-op should be faxed to Dr Sly Springer at [facility] out of [place]. Pt did not have a fax number.

## 2019-10-09 ENCOUNTER — TELEPHONE (OUTPATIENT)
Dept: FAMILY MEDICINE | Facility: OTHER | Age: 54
End: 2019-10-09

## 2019-10-09 NOTE — TELEPHONE ENCOUNTER
Patient called stating she burned her hand while [place] and is wondering if PCP would call in a script for silvadene cream.  School nurse told her its a 1st degree burn. Advised to ask pharmacist what she could buy OTC.

## 2019-10-14 DIAGNOSIS — E66.01 MORBID OBESITY DUE TO EXCESS CALORIES (H): ICD-10-CM

## 2019-10-14 NOTE — TELEPHONE ENCOUNTER
Phentermine      Last Written Prescription Date:  04/26/19  Last Fill Quantity: 30,   # refills: 5  Last Office Visit: 05/02/19  Future Office visit:       Routing refill request to provider for review/approval because:  Drug not on the [facility], P or [facility] refill protocol or controlled substance

## 2019-10-16 RX ORDER — PHENTERMINE HYDROCHLORIDE 30 MG/1
30 CAPSULE ORAL EVERY MORNING
Qty: 30 CAPSULE | Refills: 5 | Status: SHIPPED | OUTPATIENT
Start: 2019-10-16 | End: 2020-04-20

## 2019-11-25 ENCOUNTER — MEDICAL CORRESPONDENCE (OUTPATIENT)
Dept: HEALTH INFORMATION MANAGEMENT | Facility: CLINIC | Age: 54
End: 2019-11-25

## 2019-12-13 DIAGNOSIS — I10 BENIGN ESSENTIAL HYPERTENSION: ICD-10-CM

## 2019-12-13 NOTE — TELEPHONE ENCOUNTER
Alivia       Last Written Prescription Date:  4/26/019  Last Fill Quantity: 90,   # refills: 01  Last Office Visit: 5/02/2019  Future Office visit:

## 2019-12-17 RX ORDER — CHLORTHALIDONE 25 MG/1
TABLET ORAL
Qty: 90 TABLET | Refills: 1 | Status: SHIPPED | OUTPATIENT
Start: 2019-12-17 | End: 2020-06-17

## 2019-12-19 ENCOUNTER — TELEPHONE (OUTPATIENT)
Dept: FAMILY MEDICINE | Facility: OTHER | Age: 54
End: 2019-12-19

## 2019-12-19 NOTE — TELEPHONE ENCOUNTER
Left message to call back to see if pt received Cologuard kit and if he will be able to complete it.

## 2020-01-10 DIAGNOSIS — I10 BENIGN ESSENTIAL HYPERTENSION: ICD-10-CM

## 2020-01-13 RX ORDER — METOPROLOL SUCCINATE 50 MG/1
50 TABLET, EXTENDED RELEASE ORAL DAILY
Qty: 90 TABLET | Refills: 0 | Status: SHIPPED | OUTPATIENT
Start: 2020-01-13 | End: 2020-04-20

## 2020-01-13 NOTE — TELEPHONE ENCOUNTER
metoprolol  Last Written Prescription Date: 4/26/19  Last Fill Quantity: 90 # of Refills: 1  Last Office Visit: 5/2/19

## 2020-02-23 ENCOUNTER — HEALTH MAINTENANCE LETTER (OUTPATIENT)
Age: 55
End: 2020-02-23

## 2020-03-03 ENCOUNTER — NURSE TRIAGE (OUTPATIENT)
Dept: FAMILY MEDICINE | Facility: OTHER | Age: 55
End: 2020-03-03

## 2020-03-03 ENCOUNTER — HOSPITAL ENCOUNTER (EMERGENCY)
Facility: HOSPITAL | Age: 55
Discharge: HOME | End: 2020-03-03
Attending: NURSE PRACTITIONER | Admitting: NURSE PRACTITIONER
Payer: COMMERCIAL

## 2020-03-03 VITALS
SYSTOLIC BLOOD PRESSURE: 124 MMHG | WEIGHT: 199.52 LBS | RESPIRATION RATE: 16 BRPM | OXYGEN SATURATION: 97 % | DIASTOLIC BLOOD PRESSURE: 71 MMHG | TEMPERATURE: 97.5 F | BODY MASS INDEX: 32.84 KG/M2

## 2020-03-03 DIAGNOSIS — J20.9 ACUTE BRONCHITIS, UNSPECIFIED ORGANISM: ICD-10-CM

## 2020-03-03 DIAGNOSIS — J20.9 ACUTE BRONCHITIS: ICD-10-CM

## 2020-03-03 PROCEDURE — G0463 HOSPITAL OUTPT CLINIC VISIT: HCPCS

## 2020-03-03 PROCEDURE — 99213 OFFICE O/P EST LOW 20 MIN: CPT | Mod: Z6 | Performed by: NURSE PRACTITIONER

## 2020-03-03 RX ORDER — BENZONATATE 100 MG/1
100 CAPSULE ORAL 3 TIMES DAILY PRN
Qty: 12 CAPSULE | Refills: 0 | Status: SHIPPED | OUTPATIENT
Start: 2020-03-03 | End: 2020-06-04

## 2020-03-03 RX ORDER — AZITHROMYCIN 250 MG/1
TABLET, FILM COATED ORAL
Qty: 6 TABLET | Refills: 0 | Status: SHIPPED | OUTPATIENT
Start: 2020-03-03 | End: 2020-06-04

## 2020-03-03 ASSESSMENT — ENCOUNTER SYMPTOMS
GASTROINTESTINAL NEGATIVE: 1
MYALGIAS: 1
RHINORRHEA: 1
SORE THROAT: 1
CHILLS: 0
COUGH: 1
SHORTNESS OF BREATH: 1
SINUS PRESSURE: 0
HEADACHES: 1
LIGHT-HEADEDNESS: 1
FEVER: 0
ACTIVITY CHANGE: 1
SINUS PAIN: 0
TROUBLE SWALLOWING: 0
EYES NEGATIVE: 1
DIZZINESS: 0

## 2020-03-03 NOTE — ED PROVIDER NOTES
History     Chief Complaint   Patient presents with     Cough     HPI  Nadeen Robles is a 54 year old female who symptoms of ear pain, runny nose, sore throat, shortness of breath and light headedness with cough, body aches, and headaches for the last four days.she took ibuprofen last @ 0400 and Dayquil @ 0630 with minimal relief. History of bronchitis. She is a non-smoker. Denies fevers, chills, nausea, vomiting, and diarrhea.    Allergies:  Allergies   Allergen Reactions     Nkda [No Known Drug Allergies]        Problem List:    Patient Active Problem List    Diagnosis Date Noted     Papanicolaou smear of cervix with atypical squamous cells of undetermined significance (ASC-US) 08/16/2017     Priority: Medium     Exposure 08/09/2017     Priority: Medium     ACP (advance care planning) 01/06/2017     Priority: Medium     Advance Care Planning 1/6/2017: ACP Review of Chart / Resources Provided:  Reviewed chart for advance care plan.  Nadeen Robles has been provided information and resources to begin or update their advance care plan.  Added by Venus Mckenna             Hypokalemia 10/16/2011     Priority: Medium     Hypertension goal BP (blood pressure) < 140/90 01/04/2011     Priority: Medium     CARDIOVASCULAR SCREENING; LDL GOAL LESS THAN 160 10/31/2010     Priority: Medium     Obesity 03/26/2010     Priority: Medium     Problem list name updated by automated process. Provider to review          Past Medical History:    Past Medical History:   Diagnosis Date     Autoimmune hepatitis (H)      HTN (hypertension)      Migraine      Obesity        Past Surgical History:    Past Surgical History:   Procedure Laterality Date     cholecystectomy  1991     HC REMOVAL GALLBLADDER  1990       Family History:    Family History   Problem Relation Age of Onset     Diabetes Mother      Other Cancer Mother 78        stage 4 lung cancer     Diabetes Father      Cerebrovascular Disease Father 75     Cardiovascular  Father 75        heart attack       Social History:  Marital Status:   [4]  Social History     Tobacco Use     Smoking status: Never Smoker     Smokeless tobacco: Never Used     Tobacco comment: no 2nd hand smoke exposure   Substance Use Topics     Alcohol use: Yes     Comment: rarely     Drug use: No        Medications:    azithromycin (ZITHROMAX) 250 MG tablet  benzonatate (TESSALON) 100 MG capsule  chlorthalidone (HYGROTON) 25 MG tablet  metoprolol succinate ER (TOPROL-XL) 50 MG 24 hr tablet  naproxen (NAPROSYN) 500 MG tablet  phentermine (ADIPEX-P) 30 MG capsule          Review of Systems   Constitutional: Positive for activity change. Negative for chills and fever.   HENT: Positive for ear pain, rhinorrhea and sore throat. Negative for sinus pressure, sinus pain and trouble swallowing.    Eyes: Negative.    Respiratory: Positive for cough and shortness of breath (with cough).    Gastrointestinal: Negative.    Musculoskeletal: Positive for myalgias.   Skin: Negative.    Neurological: Positive for light-headedness (with coughing) and headaches. Negative for dizziness.       Physical Exam   BP: 124/71  Heart Rate: 68  Temp: 97.5  F (36.4  C)  Resp: 16  Weight: 90.5 kg (199 lb 8.3 oz)  SpO2: 97 %      Physical Exam  Vitals signs and nursing note reviewed.   Constitutional:       General: She is in acute distress.      Appearance: She is overweight.   HENT:      Head: Normocephalic.      Right Ear: Tympanic membrane and ear canal normal.      Left Ear: Tympanic membrane and ear canal normal.      Nose: Nose normal.      Right Sinus: No maxillary sinus tenderness or frontal sinus tenderness.      Left Sinus: No maxillary sinus tenderness or frontal sinus tenderness.      Mouth/Throat:      Lips: Pink.      Mouth: Mucous membranes are moist.      Pharynx: Oropharynx is clear. Uvula midline.      Comments: Frequent dry cough  Eyes:      Conjunctiva/sclera: Conjunctivae normal.   Cardiovascular:      Rate and  Rhythm: Normal rate and regular rhythm.      Heart sounds: Normal heart sounds. No murmur.   Pulmonary:      Effort: Pulmonary effort is normal. No respiratory distress.      Breath sounds: Normal breath sounds. No wheezing.   Lymphadenopathy:      Cervical: No cervical adenopathy.   Skin:     General: Skin is warm and dry.      Capillary Refill: Capillary refill takes less than 2 seconds.   Neurological:      Mental Status: She is alert and oriented to person, place, and time.   Psychiatric:         Behavior: Behavior normal.         ED Course        Procedures             No results found for this or any previous visit (from the past 24 hour(s)).    Medications - No data to display    Assessments & Plan (with Medical Decision Making)     I have reviewed the nursing notes.    I have reviewed the findings, diagnosis, plan and need for follow up with the patient.  (J20.9) Acute bronchitis    Comment: 54 year old female who symptoms of ear pain, runny nose, sore throat, shortness of breath and light headedness with cough, body aches, and headaches for the last four days.she took ibuprofen last @ 0400 and Dayquil @ 0630 with minimal relief. History of bronchitis. She is a non-smoker. Denies fevers, chills, nausea, vomiting, and diarrhea.    Negative assessment except that she physically looks uncomfortable relating to her  frequent dry cough.    Plan: Azithromycin z-pack. Tessalon Perles for cough. Education provided on this/these medication and for bronchitis. Increase fluids. Complete all antibiotics even if feeling better.  Taking antibiotics with food may decrease the symptoms, of an upset stomach, that can occur when taking antibiotics. Antibiotics frequently cause diarrhea. Probiotics or yogurt may help prevent or decrease these symptoms.  Treat symptoms conservatively with acetaminophen and  ibuprofen (if applicable) for fevers, body aches, and headaches, guaifenesin and/or honey for cough. May use chest rubs  for sore throat and congestion, hot and cold liquids may help decrease sore throat and help you feel better. Increase fluids. You may utilize pseudoephedrine for congestion. Return to be reevaluated by ER/UC or your primary care provider if symptoms worsen, you develop breathing difficulties, or you do not improve in a reasonable time frame. It can take several days for a cough to resolve. It can take ten to fourteen days for upper respiratory symptoms to resolve.   These discharge instructions and medications were reviewed with her and understanding verbalized.    Discharge Medication List as of 3/3/2020  9:21 AM      START taking these medications    Details   azithromycin (ZITHROMAX) 250 MG tablet Take 2 tablets (500 mg) by mouth daily for 1 day, THEN 1 tablet (250 mg) daily for 4 days., Disp-6 tablet, R-0, E-Prescribe      benzonatate (TESSALON) 100 MG capsule Take 1 capsule (100 mg) by mouth 3 times daily as needed for cough May take one to two tablets three times daily as needed for cough, Disp-12 capsule, R-0, E-Prescribe             Final diagnoses:   Acute bronchitis       3/3/2020   [facility]       Vaishali Simmons, CNP  03/04/20 1300

## 2020-03-03 NOTE — ED AVS SNAPSHOT
[facility]  [address] [address]  Phone:  [phone]                                    Nadeen Robles   MRN: [id number]    Department:  [facility]   Date of Visit:  3/3/2020           After Visit Summary Signature Page    I have received my discharge instructions, and my questions have been answered. I have discussed any challenges I see with this plan with the nurse or doctor.    ..........................................................................................................................................  Patient/Patient Representative Signature      ..........................................................................................................................................  Patient Representative Print Name and Relationship to Patient    ..................................................               ................................................  Date                                   Time    ..........................................................................................................................................  Reviewed by Signature/Title    ...................................................              ..............................................  Date                                               Time          22EPIC Rev 08/18

## 2020-03-03 NOTE — TELEPHONE ENCOUNTER
Patient is requesting appt. She thinks she has bronchitis.She is unsure if she has a fever. She has some wheezing but not at this time. Patient started to go into coughing spell while checking for availability. Coughing continued. Advised UC and she did agree with the plan.  She should be seen within 4  Hours per protocol. Due to cough she will go to  at this time.  Reason for Disposition    Wheezing is present    Additional Information    Negative: Severe difficulty breathing (e.g., struggling for each breath, speaks in single words)    Negative: Bluish (or gray) lips or face now    Negative: [1] Rapid onset of cough AND [2] has hives    Negative: Coughing started suddenly after medicine, an allergic food or bee sting    Negative: [1] Difficulty breathing AND [2] exposure to flames, smoke, or fumes    Negative: [1] Stridor AND [2] difficulty breathing    Negative: Sounds like a life-threatening emergency to the triager    Negative: Cough lasts > 3 weeks    Negative: [1] Previous asthma attacks AND [2] this feels like asthma attack    Negative: Chest pain is main symptom    Negative: Choked on object of food that could be caught in the throat    Negative: Wet (productive) cough (i.e., white-yellow, yellow, green, or yvonne colored sputum)    Negative: Chest pain  (Exception: MILD central chest pain, present only when coughing)    Negative: Difficulty breathing    Negative: Patient sounds very sick or weak to the triager    Negative: [1] Coughed up blood AND [2] > 1 tablespoon (15 ml) (Exception: blood-tinged sputum)    Negative: [1] Fever > 100.0 F (37.8 C) AND [2] diabetes mellitus or weak immune system (e.g., HIV positive, cancer chemo, splenectomy, chronic steroids)    Negative: [1] Fever > 100.0 F (37.8 C) AND [2] bedridden (e.g., nursing home patient, CVA, chronic illness, recovering from surgery)    Negative: [1] Fever > 101 F (38.3 C) AND [2] age > 60    Negative: Fever > 103 F (39.4 C)    Protocols used:  COUGH - ACUTE NON-PRODUCTIVE-A-AH

## 2020-03-03 NOTE — DISCHARGE INSTRUCTIONS
Increase oral intake, cool mist vaporizer as needed, rest, avoid sharing utensils, practice good hand washing techniques, cover mouth when you cough and sneeze. Throw toothbursh away 24 hours after starting antibiotics.  Over the counter medications such as ibuprofen and/or acetaminophen for fever and generalized aches and pains. Ibuprofen 400 to 800 mg (2 - 4 tabs of over the counter med) every six to eight hours as needed;not to exceed maximum amount of 3200 mg in 24 hours.Tylenol 650 to 1000 mg every four to six hours as needed (not to exceed more than 4000 mg in a 24 hour period). May use interchangeably. Robitussin (guaifenesin) for cough. Chest rubs such as Onesimo's or Mentholatum may help reduce sore throat symptoms.  Chloraseptic spray for sore throat or menthol lozenges may be helpful for sore throat. Be reevaluated if symptoms persist longer than 10 - 14 days or worsen and if there is no improvement in 72 hours or worsening of symptoms.  Increase fluids. Complete all antibiotics even if feeling better. Taking antibiotics with food may decrease the stomach upset that can occur when taking antibiotics. Antibiotics frequently cause diarrhea. Probiotics or yogurt may help prevent or decrease these symptoms.    Fluids, herbs, and foods for sore throat relief -- Adjusting the temperature and texture of foods and beverages may provide local relief of sore throat pain. While data showing benefit are quite limited, these approaches are intuitive. We typically advise these measures since they are likely to be safe with minimal adverse effect, and patients often describe relief of symptoms.  We suggest hydration with frozen (eg, ice or popsicles) or heated liquids (eg, teas, soups), rather than room temperature or refrigerated fluids in patient with significant sore throat pain. Very cold foods can have a numbing-like effect that temporarily reduces or alleviates the pain of swallowing. Ice cubes or frozen popsicles  facilitate hydration; ice cream and frozen yogurt provide caloric intake.  Warm fluids and foods, including teas, soups, and soft non-irritating foods, may be better tolerated by patients with throat pain than irritating foods (eg, rough-textured or spicy foods) or fluids at room temperatures. Foods that coat the throat, including honey and hard candies, can facilitate intake of calories while temporarily relieving throat pain.

## 2020-04-18 DIAGNOSIS — I10 BENIGN ESSENTIAL HYPERTENSION: ICD-10-CM

## 2020-04-18 DIAGNOSIS — E66.01 MORBID OBESITY DUE TO EXCESS CALORIES (H): ICD-10-CM

## 2020-04-20 RX ORDER — METOPROLOL SUCCINATE 50 MG/1
50 TABLET, EXTENDED RELEASE ORAL DAILY
Qty: 90 TABLET | Refills: 0 | Status: SHIPPED | OUTPATIENT
Start: 2020-04-20 | End: 2020-08-06

## 2020-04-20 RX ORDER — PHENTERMINE HYDROCHLORIDE 30 MG/1
CAPSULE ORAL
Qty: 30 CAPSULE | Refills: 0 | Status: SHIPPED | OUTPATIENT
Start: 2020-04-20 | End: 2020-05-22

## 2020-04-20 NOTE — TELEPHONE ENCOUNTER
phentermine      Last Written Prescription Date:  10/16/2019  Last Fill Quantity: 30,   # refills: 5  Last Office Visit: 5/2/2019  Future Office visit:       Routing refill request to provider for review/approval because:  Drug not on the [facility], [facility] or [facility] refill protocol or controlled substance

## 2020-05-22 DIAGNOSIS — E66.01 MORBID OBESITY DUE TO EXCESS CALORIES (H): ICD-10-CM

## 2020-05-22 RX ORDER — PHENTERMINE HYDROCHLORIDE 30 MG/1
CAPSULE ORAL
Qty: 30 CAPSULE | Refills: 0 | Status: SHIPPED | OUTPATIENT
Start: 2020-05-22 | End: 2020-06-04

## 2020-05-22 NOTE — TELEPHONE ENCOUNTER
Phentermine      Last Written Prescription Date:  04/20/20  Last Fill Quantity: 30,   # refills: 0  Last Office Visit: 05/02/19  Future Office visit:

## 2020-06-04 ENCOUNTER — VIRTUAL VISIT (OUTPATIENT)
Dept: FAMILY MEDICINE | Facility: OTHER | Age: 55
End: 2020-06-04
Attending: PHYSICIAN ASSISTANT
Payer: COMMERCIAL

## 2020-06-04 DIAGNOSIS — E66.01 MORBID OBESITY DUE TO EXCESS CALORIES (H): ICD-10-CM

## 2020-06-04 DIAGNOSIS — G47.00 INSOMNIA, UNSPECIFIED TYPE: Primary | ICD-10-CM

## 2020-06-04 PROCEDURE — 99213 OFFICE O/P EST LOW 20 MIN: CPT | Mod: 95 | Performed by: PHYSICIAN ASSISTANT

## 2020-06-04 RX ORDER — PHENTERMINE HYDROCHLORIDE 30 MG/1
CAPSULE ORAL
Qty: 30 CAPSULE | Refills: 5 | Status: SHIPPED | OUTPATIENT
Start: 2020-06-04 | End: 2020-12-16

## 2020-06-04 NOTE — PROGRESS NOTES
"Nadeen Robles is a 54 year old female who is being evaluated via a billable telephone visit.      The patient has been notified of following:     \"This telephone visit will be conducted via a call between you and your physician/provider. We have found that certain health care needs can be provided without the need for a physical exam.  This service lets us provide the care you need with a short phone conversation.  If a prescription is necessary we can send it directly to your pharmacy.  If lab work is needed we can place an order for that and you can then stop by our lab to have the test done at a later time.    Telephone visits are billed at different rates depending on your insurance coverage. During this emergency period, for some insurers they may be billed the same as an in-person visit.  Please reach out to your insurance provider with any questions.    If during the course of the call the physician/provider feels a telephone visit is not appropriate, you will not be charged for this service.\"    Patient has given verbal consent for Telephone visit?  Yes    What phone number would you like to be contacted at? 1    How would you like to obtain your AVS? Vj    Subjective     Nadeen Robles is a 54 year old female who presents via phone visit today for the following health issues:    HPI      Medication Followup of phentermine    Taking Medication as prescribed: yes    Side Effects:  None    Medication Helping Symptoms:  Yes    Patient is having trouble sleeping. Wondering if there is something she can take to help sleep. Waking up about every hour.        Patient Active Problem List   Diagnosis     Obesity     CARDIOVASCULAR SCREENING; LDL GOAL LESS THAN 160     Hypertension goal BP (blood pressure) < 140/90     Hypokalemia     ACP (advance care planning)     Exposure     Papanicolaou smear of cervix with atypical squamous cells of undetermined significance (ASC-US)     Past Surgical History: "   Procedure Laterality Date     cholecystectomy  1991     HC REMOVAL GALLBLADDER  1990       Social History     Tobacco Use     Smoking status: Never Smoker     Smokeless tobacco: Never Used     Tobacco comment: no 2nd hand smoke exposure   Substance Use Topics     Alcohol use: Yes     Comment: rarely     Family History   Problem Relation Age of Onset     Diabetes Mother      Other Cancer Mother 78        stage 4 lung cancer     Diabetes Father      Cerebrovascular Disease Father 75     Cardiovascular Father 75        heart attack         Current Outpatient Medications   Medication Sig Dispense Refill     chlorthalidone (HYGROTON) 25 MG tablet TAKE 1 TABLET DAILY BY MOUTH 90 tablet 1     metoprolol succinate ER (TOPROL-XL) 50 MG 24 hr tablet TAKE 1 TABLET (50 MG) BY MOUTH DAILY PLEASE SCHEDULE AN OFFICE VISIT. 90 tablet 0     phentermine (ADIPEX-P) 30 MG capsule TAKE 1 CAPSULE BY MOUTH EVERY MORNING 30 capsule 5     Allergies   Allergen Reactions     Nkda [No Known Drug Allergies]        Reviewed and updated as needed this visit by Provider         Review of Systems   Constitutional, HEENT, cardiovascular, pulmonary, gi and gu systems are negative, except as otherwise noted.       Objective   Patient has telephone visit today due to COVID pandemic. F/u obesity and needs Phentermine RF. Her weight has remained stable the last few months. No weight loss but she thinks this is due to being laid off work. 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 Some mixed sleep disturbance for several months. We reviewed sleep hygiene.    Assessment/Plan:  (G47.00) Insomnia, unspecified type  (primary encounter diagnosis)  Comment:Trial of Melatonin up to 10 mg HS. If no improvement consider Ambien.      (E66.01) Morbid obesity due to excess calories (H)  Comment: 6 month RF  Plan: phentermine (ADIPEX-P) 30 MG capsule                No follow-ups on file.      Phone call duration:  15 minutes    RENETTA Marlow

## 2020-06-04 NOTE — NURSING NOTE
"Chief Complaint   Patient presents with     Weight Problem       Initial There were no vitals taken for this visit. Estimated body mass index is 32.84 kg/m  as calculated from the following:    Height as of 11/20/18: 1.66 m (5' 5.35\").    Weight as of 3/3/20: 90.5 kg (199 lb 8.3 oz).  Medication Reconciliation: complete  Kiarra Chadwick MA  "

## 2020-06-17 DIAGNOSIS — I10 BENIGN ESSENTIAL HYPERTENSION: ICD-10-CM

## 2020-06-17 RX ORDER — CHLORTHALIDONE 25 MG/1
TABLET ORAL
Qty: 90 TABLET | Refills: 0 | Status: SHIPPED | OUTPATIENT
Start: 2020-06-17 | End: 2020-10-15

## 2020-06-17 NOTE — TELEPHONE ENCOUNTER
maribeth      Last Written Prescription Date:  12.17.19  Last Fill Quantity: 90,   # refills: 1  Last Office Visit: 6.4.2020

## 2020-06-17 NOTE — TELEPHONE ENCOUNTER
Failed protocol due to    Normal serum creatinine on file in past 12 months Protocol Details    Normal serum potassium on file in past 12 months     Normal serum sodium on file in past 12 months      Creatinine   Date Value Ref Range Status   04/26/2019 0.79 0.52 - 1.04 mg/dL Final      Potassium   Date Value Ref Range Status   04/26/2019 3.6 3.4 - 5.3 mmol/L Final     Sodium   Date Value Ref Range Status   04/26/2019 141 133 - 144 mmol/L Final

## 2020-06-17 NOTE — LETTER
July 1, 2020        Dear Tracy,      I am writing to you today as attempts to contact via telephone have not been achieved.    Your provider states that you are due for an appointment relating to Lab Orders: CMP, CBC, Lipids/Clusiau.    Please call the scheduling line at [phone] to set up a Lab Only appointment at our [place] location as soon as you are available.      Thank you for your time and cooperation in allowing us to continue to care for your health needs.    Sincerely,        [facility] Coordinator

## 2020-06-17 NOTE — TELEPHONE ENCOUNTER
I filled for 3 months. She is due for labs and an appointment with Sunitha. Please schedule her a telephone visit with Sunitha.

## 2020-06-18 NOTE — TELEPHONE ENCOUNTER
Informed patient of 3 month refill. She did have a telephone visit with Sunitha on 6- . Ok to just enter labs with a future lab appt?

## 2020-07-01 NOTE — TELEPHONE ENCOUNTER
LVM to CB to schedule Lab Only and mailed / My Chart notification letter on appointment needed. 7/1/20 Dragan

## 2020-07-21 DIAGNOSIS — I10 BENIGN ESSENTIAL HYPERTENSION: Primary | ICD-10-CM

## 2020-07-22 NOTE — TELEPHONE ENCOUNTER
Orders pending for medication.    Daria montoya, can you please schedule patient to come in for an office visit for htn.

## 2020-07-30 RX ORDER — METOPROLOL SUCCINATE 50 MG/1
50 TABLET, EXTENDED RELEASE ORAL DAILY
Qty: 30 TABLET | Refills: 0 | OUTPATIENT
Start: 2020-07-30

## 2020-07-30 NOTE — TELEPHONE ENCOUNTER
metoprolol succinate ER (TOPROL-XL) 50 MG 24 hr tablet           Last Written Prescription Date:  4/20/20  Last Fill Quantity: 90,   # refills: 0  Last Office Visit: 6/4/2020  Future Office visit:

## 2020-08-05 DIAGNOSIS — I10 BENIGN ESSENTIAL HYPERTENSION: ICD-10-CM

## 2020-08-05 NOTE — TELEPHONE ENCOUNTER
Metoprolol      Last Written Prescription Date:  4.20.2020  Last Fill Quantity: 90,   # refills: 0  Last Office Visit: 06.04.2020

## 2020-08-06 ENCOUNTER — OFFICE VISIT (OUTPATIENT)
Dept: FAMILY MEDICINE | Facility: OTHER | Age: 55
End: 2020-08-06
Attending: PHYSICIAN ASSISTANT
Payer: COMMERCIAL

## 2020-08-06 VITALS
SYSTOLIC BLOOD PRESSURE: 124 MMHG | WEIGHT: 206 LBS | DIASTOLIC BLOOD PRESSURE: 72 MMHG | TEMPERATURE: 96.9 F | OXYGEN SATURATION: 98 % | HEART RATE: 66 BPM | BODY MASS INDEX: 33.91 KG/M2

## 2020-08-06 DIAGNOSIS — M65.30 TRIGGER FINGER, ACQUIRED: Primary | ICD-10-CM

## 2020-08-06 DIAGNOSIS — I10 HYPERTENSION GOAL BP (BLOOD PRESSURE) < 140/90: ICD-10-CM

## 2020-08-06 DIAGNOSIS — G25.81 RESTLESS LEGS SYNDROME (RLS): ICD-10-CM

## 2020-08-06 PROCEDURE — 99214 OFFICE O/P EST MOD 30 MIN: CPT | Performed by: PHYSICIAN ASSISTANT

## 2020-08-06 RX ORDER — METOPROLOL SUCCINATE 50 MG/1
50 TABLET, EXTENDED RELEASE ORAL DAILY
Qty: 90 TABLET | Refills: 0 | Status: SHIPPED | OUTPATIENT
Start: 2020-08-06 | End: 2020-11-11

## 2020-08-06 RX ORDER — ROPINIROLE 0.25 MG/1
TABLET, FILM COATED ORAL
Qty: 30 TABLET | Refills: 1 | Status: SHIPPED | OUTPATIENT
Start: 2020-08-06 | End: 2021-05-26

## 2020-08-06 ASSESSMENT — PATIENT HEALTH QUESTIONNAIRE - PHQ9: SUM OF ALL RESPONSES TO PHQ QUESTIONS 1-9: 6

## 2020-08-06 ASSESSMENT — ANXIETY QUESTIONNAIRES
5. BEING SO RESTLESS THAT IT IS HARD TO SIT STILL: NOT AT ALL
7. FEELING AFRAID AS IF SOMETHING AWFUL MIGHT HAPPEN: NOT AT ALL
6. BECOMING EASILY ANNOYED OR IRRITABLE: NOT AT ALL
GAD7 TOTAL SCORE: 0
3. WORRYING TOO MUCH ABOUT DIFFERENT THINGS: NOT AT ALL
4. TROUBLE RELAXING: NOT AT ALL
1. FEELING NERVOUS, ANXIOUS, OR ON EDGE: NOT AT ALL
2. NOT BEING ABLE TO STOP OR CONTROL WORRYING: NOT AT ALL

## 2020-08-06 ASSESSMENT — PAIN SCALES - GENERAL: PAINLEVEL: NO PAIN (0)

## 2020-08-06 NOTE — NURSING NOTE
"Chief Complaint   Patient presents with     Hypertension       Initial /72   Pulse 66   Temp 96.9  F (36.1  C) (Tympanic)   Wt 93.4 kg (206 lb)   SpO2 98%   BMI 33.91 kg/m   Estimated body mass index is 33.91 kg/m  as calculated from the following:    Height as of 11/20/18: 1.66 m (5' 5.35\").    Weight as of this encounter: 93.4 kg (206 lb).  Medication Reconciliation: complete  Kiarra Chadwick MA  "

## 2020-08-06 NOTE — PROGRESS NOTES
Subjective     Nadeen Robles is a 54 year old female who presents to clinic today for the following health issues: Another concern today is that patients legs feel restless at night. For past week it feels like something is crawling under her skin at night.    HPI       Hypertension Follow-up      Do you check your blood pressure regularly outside of the clinic? No     Are you following a low salt diet? Yes    Are your blood pressures ever more than 140 on the top number (systolic) OR more   than 90 on the bottom number (diastolic), for example 140/90? No      Finger issue      Duration: about a month ago getting worse for about a week    Description (location/character/radiation): right hand 4th finger    Intensity:  moderate    Accompanying signs and symptoms: locks when bent. Sometimes hurts when its stuck in place. Noticing its mostly when waking in the morning its locked.     History (similar episodes/previous evaluation): None    Precipitating or alleviating factors: None    Therapies tried and outcome: None       Patient Active Problem List   Diagnosis     Obesity     CARDIOVASCULAR SCREENING; LDL GOAL LESS THAN 160     Hypertension goal BP (blood pressure) < 140/90     Hypokalemia     ACP (advance care planning)     Exposure     Papanicolaou smear of cervix with atypical squamous cells of undetermined significance (ASC-US)     Past Surgical History:   Procedure Laterality Date     cholecystectomy  1991     HC REMOVAL GALLBLADDER  1990       Social History     Tobacco Use     Smoking status: Never Smoker     Smokeless tobacco: Never Used     Tobacco comment: no 2nd hand smoke exposure   Substance Use Topics     Alcohol use: Yes     Comment: rarely     Family History   Problem Relation Age of Onset     Diabetes Mother      Other Cancer Mother 78        stage 4 lung cancer     Diabetes Father      Cerebrovascular Disease Father 75     Cardiovascular Father 75        heart attack         Current Outpatient  Medications   Medication Sig Dispense Refill     chlorthalidone (HYGROTON) 25 MG tablet TAKE 1 TABLET DAILY BY MOUTH 90 tablet 0     metoprolol succinate ER (TOPROL-XL) 50 MG 24 hr tablet Take 1 tablet (50 mg) by mouth daily Please schedule an office visit. 90 tablet 0     phentermine (ADIPEX-P) 30 MG capsule TAKE 1 CAPSULE BY MOUTH EVERY MORNING 30 capsule 5     rOPINIRole (REQUIP) 0.25 MG tablet 1 tab 1-3 hors before HS 30 tablet 1     Allergies   Allergen Reactions     Nkda [No Known Drug Allergies]        Reviewed and updated as needed this visit by Provider         Review of Systems   Constitutional, HEENT, cardiovascular, pulmonary, gi and gu systems are negative, except as otherwise noted.      Objective    There were no vitals taken for this visit.  There is no height or weight on file to calculate BMI.  Physical Exam     Physical Exam:  Constitutional: healthy, alert and no distress  CV: RRR. No murmur. No pretibial edema  Pulm: Lungs clear to auscultation without wheeze, rales or rhonchi.  Skin: no suspicious lesions or rashes  MS: On exam no edema, erythema right ring finger. Good ROM on exam.  Psych: mood is euthymic with corresponding affect                Assessment & Plan     (M65.30) Trigger finger, acquired  (primary encounter diagnosis)  Comment: Refer for ortho consult  Plan: ORTHOPEDIC ADULT REFERRAL            (I10) Hypertension goal BP (blood pressure) < 140/90  Comment: stable  Plan: Schedule physical    (G25.81) Restless legs syndrome (RLS)  Comment: Trial and f/u if she needs dose adjustment  Plan: rOPINIRole (REQUIP) 0.25 MG tablet                       No follow-ups on file.    RENETTA Marlow  [facility]

## 2020-08-07 ASSESSMENT — ANXIETY QUESTIONNAIRES: GAD7 TOTAL SCORE: 0

## 2020-08-20 ENCOUNTER — OFFICE VISIT (OUTPATIENT)
Dept: FAMILY MEDICINE | Facility: OTHER | Age: 55
End: 2020-08-20
Attending: PHYSICIAN ASSISTANT
Payer: COMMERCIAL

## 2020-08-20 ENCOUNTER — NURSE TRIAGE (OUTPATIENT)
Dept: FAMILY MEDICINE | Facility: OTHER | Age: 55
End: 2020-08-20

## 2020-08-20 DIAGNOSIS — Z20.822 EXPOSURE TO COVID-19 VIRUS: ICD-10-CM

## 2020-08-20 DIAGNOSIS — R05.9 COUGH: Primary | ICD-10-CM

## 2020-08-20 PROCEDURE — U0003 INFECTIOUS AGENT DETECTION BY NUCLEIC ACID (DNA OR RNA); SEVERE ACUTE RESPIRATORY SYNDROME CORONAVIRUS 2 (SARS-COV-2) (CORONAVIRUS DISEASE [COVID-19]), AMPLIFIED PROBE TECHNIQUE, MAKING USE OF HIGH THROUGHPUT TECHNOLOGIES AS DESCRIBED BY CMS-2020-01-R: HCPCS | Performed by: FAMILY MEDICINE

## 2020-08-20 NOTE — TELEPHONE ENCOUNTER
"Pt called, requesting covid testing. Reports runny nose and cough that started tuesday. Denies SOB, fever. Also was exposed her son with confirmed positive. Scheduled for curbside testing. Advised to quarantine per recommendations from MD/CDC, symptomatic treatment, call back with change or worsening in symptoms. Pt verbalizes understanding.       Reason for Disposition    [1] Symptoms of COVID-19 (e.g., cough, fever, SOB, or others) AND [2] lives in an area with community spread    [1] COVID-19 infection suspected by caller or triager AND [2] mild symptoms (cough, fever, or others) AND [3] no complications or SOB    Answer Assessment - Initial Assessment Questions  1. CLOSE CONTACT: \"Who is the person with the confirmed or suspected COVID-19 infection that you were exposed to?\"      son  2. PLACE of CONTACT: \"Where were you when you were exposed to COVID-19?\" (e.g., home, school, medical waiting room; which city?)      home  3. TYPE of CONTACT: \"How much contact was there?\" (e.g., sitting next to, live in same house, work in same office, same building)      Son was at pt's house  4. DURATION of CONTACT: \"How long were you in contact with the COVID-19 patient?\" (e.g., a few seconds, passed by person, a few minutes, live with the patient)      A few hours  5. DATE of CONTACT: \"When did you have contact with a COVID-19 patient?\" (e.g., how many days ago)      8/13 or 8/14  6. TRAVEL: \"Have you traveled out of the country recently?\" If so, \"When and where?\"      * Also ask about out-of-state travel, since the CDC has identified some high-risk cities for community spread in the [place].      * Note: Travel becomes less relevant if there is widespread community transmission where the patient lives.      no  7. COMMUNITY SPREAD: \"Are there lots of cases of COVID-19 (community spread) where you live?\" (See public health department website, if unsure)        yes  8. SYMPTOMS: \"Do you have any symptoms?\" (e.g., fever, cough, " "breathing difficulty)      Runny nose and cough- mild  9. PREGNANCY OR POSTPARTUM: \"Is there any chance you are pregnant?\" \"When was your last menstrual period?\" \"Did you deliver in the last 2 weeks?\"      no  10. HIGH RISK: \"Do you have any heart or lung problems? Do you have a weak immune system?\" (e.g., CHF, COPD, asthma, HIV positive, chemotherapy, renal failure, diabetes mellitus, sickle cell anemia)        No    Protocols used: CORONAVIRUS (COVID-19) EXPOSURE-A- 5.16.20, CORONAVIRUS (COVID-19) DIAGNOSED OR [id number] 5.16.20      "

## 2020-08-22 LAB
SARS-COV-2 RNA SPEC QL NAA+PROBE: NOT DETECTED
SPECIMEN SOURCE: NORMAL

## 2020-09-30 NOTE — LETTER
April 26, 2019      Nadeen Robles  [address]  [place] [address]        To Whom It May Concern:    Nadeen Robles was seen in our clinic. Off work April 24-May 3 due to right knee pain.      Sincerely,        Sunitha Lindsey PA          
Sent from urgent care with right leg pain, swelling and cold sensation to r/o DVT

## 2020-10-15 DIAGNOSIS — I10 BENIGN ESSENTIAL HYPERTENSION: ICD-10-CM

## 2020-10-15 RX ORDER — CHLORTHALIDONE 25 MG/1
TABLET ORAL
Qty: 90 TABLET | Refills: 0 | Status: SHIPPED | OUTPATIENT
Start: 2020-10-15 | End: 2021-01-18

## 2020-10-15 NOTE — TELEPHONE ENCOUNTER
Alivia       Last Written Prescription Date:  6/17/2020  Last Fill Quantity: 90,   # refills: 0  Last Office Visit: 8/6/2020  Future Office visit:

## 2020-11-08 DIAGNOSIS — I10 BENIGN ESSENTIAL HYPERTENSION: ICD-10-CM

## 2020-11-09 NOTE — TELEPHONE ENCOUNTER
metoprolol succinate ER (TOPROL-XL) 50 MG 24 hr tablet     Last Written Prescription Date:  08/6/20  Last Fill Quantity: 90,   # refills: 0  Last Office Visit: 08/06/20  Future Office visit:

## 2020-11-11 RX ORDER — METOPROLOL SUCCINATE 50 MG/1
50 TABLET, EXTENDED RELEASE ORAL DAILY
Qty: 90 TABLET | Refills: 0 | Status: SHIPPED | OUTPATIENT
Start: 2020-11-11 | End: 2020-11-17

## 2020-11-14 DIAGNOSIS — I10 BENIGN ESSENTIAL HYPERTENSION: ICD-10-CM

## 2020-11-17 RX ORDER — METOPROLOL SUCCINATE 50 MG/1
50 TABLET, EXTENDED RELEASE ORAL DAILY
Qty: 90 TABLET | Refills: 0 | Status: SHIPPED | OUTPATIENT
Start: 2020-11-17 | End: 2021-02-23

## 2020-11-17 NOTE — TELEPHONE ENCOUNTER
metoprolol succinate ER (TOPROL-XL) 50 MG 24 hr tablet     Last Written Prescription Date:  11/11/2020  Last Fill Quantity: 90,   # refills: 0  Last Office Visit: 08/06/2020  Future Office visit:       Routing refill request to provider for review/approval because:

## 2020-12-07 ENCOUNTER — OFFICE VISIT (OUTPATIENT)
Dept: FAMILY MEDICINE | Facility: OTHER | Age: 55
End: 2020-12-07
Attending: PHYSICIAN ASSISTANT
Payer: COMMERCIAL

## 2020-12-07 ENCOUNTER — NURSE TRIAGE (OUTPATIENT)
Dept: FAMILY MEDICINE | Facility: OTHER | Age: 55
End: 2020-12-07

## 2020-12-07 DIAGNOSIS — R05.9 COUGH: ICD-10-CM

## 2020-12-07 DIAGNOSIS — R05.9 COUGH: Primary | ICD-10-CM

## 2020-12-07 PROCEDURE — U0003 INFECTIOUS AGENT DETECTION BY NUCLEIC ACID (DNA OR RNA); SEVERE ACUTE RESPIRATORY SYNDROME CORONAVIRUS 2 (SARS-COV-2) (CORONAVIRUS DISEASE [COVID-19]), AMPLIFIED PROBE TECHNIQUE, MAKING USE OF HIGH THROUGHPUT TECHNOLOGIES AS DESCRIBED BY CMS-2020-01-R: HCPCS | Performed by: PHYSICIAN ASSISTANT

## 2020-12-07 NOTE — TELEPHONE ENCOUNTER
"Discharge Instructions for COVID-19 Patients  You have--or may have--COVID-19. Please follow the instructions listed below.   If you have a weakened immune system, discuss with your doctor any other actions you need to take.  How can I protect others?  If you have symptoms (fever, cough, body aches or trouble breathing):    Stay home and away from others (self-isolate) until:  ? At least 10 days have passed since your symptoms started, And   ? You've had no fever--and no medicine that reduces fever--for 1 full day (24 hours), And    ? Your other symptoms have resolved (gotten better).  If you don't show symptoms, but testing showed that you have COVID-19:    Stay home and away from others (self-isolate). Follow the tips under \"How do I self-isolate?\" below for 10 days (20 days if you have a weak immune system).    You don't need to be retested for COVID-19 before going back to school or work. As long as you're fever-free and feeling better, you can go back to school, work and other activities after waiting the 10 or 20 days.   How do I self-isolate?    Stay in your own room, even for meals. Use your own bathroom if you can.    Stay away from others in your home. No hugging, kissing or shaking hands. No visitors.    Don't go to work, school or anywhere else.    Clean \"high touch\" surfaces often (doorknobs, counters, handles). Use household cleaning spray or wipes. You'll find a full list of  on the EPA website: www.epa.gov/pesticide-registration/list-n-disinfectants-use-against-sars-cov-2.    Cover your mouth and nose with a mask or other face covering to avoid spreading germs.    Wash your hands and face often. Use soap and water.    Caregivers in these groups are at risk for severe illness due to COVID-19:  ? People 65 years and older  ? People who live in a nursing home or long-term care facility  ? People with chronic disease (lung, heart, cancer, diabetes, kidney, liver, immunologic)  ? People who have a " weakened immune system, including those who:    Are in cancer treatment    Take medicine that weakens the immune system, such as corticosteroids    Had a bone marrow or organ transplant    Have an immune deficiency    Have poorly controlled HIV or AIDS    Are obese (body mass index of 40 or higher)    Smoke regularly    Caregivers should wear gloves while washing dishes, handling laundry and cleaning bedrooms and bathrooms.    Use caution when washing and drying laundry: Don't shake dirty laundry and use the warmest water setting that you can.    For more tips on managing your health at home, go to www.cdc.gov/coronavirus/2019-ncov/downloads/10Things.pdf.  How can I take care of myself at home?  1. Get lots of rest. Drink extra fluids (unless a doctor has told you not to).    2. Take Tylenol (acetaminophen) for fever or pain. If you have liver or kidney problems, ask your family doctor if it's okay to take Tylenol.     Adults can take either:  ? 650 mg (two 325 mg pills) every 4 to 6 hours, or   ? 1,000 mg (two 500 mg pills) every 8 hours as needed.  ? Note: Don't take more than 3,000 mg in one day. Acetaminophen is found in many medicines (both prescribed and over-the-counter medicines). Read all labels to be sure you don't take too much.   For children, check the Tylenol bottle for the right dose. The dose is based on the child's age or weight.  3. If you have other health problems (like cancer, heart failure, an organ transplant or severe kidney disease): Call your specialty clinic if you don't feel better in the next 2 days.    4. Know when to call 911. Emergency warning signs include:  ? Trouble breathing or shortness of breath  ? Pain or pressure in the chest that doesn't go away  ? Feeling confused like you haven't felt before, or not being able to wake up  ? Bluish-colored lips or face    5. Your doctor may have prescribed a blood thinner medicine. Follow their instructions.  Where can I get more  information?    [facility] - About COVID-19: [email or web address]    CDC - What to Do If You're Sick: www.cdc.gov/coronavirus/2019-ncov/about/steps-when-sick.html    CDC - Ending Home Isolation: www.cdc.gov/coronavirus/2019-ncov/hcp/disposition-in-home-patients.html    CDC - Caring for Someone: www.cdc.gov/coronavirus/2019-ncov/if-you-are-sick/care-for-someone.html    [facility] - Interim Guidance for Hospital Discharge to Home: www.health.[facility].[place]./diseases/coronavirus/hcp/hospdischarge.pdf    [facility] clinical trials (COVID-19 research studies): clinicalaffairs.[facility]-clinical-trials    Below are the COVID-19 hotlines at the [place] Department of Health ([facility]). Interpreters are available.  ? For health questions: Call 122-251-7610 or 1-554.468.3523 (7 a.m. to 7 p.m.)  ? For questions about schools and childcare: Call 259-539-3807 or 1-509.573.4963 (7 a.m. to 7 p.m.)    For informational purposes only. Not to replace the advice of your health care provider. Clinically reviewed by the Infection Prevention Team. Copyright   2020 [facility]. All rights reserved. [employer] 486303 - REV 08/04/20.      Instructions for Patients  It is recommended that you have a test for coronavirus (COVID-19). This illness can cause fever, cough and trouble breathing. Many people get a mild case and get better on their own. Some people can get very sick.     Please follow these steps:    1. We will call to schedule your test.  2. A member of our care team will ask you some questions. Then, they will use a swab to collect samples from your nose and throat.     Our testing team will send you your test results.    How can I protect others?    Stay home and away from others (self-isolate) until:    You ve had no fever--and no medicine that reduces fever--for 1 full day (24 hours). And      Your other symptoms have resolved (gotten better). For example, your cough or breathing has improved.  And     At least 10 days have passed since your symptoms started.    Stay at least 6 feet away from others. (If someone will drive you to your test, stay in the backseat, as far away from the  as you can.)     Don t go to work, school or anywhere else. When it s time for your test, go straight to the testing site. Don t make any stops on the way there or back.     Wash your hands and face often. Use soap and water.     Cover your mouth and nose with a mask, tissue or washcloth.     Don t touch anyone. No hugging, kissing or handshakes.    How can I take care of myself?    1. Get lots of rest. Drink extra fluids (unless a doctor has told you not to).     2. Take Tylenol (acetaminophen) for fever or pain. If you have liver or kidney problems, ask your family doctor if it's okay to take Tylenol.     Adults can take either:     650 mg (two 325 mg pills) every 4 to 6 hours, or     1,000 mg (two 500 mg pills) every 8 hours as needed.     Note: Don't take more than 3,000 mg in one day.   Acetaminophen is found in many medicines (both prescribed and over-the-counter medicines). Read all labels to be sure you don't take too much.   For children, check the Tylenol bottle for the right dose. The dose is based on  the child's age or weight.    3. If you have other health problems (like cancer, heart failure, an organ transplant or severe kidney disease): Call your specialty clinic if you don't feel better in the next 2 days.    4. Know when to call 911: If your breathing is so bad that it keeps you from doing normal activities, call 911 or go to the emergency room. Tell them that you've been staying home and may have COVID-19.      Thank you for taking steps to prevent the spread of this virus.  o Limit your contact with others.  o Wear a simple mask to cover your cough.  o Wash your hands well and often.  o If you need medical care, go to [email or web address] or contact your health care provider.     For more about COVID-19 and  caring for yourself at home, visit the CDC website at https://www.cdc.gov/coronavirus/2019-ncov/about/steps-when-sick.html.     To learn about care at [facility], please go to [email or web address].     [facility] clinical trials (COVID-19 research studies): clinicalaffairs.[facility]-clinical-trials.    Below are the COVID-19 hotlines at the [place] Department of Health ([facility]). Interpreters are available.     For health questions: Call 700-563-1026 or 1-561.738.8019 (7 a.m. to 7 p.m.)    For questions about schools and childcare: Call 678-049-7285 or 1-627.227.8560 (7 a.m. to 7 p.m.)    COVID 19 Nurse Triage Plan/Patient Instructions    Please be aware that novel coronavirus (COVID-19) may be circulating in the community. If you develop symptoms such as fever, cough, or SOB or if you have concerns about the presence of another infection including coronavirus (COVID-19), please contact your health care provider or visit [email or web address].     Disposition/Instructions    Home care recommended. Follow home care protocol based instructions.    Thank you for taking steps to prevent the spread of this virus.  o Limit your contact with others.  o Wear a simple mask to cover your cough.  o Wash your hands well and often.    Resources    [facility]: About COVID-19: [email or web address]    CDC: What to Do If You're Sick: www.cdc.gov/coronavirus/2019-ncov/about/steps-when-sick.html    CDC: Ending Home Isolation: www.cdc.gov/coronavirus/2019-ncov/hcp/disposition-in-home-patients.html     CDC: Caring for Someone: www.cdc.gov/coronavirus/2019-ncov/if-you-are-sick/care-for-someone.html     [facility]: Interim Guidance for Hospital Discharge to Home: www.health.[facility].[place].us/diseases/coronavirus/hcp/hospdischarge.pdf    [facility] clinical trials (COVID-19 research studies): clinicalaffairs.[facility]/umn-clinical-trials     Below are the COVID-19 hotlines at the  [place] Department of Health ([facility]). Interpreters are available.   o For health questions: Call 815-549-4408 or 1-685.909.2589 (7 a.m. to 7 p.m.)  o For questions about schools and childcare: Call 416-502-0542 or 1-449.479.3496 (7 a.m. to 7 p.m.)                   Reason for Disposition    [1] Symptoms of COVID-19 (e.g., cough, fever, SOB, or others) AND [2] within 14 days of EXPOSURE (close contact) with diagnosed or suspected COVID-19 patient    [1] COVID-19 infection suspected by caller or triager AND [2] mild symptoms (cough, fever, or others) AND [3] no complications or SOB    Additional Information    Negative: SEVERE difficulty breathing (e.g., struggling for each breath, speaks in single words)    Negative: Difficult to awaken or acting confused (e.g., disoriented, slurred speech)    Negative: Bluish (or gray) lips or face now    Negative: Shock suspected (e.g., cold/pale/clammy skin, too weak to stand, low BP, rapid pulse)    Negative: Sounds like a life-threatening emergency to the triager    Negative: [1] COVID-19 exposure AND [2] no symptoms    Negative: COVID-19 and Breastfeeding, questions about    Negative: [1] Adult with possible COVID-19 symptoms AND [2] triager concerned about severity of symptoms or other causes    Negative: SEVERE or constant chest pain or pressure (Exception: mild central chest pain, present only when coughing)    Negative: MODERATE difficulty breathing (e.g., speaks in phrases, SOB even at rest, pulse 100-120)    Negative: Patient sounds very sick or weak to the triager    Negative: MILD difficulty breathing (e.g., minimal/no SOB at rest, SOB with walking, pulse <100)    Negative: Chest pain or pressure    Negative: Fever > 103 F (39.4 C)    Negative: [1] Fever > 101 F (38.3 C) AND [2] age > 60    Negative: [1] Fever > 100.0 F (37.8 C) AND [2] bedridden (e.g., nursing home patient, CVA, chronic illness, recovering from surgery)    Negative: HIGH RISK patient (e.g., age > 64  "years, diabetes, heart or lung disease, weak immune system) (Exception: Has already been evaluated by healthcare provider and has no new or worsening symptoms)    Negative: Fever present > 3 days (72 hours)    Negative: [1] Fever returns after gone for over 24 hours AND [2] symptoms worse or not improved    Negative: [1] Continuous (nonstop) coughing interferes with work or school AND [2] no improvement using cough treatment per protocol    Answer Assessment - Initial Assessment Questions  1. CLOSE CONTACT: \"Who is the person with the confirmed or suspected COVID-19 infection that you were exposed to?\"   boss  2. PLACE of CONTACT: \"Where were you when you were exposed to COVID-19?\" (e.g., home, school, medical waiting room; which city?)      At work  3. TYPE of CONTACT: \"How much contact was there?\" (e.g., sitting next to, live in same house, work in same office, same building)      Same building  4. DURATION of CONTACT: \"How long were you in contact with the COVID-19 patient?\" (e.g., a few seconds, passed by person, a few minutes, live with the patient)      More than 15 mintue  5. DATE of CONTACT: \"When did you have contact with a COVID-19 patient?\" (e.g., how many days ago)      12.4.2020  6. TRAVEL: \"Have you traveled out of the country recently?\" If so, \"When and where?\"      * Also ask about out-of-state travel, since the CDC has identified some high-risk cities for community spread in the .      * Note: Travel becomes less relevant if there is widespread community transmission where the patient lives.      no  7. COMMUNITY SPREAD: \"Are there lots of cases of COVID-19 (community spread) where you live?\" (See public health department website, if unsure)      yes  8. SYMPTOMS: \"Do you have any symptoms?\" (e.g., fever, cough, breathing difficulty)     Stuffy nose,coughin, HA  9. PREGNANCY OR POSTPARTUM: \"Is there any chance you are pregnant?\" \"When was your last menstrual period?\" \"Did you deliver in the last 2 " "weeks?\"      no  10. HIGH RISK: \"Do you have any heart or lung problems? Do you have a weak immune system?\" (e.g., CHF, COPD, asthma, HIV positive, chemotherapy, renal failure, diabetes mellitus, sickle cell anemia)       Chronic bronchitis    Answer Assessment - Initial Assessment Questions  1. COVID-19 DIAGNOSIS: \"Who made your Coronavirus (COVID-19) diagnosis?\" \"Was it confirmed by a positive lab test?\" If not diagnosed by a HCP, ask \"Are there lots of cases (community spread) where you live?\" (See public health department website, if unsure)       2. ONSET: \"When did the COVID-19 symptoms start?\"      Saturday  3. WORST SYMPTOM: \"What is your worst symptom?\" (e.g., cough, fever, shortness of breath, muscle aches)     coughing  4. COUGH: \"Do you have a cough?\" If so, ask: \"How bad is the cough?\"        Yes-not to bad yet  5. FEVER: \"Do you have a fever?\" If so, ask: \"What is your temperature, how was it measured, and when did it start?\"      no  6. RESPIRATORY STATUS: \"Describe your breathing?\" (e.g., shortness of breath, wheezing, unable to speak)       Sob up the stairs-declines appt and can manage at home at this time  7. BETTER-SAME-WORSE: \"Are you getting better, staying the same or getting worse compared to yesterday?\"  If getting worse, ask, \"In what way?\"      say  8. HIGH RISK DISEASE: \"Do you have any chronic medical problems?\" (e.g., asthma, heart or lung disease, weak immune system, etc.)      no  9. PREGNANCY: \"Is there any chance you are pregnant?\" \"When was your last menstrual period?\"      no  10. OTHER SYMPTOMS: \"Do you have any other symptoms?\"  (e.g., chills, fatigue, headache, loss of smell or taste, muscle pain, sore throat)       HA,sore throat,muscle pain, chills    Protocols used: CORONAVIRUS (COVID-19) EXPOSURE-A- 8.4.20, CORONAVIRUS (COVID-19) DIAGNOSED OR [id number] 8.4.20      "

## 2020-12-09 LAB
SARS-COV-2 RNA SPEC QL NAA+PROBE: NOT DETECTED
SPECIMEN SOURCE: NORMAL

## 2020-12-12 ENCOUNTER — HEALTH MAINTENANCE LETTER (OUTPATIENT)
Age: 55
End: 2020-12-12

## 2020-12-14 DIAGNOSIS — E66.01 MORBID OBESITY DUE TO EXCESS CALORIES (H): ICD-10-CM

## 2020-12-14 NOTE — TELEPHONE ENCOUNTER
phentermine (ADIPEX-P) 30 MG capsule      Last Written Prescription Date:  6/4/20  Last Fill Quantity: 30,   # refills: 5  Last Office Visit: 8/6/20  Future Office visit:       Routing refill request to provider for review/approval

## 2020-12-17 RX ORDER — PHENTERMINE HYDROCHLORIDE 30 MG/1
CAPSULE ORAL
Qty: 30 CAPSULE | Refills: 0 | Status: SHIPPED | OUTPATIENT
Start: 2020-12-17 | End: 2021-03-22

## 2020-12-21 ENCOUNTER — APPOINTMENT (OUTPATIENT)
Dept: GENERAL RADIOLOGY | Facility: HOSPITAL | Age: 55
End: 2020-12-21
Attending: NURSE PRACTITIONER
Payer: OTHER MISCELLANEOUS

## 2020-12-21 ENCOUNTER — HOSPITAL ENCOUNTER (EMERGENCY)
Facility: HOSPITAL | Age: 55
Discharge: HOME | End: 2020-12-21
Attending: NURSE PRACTITIONER | Admitting: NURSE PRACTITIONER
Payer: OTHER MISCELLANEOUS

## 2020-12-21 VITALS
DIASTOLIC BLOOD PRESSURE: 77 MMHG | OXYGEN SATURATION: 97 % | RESPIRATION RATE: 16 BRPM | TEMPERATURE: 96.1 F | HEART RATE: 66 BPM | SYSTOLIC BLOOD PRESSURE: 127 MMHG

## 2020-12-21 DIAGNOSIS — S51.011A LACERATION OF RIGHT ELBOW, INITIAL ENCOUNTER: ICD-10-CM

## 2020-12-21 DIAGNOSIS — S51.011A LACERATION OF RIGHT ELBOW: ICD-10-CM

## 2020-12-21 DIAGNOSIS — S59.901A INJURY OF RIGHT ELBOW, INITIAL ENCOUNTER: ICD-10-CM

## 2020-12-21 PROCEDURE — 12001 RPR S/N/AX/GEN/TRNK 2.5CM/<: CPT

## 2020-12-21 PROCEDURE — 12001 RPR S/N/AX/GEN/TRNK 2.5CM/<: CPT | Performed by: NURSE PRACTITIONER

## 2020-12-21 PROCEDURE — 999N000104 HC STATISTIC NO CHARGE

## 2020-12-21 PROCEDURE — 73070 X-RAY EXAM OF ELBOW: CPT | Mod: RT

## 2020-12-21 ASSESSMENT — ENCOUNTER SYMPTOMS
ACTIVITY CHANGE: 1
CHILLS: 0
NAUSEA: 0
VOMITING: 0
NUMBNESS: 0
WOUND: 1
FEVER: 0

## 2020-12-21 NOTE — DISCHARGE INSTRUCTIONS
Apply bacitracin and do daily dressing changes for the next 48 hours. You may shower but do not saturate the wound. If you have increased pain, redness at wound site, fevers, or abnormal drainage (purulent/pus) you need to see your primary care provider or return to Urgent Care/ER immediately. Acetaminophen/tylenol  or ibuprofen for pain.   Suture removal in seven to ten days.     Keep affected extremity elevated as much as possible for next 24 - 48 hours. Ice to affected area 20 minutes every hour as needed for comfort. After 48 hours you can apply heat. Ibuprofen 600 to 800 mg (3 - 4 tabs of over the counter med) every six to eight hours as needed;not to exceed maximum amount of 3200 mg in 24 hours. Take with food. Tylenol 650 to 1000 mg every four to six hours as needed (not to exceed more than 4000 mg in a 24 hour period). May use interchangeably. Suggest medicating around the clock for the next 24-48 hours.  Follow up with primary provider  as needed

## 2020-12-21 NOTE — ED TRIAGE NOTES
Pt is here with c/o laceration to right elbow  Pt notes she was shoveling and slipped on ice and fell onto right elbow  Pt denies hitting head, denies any LOC, denies any neck pain   Pt is soaking in hibiclens

## 2020-12-21 NOTE — ED PROVIDER NOTES
History     Chief Complaint   Patient presents with     Laceration     HPI  Nadeen Robles is a 55 year old female who presents with right elbow pain after she fell on the ice. This accompanied with a laceration on the right elbow and tingling in her right hand. Right hand dominant. Denies previous injury. Has taken 400 mg of ibuprofen that did decrease her discomfort. Non-smoker. Denies numbness in her right arm, shoulder and wrist pain. Denies fevers, chills, nausea, and vomiting, diarrhea, and shortness of breath.    Musculoskeletal problem/pain      Duration: today slipped on ice and hit right elbow. Right hand dominant    Description  Location: right elbow    Intensity:  0/10    Accompanying signs and symptoms: tingling into hands with movement    History  Previous similar problem: no   Previous evaluation:  none    Precipitating or alleviating factors:  Trauma or overuse: YES- fell on ice an hit elbow  Aggravating factors include: moving    Therapies tried and outcome:  and Ibuprofen 400 mg at 0800 helped         Allergies:  Allergies   Allergen Reactions     Nkda [No Known Drug Allergies]        Problem List:    Patient Active Problem List    Diagnosis Date Noted     Papanicolaou smear of cervix with atypical squamous cells of undetermined significance (ASC-US) 08/16/2017     Priority: Medium     Exposure 08/09/2017     Priority: Medium     ACP (advance care planning) 01/06/2017     Priority: Medium     Advance Care Planning 1/6/2017: ACP Review of Chart / Resources Provided:  Reviewed chart for advance care plan.  Nadeen Robles has been provided information and resources to begin or update their advance care plan.  Added by Venus Mckenna             Hypokalemia 10/16/2011     Priority: Medium     Hypertension goal BP (blood pressure) < 140/90 01/04/2011     Priority: Medium     CARDIOVASCULAR SCREENING; LDL GOAL LESS THAN 160 10/31/2010     Priority: Medium     Obesity 03/26/2010     Priority:  Medium     Problem list name updated by automated process. Provider to review          Past Medical History:    Past Medical History:   Diagnosis Date     Autoimmune hepatitis (H)      HTN (hypertension)      Migraine      Obesity        Past Surgical History:    Past Surgical History:   Procedure Laterality Date     cholecystectomy  1991     HC REMOVAL GALLBLADDER  1990       Family History:    Family History   Problem Relation Age of Onset     Diabetes Mother      Other Cancer Mother 78        stage 4 lung cancer     Diabetes Father      Cerebrovascular Disease Father 75     Cardiovascular Father 75        heart attack       Social History:  Marital Status:   [4]  Social History     Tobacco Use     Smoking status: Never Smoker     Smokeless tobacco: Never Used     Tobacco comment: no 2nd hand smoke exposure   Substance Use Topics     Alcohol use: Yes     Comment: rarely     Drug use: No        Medications:         chlorthalidone (HYGROTON) 25 MG tablet       metoprolol succinate ER (TOPROL-XL) 50 MG 24 hr tablet       phentermine (ADIPEX-P) 30 MG capsule       rOPINIRole (REQUIP) 0.25 MG tablet          Review of Systems   Constitutional: Positive for activity change. Negative for chills and fever.   Gastrointestinal: Negative for nausea and vomiting.   Musculoskeletal:        Right elbow pain and swelling.   Skin: Positive for wound.   Neurological: Negative for numbness (tingling in hand).       Physical Exam   BP: 127/77  Pulse: 66  Temp: 96.1  F (35.6  C)  Resp: 16  SpO2: 97 %      Physical Exam  Vitals signs and nursing note reviewed.   Constitutional:       General: She is in acute distress.      Appearance: She is overweight.   Cardiovascular:      Rate and Rhythm: Normal rate.   Pulmonary:      Effort: Pulmonary effort is normal.   Musculoskeletal:         General: Swelling (mild to moderate), tenderness and signs of injury present.      Right elbow: She exhibits swelling (mild to moderate over  olecranon) and laceration. She exhibits normal range of motion. Tenderness found. Olecranon process tenderness noted.        Arms:    Skin:     General: Skin is warm and dry.      Findings: No bruising or erythema.   Neurological:      Mental Status: She is alert and oriented to person, place, and time.   Psychiatric:         Behavior: Behavior normal.         ED Course        [facility]    -Laceration Repair    Date/Time: 12/21/2020 2:59 PM  Performed by: Vaishali Simmons CNP  Authorized by: Vaishali Simmons CNP       ANESTHESIA (see MAR for exact dosages):     Anesthesia method:  Local infiltration    Local anesthetic:  Lidocaine 1% w/o epi  LACERATION DETAILS     Location:  Shoulder/arm    Shoulder/arm location:  R elbow    Length (cm):  0.5    Depth (mm):  4    REPAIR TYPE:     Repair type:  Simple      EXPLORATION:     Hemostasis achieved with:  Direct pressure    Wound exploration: wound explored through full range of motion and entire depth of wound probed and visualized      Wound extent: no foreign body, no nerve damage, no tendon damage and no underlying fracture      Wound extent comment:  Minute amount of adipose tissue noted  in wound    TREATMENT:     Wound cleansed with: cloraprep.    Amount of cleaning:  Standard    Irrigation solution:  Sterile saline    Irrigation volume:  10    Irrigation method:  Syringe    Visualized foreign bodies/material removed: no      SKIN REPAIR     Repair method:  Sutures    Suture size:  4-0    Suture material:  Nylon    Suture technique:  Simple interrupted    Number of sutures:  2    APPROXIMATION     Approximation:  Close    POST-PROCEDURE DETAILS     Dressing:  Antibiotic ointment and non-adherent dressing      PROCEDURE   Patient Tolerance:  Patient tolerated the procedure well with no immediate complications  Describe Procedure: Wound soaked in Hibiclens and water. Injected with 2 ml of 1% lidocaine. Irrigated with 10 ml of NS and cleaned  with Chloraprep. two sutures of 4.0 nylon placed in 0.5 cm x 2 mm x 4 mm laceration. Triple antibiotic and dressing applied. Neurovascular status intact before and after procedure.                 Results for orders placed or performed during the hospital encounter of 12/21/20 (from the past 24 hour(s))   Elbow XR, 2 views, right    Narrative    XR ELBOW RT 2 VW    HISTORY: 55 years Female right elbow pain from falling and slipping on  ice    COMPARISON: None    TECHNIQUE: 2 views right elbow    FINDINGS: There is some degenerative bony proliferation of the  olecranon and distal humerus. There is soft tissue edema posteriorly.  Question small amount of soft tissue air posterior to the olecranon.    There is no evidence of fracture.      Impression    IMPRESSION: Soft tissue injury. No evidence of fracture or  dislocation.    RADHA GREEN MD       Medications - No data to display    Assessments & Plan (with Medical Decision Making)     I have reviewed the nursing notes.    I have reviewed the findings, diagnosis, plan and need for follow up with the patient.  (S51.011A) Laceration of right elbow    (S59.903A) Injury of right elbow, initial encounter  Comment: 55 year old female who presents with right elbow pain after she fell on the ice. This accompanied with a laceration on the right elbow and tingling in her right hand. Right hand dominant. Denies previous injury. Has taken 400 mg of ibuprofen that did decrease her discomfort. Non-smoker. Denies numbness in her right arm, shoulder and wrist pain. Denies fevers, chills, nausea, and vomiting, diarrhea, and shortness of breath.    MDM: right elbow pain. Normal ROM. Good thumb to finger opposition. Right radial pulse 3+  5 mm x 2 mm x 4 mm laceration over olecranon. See procedure note for repair.    Right elbow x-ray reviewed and per radiology:  FINDINGS: There is some degenerative bony proliferation of the olecranon and distal humerus. There is soft tissue edema  posteriorly. Question small amount of soft tissue air posterior to the olecranon.   There is no evidence of fracture.   IMPRESSION: Soft tissue injury. No evidence of fracture or  dislocation.     Plan: education provided for laceration.  Apply bacitracin and do daily dressing changes for the next 48 hours. You may shower but do not saturate the wound. If you have increased pain, redness at wound site, fevers, or abnormal drainage (purulent/pus) you need to see your primary care provider or return to Urgent Care/ER immediately. Acetaminophen/tylenol  or ibuprofen for pain.   Suture removal in seven to ten days.     Keep affected extremity elevated as much as possible for next 24 - 48 hours. Ice to affected area 20 minutes every hour as needed for comfort. After 48 hours you can apply heat. Ibuprofen 600 to 800 mg (3 - 4 tabs of over the counter med) every six to eight hours as needed;not to exceed maximum amount of 3200 mg in 24 hours. Take with food. Tylenol 650 to 1000 mg every four to six hours as needed (not to exceed more than 4000 mg in a 24 hour period). May use interchangeably. Suggest medicating around the clock for the next 24-48 hours.  Follow up with primary provider  as needed  These discharge instructions and medications were reviewed with her and understanding verbalized.    Discharge Medication List as of 12/21/2020  1:36 PM          Final diagnoses:   Laceration of right elbow   Injury of right elbow, initial encounter       12/21/2020   [facility]       Vaishali Simmons, CNP  12/21/20 [id number]

## 2020-12-21 NOTE — ED TRIAGE NOTES
Pt presents with c/o slipping on the ice and landing on her right elbow. Pt now has a laceration to her elbow.

## 2020-12-21 NOTE — ED AVS SNAPSHOT
[facility]  [address] [address]  Phone: [phone]                                    Nadeen Robles   MRN: [id number]    Department: [facility]   Date of Visit: 12/21/2020           After Visit Summary Signature Page    I have received my discharge instructions, and my questions have been answered. I have discussed any challenges I see with this plan with the nurse or doctor.    ..........................................................................................................................................  Patient/Patient Representative Signature      ..........................................................................................................................................  Patient Representative Print Name and Relationship to Patient    ..................................................               ................................................  Date                                   Time    ..........................................................................................................................................  Reviewed by Signature/Title    ...................................................              ..............................................  Date                                               Time          22EPIC Rev 08/18

## 2021-01-18 DIAGNOSIS — I10 BENIGN ESSENTIAL HYPERTENSION: ICD-10-CM

## 2021-01-18 RX ORDER — CHLORTHALIDONE 25 MG/1
TABLET ORAL
Qty: 90 TABLET | Refills: 0 | Status: SHIPPED | OUTPATIENT
Start: 2021-01-18 | End: 2021-04-20

## 2021-01-18 NOTE — TELEPHONE ENCOUNTER
chlorothalidone      Last Written Prescription Date:  10/15/2020  Last Fill Quantity: 90,   # refills: 0  Last Office Visit: 08/06/2020  Future Office visit:

## 2021-02-21 ENCOUNTER — HEALTH MAINTENANCE LETTER (OUTPATIENT)
Age: 56
End: 2021-02-21

## 2021-02-23 DIAGNOSIS — I10 BENIGN ESSENTIAL HYPERTENSION: ICD-10-CM

## 2021-02-23 RX ORDER — METOPROLOL SUCCINATE 50 MG/1
50 TABLET, EXTENDED RELEASE ORAL DAILY
Qty: 90 TABLET | Refills: 0 | Status: SHIPPED | OUTPATIENT
Start: 2021-02-23 | End: 2021-05-10

## 2021-02-23 NOTE — TELEPHONE ENCOUNTER
metoprolol succinate ER (TOPROL-XL) 50 MG 24 hr tablet      Last Written Prescription Date:  11/17/20  Last Fill Quantity: 90,   # refills: 0  Last Office Visit: 8/6/20  Future Office visit:       Routing refill request to provider for review/approval

## 2021-03-21 DIAGNOSIS — E66.01 MORBID OBESITY DUE TO EXCESS CALORIES (H): ICD-10-CM

## 2021-03-22 RX ORDER — PHENTERMINE HYDROCHLORIDE 30 MG/1
CAPSULE ORAL
Qty: 30 CAPSULE | Refills: 0 | Status: SHIPPED | OUTPATIENT
Start: 2021-03-22 | End: 2021-05-18

## 2021-03-22 NOTE — TELEPHONE ENCOUNTER
Patient notified that prescription was refilled this month but will need an appointment before the next refill.  Patient verbalized understanding.    Earnestine JAMES LPN

## 2021-03-22 NOTE — TELEPHONE ENCOUNTER
phentermine (ADIPEX-P) 30 MG capsule    Last Written Prescription Date:  12/17/20  Last Fill Quantity: 30,   # refills: 0  Last Office Visit: 8/6/20  Future Office visit:       Routing refill request to provider for review/approval

## 2021-04-11 ENCOUNTER — HEALTH MAINTENANCE LETTER (OUTPATIENT)
Age: 56
End: 2021-04-11

## 2021-04-18 DIAGNOSIS — I10 BENIGN ESSENTIAL HYPERTENSION: ICD-10-CM

## 2021-04-19 NOTE — TELEPHONE ENCOUNTER
Chlorthalidone (Hygroton) 25 mg tablet  Take 1 tablet by mouth every day        Last Written Prescription Date:  1-18-21  Last Fill Quantity: 90,   # refills: 0  Last Office Visit: 8-6-20  Future Office visit:

## 2021-04-20 DIAGNOSIS — I10 BENIGN ESSENTIAL HYPERTENSION: ICD-10-CM

## 2021-04-20 RX ORDER — CHLORTHALIDONE 25 MG/1
TABLET ORAL
Qty: 90 TABLET | Refills: 0 | Status: SHIPPED | OUTPATIENT
Start: 2021-04-20 | End: 2021-04-22

## 2021-04-21 NOTE — TELEPHONE ENCOUNTER
Hygroton 25 mg      Last Written Prescription Date:  4/20/21  Last Fill Quantity: 90,   # refills: 0  Last Office Visit: 8/6/20  Future Office visit:    Next 5 appointments (look out 90 days)    Apr 23, 2021  1:00 PM  (Arrive by 12:45 PM)  SHORT with RENETTA Stallworth  [facility] ([facility] ) [address]  [facility] [address]  [phone]           Routing refill request to provider for review/approval because:

## 2021-04-22 RX ORDER — INFLUENZA A VIRUS A/NEBRASKA/14/2019 (H1N1) ANTIGEN (MDCK CELL DERIVED, PROPIOLACTONE INACTIVATED), INFLUENZA A VIRUS A/DELAWARE/39/2019 (H3N2) ANTIGEN (MDCK CELL DERIVED, PROPIOLACTONE INACTIVATED), INFLUENZA B VIRUS B/SINGAPORE/INFTT-16-0610/2016 ANTIGEN (MDCK CELL DERIVED, PROPIOLACTONE INACTIVATED), INFLUENZA B VIRUS B/DARWIN/7/2019 ANTIGEN (MDCK CELL DERIVED, PROPIOLACTONE INACTIVATED) 15; 15; 15; 15 UG/.5ML; UG/.5ML; UG/.5ML; UG/.5ML
INJECTION, SUSPENSION INTRAMUSCULAR
COMMUNITY
Start: 2020-09-30 | End: 2021-09-23

## 2021-04-22 RX ORDER — HEPATITIS B VACCINE (RECOMBINANT) 20 UG/ML
INJECTION, SUSPENSION INTRAMUSCULAR
COMMUNITY
Start: 2020-10-20 | End: 2021-05-26

## 2021-04-22 RX ORDER — CHLORTHALIDONE 25 MG/1
TABLET ORAL
Qty: 90 TABLET | Refills: 0 | Status: SHIPPED | OUTPATIENT
Start: 2021-04-22 | End: 2021-07-27

## 2021-04-22 NOTE — PROGRESS NOTES
Assessment & Plan     (M25.561) Acute pain of right knee  (primary encounter diagnosis)  Comment: xray normal except large effusion. Prednisone burst. Compression, elevation, ice. Off work April 21-23 as she is a . May need some days off next week if not improved.  Plan: XR Knee Right 3 Views (Clinic Performed),         predniSONE (DELTASONE) 20 MG tablet                             No follow-ups on file.    RENETTA Marlow  [facility] [place]    Maurisio GRAYSON is a 55 year old who presents for the following health issues. 5 day history of right knee swelling and pain after her grand dog ran into her lateral knee. Difficult to bear weight.    HPI     Musculoskeletal problem/pain  Onset/Duration: Monday 4/19/21  Description  Location: knee - right  Joint Swelling: YES  Redness: no  Pain: YES  Warmth: no  Intensity:  moderate  Progression of Symptoms:  worsening and constant  Accompanying signs and symptoms:   Fevers: no  Numbness/tingling/weakness: YES- toes tingling  History  Trauma to the area: YES- dog ran into her   Recent illness:  no  Previous similar problem: no  Previous evaluation:  no  Precipitating or alleviating factors:  Aggravating factors include: standing, walking, climbing stairs, lifting and overuse  Therapies tried and outcome: ice and Ibuprofen        Review of Systems   Constitutional, HEENT, cardiovascular, pulmonary, gi and gu systems are negative, except as otherwise noted.      Objective    /76 (BP Location: Right arm, Patient Position: Chair, Cuff Size: Adult Large)   Pulse 86   Temp 98.3  F (36.8  C) (Tympanic)   Resp 16   LMP 10/02/2018 (Approximate)   SpO2 97%   There is no height or weight on file to calculate BMI.  Physical Exam           Physical Exam:  Constitutional: healthy, alert and no acute distress  Skin: No suspicious lesions, erythema or ecchymosis  Musculoskeletal: Mild right knee edema. Pain with extension. TTP LCL and lateral  joint line  Neuroc: Sensation intact. DTR's 2+ and symmetric  Psych: mentation and affect appear normal

## 2021-04-23 ENCOUNTER — ANCILLARY PROCEDURE (OUTPATIENT)
Dept: GENERAL RADIOLOGY | Facility: OTHER | Age: 56
End: 2021-04-23
Attending: PHYSICIAN ASSISTANT
Payer: COMMERCIAL

## 2021-04-23 ENCOUNTER — OFFICE VISIT (OUTPATIENT)
Dept: FAMILY MEDICINE | Facility: OTHER | Age: 56
End: 2021-04-23
Attending: PHYSICIAN ASSISTANT
Payer: COMMERCIAL

## 2021-04-23 VITALS
DIASTOLIC BLOOD PRESSURE: 76 MMHG | OXYGEN SATURATION: 97 % | TEMPERATURE: 98.3 F | SYSTOLIC BLOOD PRESSURE: 126 MMHG | HEART RATE: 86 BPM | RESPIRATION RATE: 16 BRPM

## 2021-04-23 DIAGNOSIS — M25.561 ACUTE PAIN OF RIGHT KNEE: ICD-10-CM

## 2021-04-23 DIAGNOSIS — M25.561 ACUTE PAIN OF RIGHT KNEE: Primary | ICD-10-CM

## 2021-04-23 PROCEDURE — 99213 OFFICE O/P EST LOW 20 MIN: CPT | Performed by: PHYSICIAN ASSISTANT

## 2021-04-23 PROCEDURE — 73562 X-RAY EXAM OF KNEE 3: CPT | Mod: TC | Performed by: RADIOLOGY

## 2021-04-23 RX ORDER — PREDNISONE 20 MG/1
20 TABLET ORAL 2 TIMES DAILY
Qty: 10 TABLET | Refills: 0 | Status: SHIPPED | OUTPATIENT
Start: 2021-04-23 | End: 2021-04-28

## 2021-04-23 ASSESSMENT — PAIN SCALES - GENERAL: PAINLEVEL: MODERATE PAIN (4)

## 2021-04-23 NOTE — NURSING NOTE
"Chief Complaint   Patient presents with     Musculoskeletal Problem     RT knee injury       Initial /76 (BP Location: Right arm, Patient Position: Chair, Cuff Size: Adult Large)   Pulse 86   Temp 98.3  F (36.8  C) (Tympanic)   Resp 16   LMP 10/02/2018 (Approximate)   SpO2 97%  Estimated body mass index is 33.91 kg/m  as calculated from the following:    Height as of 11/20/18: 1.66 m (5' 5.35\").    Weight as of 8/6/20: 93.4 kg (206 lb).  Medication Reconciliation: complete  Pratima Thompson LPN  "

## 2021-04-23 NOTE — LETTER
April 23, 2021      Nadeen Robles  [address]  [place] [address]        To Whom It May Concern:    Nadeen Robles was seen in our clinic. Off work April 21-23 due to knee injury.      Sincerely,        RENETTA Marlow

## 2021-04-27 ENCOUNTER — TRANSFERRED RECORDS (OUTPATIENT)
Dept: HEALTH INFORMATION MANAGEMENT | Facility: CLINIC | Age: 56
End: 2021-04-27

## 2021-05-10 DIAGNOSIS — I10 BENIGN ESSENTIAL HYPERTENSION: ICD-10-CM

## 2021-05-10 RX ORDER — METOPROLOL SUCCINATE 50 MG/1
50 TABLET, EXTENDED RELEASE ORAL DAILY
Qty: 90 TABLET | Refills: 1 | Status: SHIPPED | OUTPATIENT
Start: 2021-05-10 | End: 2021-10-19

## 2021-05-18 DIAGNOSIS — E66.01 MORBID OBESITY DUE TO EXCESS CALORIES (H): ICD-10-CM

## 2021-05-19 NOTE — PROGRESS NOTES
"    Assessment & Plan     Encounter for medical examination to establish care  Records here.  Last pap 2017 ASCUS.  Overdue.  Physical scheduled prior to leaving today.    Essential hypertension  Stable - at goal.  Continue current regimen.  - Comprehensive metabolic panel (BMP + Alb, Alk Phos, ALT, AST, Total. Bili, TP)    Hypokalemia  Lab today.  - Comprehensive metabolic panel (BMP + Alb, Alk Phos, ALT, AST, Total. Bili, TP)    Special screening for malignant neoplasms, colon  Declines colonoscopy.  Agreeable to Cologuard.  - COLOGUARD([employer])    Encounter for screening mammogram for breast cancer  Overdue.    - MA Screening Digital Bilateral; Future    Hyperlipidemia, unspecified hyperlipidemia type  Update fasting lab today.  - Lipid Profile (Chol, Trig, HDL, LDL calc)  - Comprehensive metabolic panel (BMP + Alb, Alk Phos, ALT, AST, Total. Bili, TP)    BMI 32.0-32.9,adult  Referral to [facility] for medical weight management.  - CBC with platelets and differential  - TSH with free T4 reflex           BMI:   Estimated body mass index is 32.93 kg/m  as calculated from the following:    Height as of this encounter: 1.66 m (5' 5.35\").    Weight as of this encounter: 90.7 kg (200 lb).   Weight management plan: Discussed healthy diet and exercise guidelines    Patient Instructions   [facility] - for weight management.  Will put referral in place.  Labs today - will call with results.  Mammogram ordered.  Cologuard kit ordered.  Shingrix vaccine given.  Next due 2-6 months.    Schedule physical - pap/pelvic, etc.              Thi Petersen MD  [facility] [place]    Subjective   Tracy is a 55 year old who presents for the following health issues     HPI     New Patient/Transfer of Care    Due for colon screen, mammogram, pap/hpv, shingrix, etc.  No family history of colon cancer.  Was given cologuard - .    Last pap/hpv  - pap ASCUS.  Negative HPV.    Inquiring about vaginal " "estrogen.  New relationship after 5 years.     had cheated on her.    Weight - was taking Phenteramine - Sunitha Harris.    High -210 Low -175  Prior - herbal life, various OTC programs    Hypertension Follow-up; hypokalemia    Do you check your blood pressure regularly outside of the clinic? Yes     Are you following a low salt diet? Yes    Are your blood pressures ever more than 140 on the top number (systolic) OR more   than 90 on the bottom number (diastolic), for example 140/90? No   Toprol XL 50, hydrochlorothiazide 25 for years.  No other prior.  Some water retention.    Hyperlipidemia Follow-Up    Are you regularly taking any medication or supplement to lower your cholesterol?   No    Are you having muscle aches or other side effects that you think could be caused by your cholesterol lowering medication?  No     No prior medication    Orange juice today only      How many servings of fruits and vegetables do you eat daily?  2-3    On average, how many sweetened beverages do you drink each day (Examples: soda, juice, sweet tea, etc.  Do NOT count diet or artificially sweetened beverages)?   1    How many days per week do you exercise enough to make your heart beat faster? 5    How many minutes a day do you exercise enough to make your heart beat faster? 20 - 29    How many days per week do you miss taking your medication? 0      Review of Systems   Constitutional, HEENT, cardiovascular, pulmonary, gi and gu systems are negative, except as otherwise noted.      Objective    /72 (BP Location: Left arm, Patient Position: Sitting, Cuff Size: Adult Large)   Pulse 81   Temp 97.7  F (36.5  C) (Tympanic)   Ht 1.66 m (5' 5.35\")   Wt 90.7 kg (200 lb)   LMP 10/02/2018 (Approximate)   SpO2 97%   BMI 32.93 kg/m    Body mass index is 32.93 kg/m .  Physical Exam   GENERAL: alert, no distress and over weight  NECK: no adenopathy, no asymmetry, masses, or scars and thyroid normal to palpation  RESP: " lungs clear to auscultation - no rales, rhonchi or wheezes  CV: regular rate and rhythm, normal S1 S2, no S3 or S4, no murmur, click or rub, no peripheral edema and peripheral pulses strong  MS: no gross musculoskeletal defects noted, no edema  PSYCH: mentation appears normal, affect normal/bright    No results found for this or any previous visit (from the past 24 hour(s)).

## 2021-05-20 RX ORDER — PHENTERMINE HYDROCHLORIDE 30 MG/1
CAPSULE ORAL
Qty: 30 CAPSULE | Refills: 1 | Status: SHIPPED | OUTPATIENT
Start: 2021-05-20 | End: 2021-05-26

## 2021-05-26 ENCOUNTER — OFFICE VISIT (OUTPATIENT)
Dept: FAMILY MEDICINE | Facility: OTHER | Age: 56
End: 2021-05-26
Attending: FAMILY MEDICINE
Payer: COMMERCIAL

## 2021-05-26 VITALS
TEMPERATURE: 97.7 F | OXYGEN SATURATION: 97 % | HEIGHT: 65 IN | SYSTOLIC BLOOD PRESSURE: 128 MMHG | DIASTOLIC BLOOD PRESSURE: 72 MMHG | BODY MASS INDEX: 33.32 KG/M2 | WEIGHT: 200 LBS | HEART RATE: 81 BPM

## 2021-05-26 DIAGNOSIS — Z12.31 ENCOUNTER FOR SCREENING MAMMOGRAM FOR BREAST CANCER: ICD-10-CM

## 2021-05-26 DIAGNOSIS — E78.5 HYPERLIPIDEMIA, UNSPECIFIED HYPERLIPIDEMIA TYPE: ICD-10-CM

## 2021-05-26 DIAGNOSIS — E87.6 HYPOKALEMIA: ICD-10-CM

## 2021-05-26 DIAGNOSIS — Z12.11 SPECIAL SCREENING FOR MALIGNANT NEOPLASMS, COLON: ICD-10-CM

## 2021-05-26 DIAGNOSIS — I10 ESSENTIAL HYPERTENSION: ICD-10-CM

## 2021-05-26 DIAGNOSIS — Z00.00 ENCOUNTER FOR MEDICAL EXAMINATION TO ESTABLISH CARE: Primary | ICD-10-CM

## 2021-05-26 LAB
ALBUMIN SERPL-MCNC: 4 G/DL (ref 3.4–5)
ALP SERPL-CCNC: 90 U/L (ref 40–150)
ALT SERPL W P-5'-P-CCNC: 93 U/L (ref 0–50)
ANION GAP SERPL CALCULATED.3IONS-SCNC: 7 MMOL/L (ref 3–14)
AST SERPL W P-5'-P-CCNC: 27 U/L (ref 0–45)
BASOPHILS # BLD AUTO: 0.1 10E9/L (ref 0–0.2)
BASOPHILS NFR BLD AUTO: 2 %
BILIRUB SERPL-MCNC: 0.4 MG/DL (ref 0.2–1.3)
BUN SERPL-MCNC: 27 MG/DL (ref 7–30)
CALCIUM SERPL-MCNC: 9.5 MG/DL (ref 8.5–10.1)
CHLORIDE SERPL-SCNC: 107 MMOL/L (ref 94–109)
CHOLEST SERPL-MCNC: 187 MG/DL
CO2 SERPL-SCNC: 28 MMOL/L (ref 20–32)
CREAT SERPL-MCNC: 0.76 MG/DL (ref 0.52–1.04)
DIFFERENTIAL METHOD BLD: ABNORMAL
EOSINOPHIL # BLD AUTO: 0.5 10E9/L (ref 0–0.7)
EOSINOPHIL NFR BLD AUTO: 7 %
ERYTHROCYTE [DISTWIDTH] IN BLOOD BY AUTOMATED COUNT: 13.9 % (ref 10–15)
GFR SERPL CREATININE-BSD FRML MDRD: 89 ML/MIN/{1.73_M2}
GLUCOSE SERPL-MCNC: 99 MG/DL (ref 70–99)
HCT VFR BLD AUTO: 47.9 % (ref 35–47)
HDLC SERPL-MCNC: 64 MG/DL
HGB BLD-MCNC: 16 G/DL (ref 11.7–15.7)
LDLC SERPL CALC-MCNC: 99 MG/DL
LYMPHOCYTES # BLD AUTO: 2 10E9/L (ref 0.8–5.3)
LYMPHOCYTES NFR BLD AUTO: 31 %
MCH RBC QN AUTO: 28.4 PG (ref 26.5–33)
MCHC RBC AUTO-ENTMCNC: 33.4 G/DL (ref 31.5–36.5)
MCV RBC AUTO: 85 FL (ref 78–100)
MONOCYTES # BLD AUTO: 0.5 10E9/L (ref 0–1.3)
MONOCYTES NFR BLD AUTO: 8 %
NEUTROPHILS # BLD AUTO: 3.5 10E9/L (ref 1.6–8.3)
NEUTROPHILS NFR BLD AUTO: 52 %
NONHDLC SERPL-MCNC: 123 MG/DL
PLATELET # BLD AUTO: 356 10E9/L (ref 150–450)
POTASSIUM SERPL-SCNC: 3.6 MMOL/L (ref 3.4–5.3)
PROT SERPL-MCNC: 7.7 G/DL (ref 6.8–8.8)
RBC # BLD AUTO: 5.63 10E12/L (ref 3.8–5.2)
SODIUM SERPL-SCNC: 142 MMOL/L (ref 133–144)
TRIGL SERPL-MCNC: 122 MG/DL
TSH SERPL DL<=0.005 MIU/L-ACNC: 1.36 MU/L (ref 0.4–4)
WBC # BLD AUTO: 6.6 10E9/L (ref 4–11)

## 2021-05-26 PROCEDURE — 80050 GENERAL HEALTH PANEL: CPT | Performed by: FAMILY MEDICINE

## 2021-05-26 PROCEDURE — 90750 HZV VACC RECOMBINANT IM: CPT | Performed by: FAMILY MEDICINE

## 2021-05-26 PROCEDURE — 80061 LIPID PANEL: CPT | Performed by: FAMILY MEDICINE

## 2021-05-26 PROCEDURE — 36415 COLL VENOUS BLD VENIPUNCTURE: CPT | Performed by: FAMILY MEDICINE

## 2021-05-26 PROCEDURE — 90471 IMMUNIZATION ADMIN: CPT | Performed by: FAMILY MEDICINE

## 2021-05-26 PROCEDURE — 99214 OFFICE O/P EST MOD 30 MIN: CPT | Mod: 25 | Performed by: FAMILY MEDICINE

## 2021-05-26 ASSESSMENT — PAIN SCALES - GENERAL: PAINLEVEL: MILD PAIN (2)

## 2021-05-26 ASSESSMENT — ANXIETY QUESTIONNAIRES
2. NOT BEING ABLE TO STOP OR CONTROL WORRYING: NOT AT ALL
5. BEING SO RESTLESS THAT IT IS HARD TO SIT STILL: NOT AT ALL
7. FEELING AFRAID AS IF SOMETHING AWFUL MIGHT HAPPEN: NOT AT ALL
4. TROUBLE RELAXING: NOT AT ALL
1. FEELING NERVOUS, ANXIOUS, OR ON EDGE: NOT AT ALL
GAD7 TOTAL SCORE: 0
3. WORRYING TOO MUCH ABOUT DIFFERENT THINGS: NOT AT ALL
6. BECOMING EASILY ANNOYED OR IRRITABLE: NOT AT ALL

## 2021-05-26 ASSESSMENT — MIFFLIN-ST. JEOR: SCORE: 1513.62

## 2021-05-26 ASSESSMENT — PATIENT HEALTH QUESTIONNAIRE - PHQ9: SUM OF ALL RESPONSES TO PHQ QUESTIONS 1-9: 0

## 2021-05-26 NOTE — NURSING NOTE
"Chief Complaint   Patient presents with     Establish Care     Hypertension     Lipids       Initial /72 (BP Location: Left arm, Patient Position: Sitting, Cuff Size: Adult Large)   Pulse 81   Temp 97.7  F (36.5  C) (Tympanic)   Ht 1.66 m (5' 5.35\")   Wt 90.7 kg (200 lb)   LMP 10/02/2018 (Approximate)   SpO2 97%   BMI 32.93 kg/m   Estimated body mass index is 32.93 kg/m  as calculated from the following:    Height as of this encounter: 1.66 m (5' 5.35\").    Weight as of this encounter: 90.7 kg (200 lb).  Medication Reconciliation: complete  Sandy Mckinney LPN  "

## 2021-05-26 NOTE — PATIENT INSTRUCTIONS
[facility] - for weight management.  Will put referral in place.  Labs today - will call with results.  Mammogram ordered.  Cologuard kit ordered.  Shingrix vaccine given.  Next due 2-6 months.    Schedule physical - pap/pelvic, etc.

## 2021-05-27 ASSESSMENT — ANXIETY QUESTIONNAIRES: GAD7 TOTAL SCORE: 0

## 2021-06-07 ENCOUNTER — TELEPHONE (OUTPATIENT)
Dept: MAMMOGRAPHY | Facility: OTHER | Age: 56
End: 2021-06-07

## 2021-06-07 ENCOUNTER — ANCILLARY PROCEDURE (OUTPATIENT)
Dept: MAMMOGRAPHY | Facility: OTHER | Age: 56
End: 2021-06-07
Attending: FAMILY MEDICINE
Payer: COMMERCIAL

## 2021-06-07 DIAGNOSIS — Z12.31 ENCOUNTER FOR SCREENING MAMMOGRAM FOR BREAST CANCER: ICD-10-CM

## 2021-06-07 PROCEDURE — 77067 SCR MAMMO BI INCL CAD: CPT | Mod: TC | Performed by: RADIOLOGY

## 2021-06-07 PROCEDURE — 77063 BREAST TOMOSYNTHESIS BI: CPT | Mod: TC | Performed by: RADIOLOGY

## 2021-06-11 LAB — COLOGUARD INSUFFICIENT SPECIMEN: NORMAL

## 2021-07-25 DIAGNOSIS — I10 BENIGN ESSENTIAL HYPERTENSION: ICD-10-CM

## 2021-07-27 RX ORDER — CHLORTHALIDONE 25 MG/1
TABLET ORAL
Qty: 90 TABLET | Refills: 0 | Status: SHIPPED | OUTPATIENT
Start: 2021-07-27 | End: 2021-10-13

## 2021-09-23 ENCOUNTER — OFFICE VISIT (OUTPATIENT)
Dept: FAMILY MEDICINE | Facility: OTHER | Age: 56
End: 2021-09-23
Attending: NURSE PRACTITIONER
Payer: COMMERCIAL

## 2021-09-23 VITALS
OXYGEN SATURATION: 98 % | WEIGHT: 205 LBS | SYSTOLIC BLOOD PRESSURE: 116 MMHG | HEART RATE: 64 BPM | BODY MASS INDEX: 32.95 KG/M2 | DIASTOLIC BLOOD PRESSURE: 72 MMHG | TEMPERATURE: 98 F | HEIGHT: 66 IN | RESPIRATION RATE: 16 BRPM

## 2021-09-23 DIAGNOSIS — N76.0 BV (BACTERIAL VAGINOSIS): Primary | ICD-10-CM

## 2021-09-23 DIAGNOSIS — Z11.3 ROUTINE SCREENING FOR STI (SEXUALLY TRANSMITTED INFECTION): ICD-10-CM

## 2021-09-23 DIAGNOSIS — N89.8 VAGINAL DISCHARGE: ICD-10-CM

## 2021-09-23 DIAGNOSIS — B96.89 BV (BACTERIAL VAGINOSIS): Primary | ICD-10-CM

## 2021-09-23 DIAGNOSIS — Z12.4 SCREENING FOR MALIGNANT NEOPLASM OF CERVIX: ICD-10-CM

## 2021-09-23 DIAGNOSIS — N90.9 LESION OF FEMALE PERINEUM: ICD-10-CM

## 2021-09-23 LAB
ALBUMIN UR-MCNC: NEGATIVE MG/DL
APPEARANCE UR: CLEAR
BILIRUB UR QL STRIP: NEGATIVE
CLUE CELLS: ABNORMAL
COLOR UR AUTO: YELLOW
GLUCOSE UR STRIP-MCNC: NEGATIVE MG/DL
HGB UR QL STRIP: ABNORMAL
KETONES UR STRIP-MCNC: NEGATIVE MG/DL
LEUKOCYTE ESTERASE UR QL STRIP: ABNORMAL
NITRATE UR QL: NEGATIVE
PH UR STRIP: 6.5 [PH] (ref 5–7)
RBC #/AREA URNS AUTO: NORMAL /HPF
SP GR UR STRIP: 1.01 (ref 1–1.03)
TRICHOMONAS, WET PREP: ABNORMAL
UROBILINOGEN UR STRIP-ACNC: 0.2 E.U./DL
WBC #/AREA URNS AUTO: NORMAL /HPF
WBC'S/HIGH POWER FIELD, WET PREP: ABNORMAL
YEAST, WET PREP: ABNORMAL

## 2021-09-23 PROCEDURE — 87491 CHLMYD TRACH DNA AMP PROBE: CPT | Performed by: NURSE PRACTITIONER

## 2021-09-23 PROCEDURE — G0145 SCR C/V CYTO,THINLAYER,RESCR: HCPCS | Performed by: NURSE PRACTITIONER

## 2021-09-23 PROCEDURE — 36415 COLL VENOUS BLD VENIPUNCTURE: CPT | Performed by: NURSE PRACTITIONER

## 2021-09-23 PROCEDURE — G0124 SCREEN C/V THIN LAYER BY MD: HCPCS | Performed by: PATHOLOGY

## 2021-09-23 PROCEDURE — 87591 N.GONORRHOEAE DNA AMP PROB: CPT | Performed by: NURSE PRACTITIONER

## 2021-09-23 PROCEDURE — 87210 SMEAR WET MOUNT SALINE/INK: CPT | Performed by: NURSE PRACTITIONER

## 2021-09-23 PROCEDURE — 81001 URINALYSIS AUTO W/SCOPE: CPT | Performed by: NURSE PRACTITIONER

## 2021-09-23 PROCEDURE — 87624 HPV HI-RISK TYP POOLED RSLT: CPT | Performed by: NURSE PRACTITIONER

## 2021-09-23 PROCEDURE — 86780 TREPONEMA PALLIDUM: CPT | Performed by: NURSE PRACTITIONER

## 2021-09-23 PROCEDURE — 99214 OFFICE O/P EST MOD 30 MIN: CPT | Performed by: NURSE PRACTITIONER

## 2021-09-23 RX ORDER — METRONIDAZOLE 500 MG/1
500 TABLET ORAL 2 TIMES DAILY
Qty: 14 TABLET | Refills: 0 | Status: SHIPPED | OUTPATIENT
Start: 2021-09-23 | End: 2021-09-30

## 2021-09-23 RX ORDER — VALACYCLOVIR HYDROCHLORIDE 1 G/1
1000 TABLET, FILM COATED ORAL 2 TIMES DAILY
Qty: 20 TABLET | Refills: 0 | Status: SHIPPED | OUTPATIENT
Start: 2021-09-23 | End: 2021-10-07

## 2021-09-23 RX ORDER — PHENTERMINE HYDROCHLORIDE 30 MG/1
CAPSULE ORAL
COMMUNITY
Start: 2021-09-08 | End: 2021-09-23

## 2021-09-23 ASSESSMENT — MIFFLIN-ST. JEOR: SCORE: 1538.68

## 2021-09-23 ASSESSMENT — PAIN SCALES - GENERAL: PAINLEVEL: NO PAIN (0)

## 2021-09-23 NOTE — PATIENT INSTRUCTIONS
Patient Education     Bacterial Vaginosis    You have a vaginal infection called bacterial vaginosis (BV). Both good and bad bacteria are present in a healthy vagina. BV occurs when these bacteria get out of balance. The number of bad bacteria increase. And the number of good bacteria decrease. BV is linked with sexual activity, but it's not a sexually transmitted infection (STI).   BV may or may not cause symptoms. If symptoms do occur, they can include:     Thin, gray, milky-white, or sometimes green discharge    Unpleasant odor or  fishy  smell    Itching, burning, or pain in or around the vagina  It is not known what causes BV, but certain factors can make the problem more likely. These can include:     Douching    Spermicides    Use of antibiotics    Change in hormone levels with pregnancy, breastfeeding, or menopause    Having sex with a new partner    Having sex with more than one partner  BV will sometimes go away on its own. But treatment is often advised. This is because untreated BV can raise the risk of more serious health problems such as:     Pelvic inflammatory disease (PID)     delivery (giving birth to a baby early if you re pregnant)    HIV and some other sexually transmitted infections (STIs)    Infection after surgery on the reproductive organs  Home care  General care    BV is most often treated with medicines called antibiotics. These may be given as pills or as a vaginal cream. If antibiotics are prescribed, be sure to use them exactly as directed. And complete all of the medicine, even if your symptoms go away.    Don't douche or having sex during treatment.    If you have sex with a female partner, ask your healthcare provider if she should also be treated.  Prevention    Don't douche.    Don't have sex. If you do have sex, then take steps to lower your risk:  ? Use condoms when having sex.  ? Limit the number of sex partners you have.    Follow-up care  Follow up with your  healthcare provider, or as advised.   When to get medical advice  Call your healthcare provider right away if:     You have a fever of 100.4 F (38 C) or higher, or as directed by your provider.    Your symptoms get worse, or they don t go away within a few days of starting treatment.    You have new pain in the lower belly or pelvic region.    You have side effects that bother you or a reaction to the pills or cream you re prescribed.    You or any of your sex partners have new symptoms, such as a rash, joint pain, or sores.  [employer] last reviewed this educational content on 6/1/2020 2000-2021 [employer]. All rights reserved. This information is not intended as a substitute for professional medical care. Always follow your healthcare professional's instructions.           Patient Education     Genital Herpes    Genital herpes is a common sexually transmitted infection (STI). It is caused by the herpes simplex virus (HSV). One out of 5 teens and adults carry the herpes virus. During an outbreak, it causes small blisters that break open, leaving small, painful sores in the genital area. Eventually, scabs form and the sores heal. In women, these show up most often on the skin just outside the vaginal opening. They can occur on the buttocks, anus, or cervix. In men, the sores are usually on the tip, sides, or base of the penis. They also occur on the scrotum, buttocks, or thighs.  The first outbreak begins within 2 to 3 weeks after exposure to an infected sexual partner. It may last 1 to 3 weeks. It may cause headache, muscle ache, and fevers. The first outbreak is usually the worst. Because the virus remains in the body even after the sores heal, most people will have more outbreaks. The frequency of outbreaks is different for each person. Some people will never have another outbreak. Others will have several episodes a year. Later outbreaks are usually shorter, milder, and less painful. For many,  the number of outbreaks tends to decrease over time. Various factors may trigger an outbreak. These include:    Emotional stress    Menstruation    Presence of another illness (cold, flu, or fever from any cause)    Overexertion and fatigue    Weak immune system  Home care    It is very important that you do not have sexual relations until all the herpes sores have healed completely.    Wash the affected area gently with mild soap and water. Wash your hands after touching the affected area.    You may use over-the-counter pain medicine unless another pain medicine was prescribed. If you have chronic liver or kidney disease or have ever had a stomach ulcer or gastrointestinal bleeding, talk with your healthcare provider before using these medicines. Also talk to your provider if you are taking medicine to prevent blood clots. Aspirin should never be given to anyone younger than 18 years of age who is ill with a viral infection or fever. It may cause severe liver or brain damage.    Your healthcare provider may prescribe antiviral medicine during the first outbreak. This will help the sores heal faster. Antiviral medicine may also be prescribed so that you have it ready to take at the first sign of another outbreak. This will help the symptoms go away sooner. For people with frequent outbreaks, daily preventive therapy may be prescribed. This will help reduce the frequency of attacks. Daily preventive therapy may also reduce risk of spread of herpes to your sexual partner. Discuss the risks and benefits of daily therapy with your healthcare provider.    If you are a woman who is pregnant now or may become pregnant in the future, let your healthcare provider know that you have had herpes. This may affect the way your baby is delivered.  Preventing spread to others  The virus is spread by sexual contact with someone who has the herpes virus. The risk of spread is highest when the sores are present. However, there is a  chance of spreading the virus even when sores are not visible. Inform future sexual partners that you have herpes and that they may become infected. To reduce the risk of passing the virus to a partner who has never had herpes, avoid sexual relations at the first sign of an outbreak and until the sores are fully healed. Latex barriers, such as condoms, reduce the risk of spread between outbreaks if the infected site is covered, but they do not guarantee protection.  Follow-up care  Follow up with your healthcare provider, or as advised.  People who have just learned that they have herpes may feel upset. Getting the facts about herpes can help you feel more in control. Follow up with your healthcare provider or the public health department for complete STI screening, including HIV testing.  When to seek medical advice  Call your healthcare provider right away if any of these occur:    Inability to urinate due to pain    Swelling or increasing redness in the genital area    Discharge from the vagina or penis    Increasing back or abdominal pain    Rash or joint pain  Call 911  Call 911 or get immediate medical care if any of these occur:    Unusual drowsiness, weakness, or confusion    Worsening headache or stiff neck  [employer] last reviewed this educational content on 6/1/2018 2000-2021 [employer]. All rights reserved. This information is not intended as a substitute for professional medical care. Always follow your healthcare professional's instructions.           Patient Education     Bacterial Vaginosis    You have a vaginal infection called bacterial vaginosis (BV). Both good and bad bacteria are present in a healthy vagina. BV occurs when these bacteria get out of balance. The number of bad bacteria increase. And the number of good bacteria decrease. BV is linked with sexual activity, but it's not a sexually transmitted infection (STI).   BV may or may not cause symptoms. If symptoms do occur,  they can include:     Thin, gray, milky-white, or sometimes green discharge    Unpleasant odor or  fishy  smell    Itching, burning, or pain in or around the vagina  It is not known what causes BV, but certain factors can make the problem more likely. These can include:     Douching    Spermicides    Use of antibiotics    Change in hormone levels with pregnancy, breastfeeding, or menopause    Having sex with a new partner    Having sex with more than one partner  BV will sometimes go away on its own. But treatment is often advised. This is because untreated BV can raise the risk of more serious health problems such as:     Pelvic inflammatory disease (PID)     delivery (giving birth to a baby early if you re pregnant)    HIV and some other sexually transmitted infections (STIs)    Infection after surgery on the reproductive organs  Home care  General care    BV is most often treated with medicines called antibiotics. These may be given as pills or as a vaginal cream. If antibiotics are prescribed, be sure to use them exactly as directed. And complete all of the medicine, even if your symptoms go away.    Don't douche or having sex during treatment.    If you have sex with a female partner, ask your healthcare provider if she should also be treated.  Prevention    Don't douche.    Don't have sex. If you do have sex, then take steps to lower your risk:  ? Use condoms when having sex.  ? Limit the number of sex partners you have.    Follow-up care  Follow up with your healthcare provider, or as advised.   When to get medical advice  Call your healthcare provider right away if:     You have a fever of 100.4 F (38 C) or higher, or as directed by your provider.    Your symptoms get worse, or they don t go away within a few days of starting treatment.    You have new pain in the lower belly or pelvic region.    You have side effects that bother you or a reaction to the pills or cream you re prescribed.    You or any  of your sex partners have new symptoms, such as a rash, joint pain, or sores.  [employer] last reviewed this educational content on 6/1/2020 2000-2021 [employer]. All rights reserved. This information is not intended as a substitute for professional medical care. Always follow your healthcare professional's instructions.

## 2021-09-23 NOTE — PROGRESS NOTES
Assessment & Plan     1. BV (bacterial vaginosis)  - metroNIDAZOLE (FLAGYL) 500 MG tablet; Take 1 tablet (500 mg) by mouth 2 times daily for 7 days  Dispense: 14 tablet; Refill: 0    2. Lesion of female perineum  Differential includes herpes vs folliculitis. I have offered Tracy testing for herpes and explained that if it is indeed herpes, then we treat with antiviral. They appear more viral than bacterial and have recommended a trial of valacyclovir.   - valACYclovir (VALTREX) 1000 mg tablet; Take 1 tablet (1,000 mg) by mouth 2 times daily for 10 days  Dispense: 20 tablet; Refill: 0    3. Screening for malignant neoplasm of cervix  - A pap thin layer screen with  HPV - recommended age 30 - 65 years; Future  - A pap thin layer screen with  HPV - recommended age 30 - 65 years  - HPV Hold (Lab Only)    4. Vaginal discharge  - *UA reflex to Microscopic and Culture - [facility]/[place]; Future  - Wet prep  - *UA reflex to Microscopic and Culture - [facility]/[place]  - Urine Microscopic    5. Routine screening for STI (sexually transmitted infection)  - Treponema Abs w Reflex to RPR and Titer; Future  - GC/Chlamydia by PCR - [facility]  - Treponema Abs w Reflex to RPR and Titer    Ordering of each unique test  Prescription drug management       No follow-ups on file.    Ruth Ann Jones, [facility] - [facility]    Subjective   Tracy is a 55 year old who presents for the following health issues     HPI     Vaginal Symptoms  Onset/Duration: 3-4 days  Description:  Vaginal Discharge: white   Itching (Pruritis): YES  Burning sensation:  YES  Odor: unsure  Accompanying Signs & Symptoms:  Urinary symptoms: no  Abdominal pain: no  Fever: no  History:   Sexually active: YES  New Partner: YES  Possibility of Pregnancy:  no  Recent antibiotic use: no  Previous vaginitis issues: no  Precipitating or alleviating factors: None  Therapies tried and outcome: none  Patient also reports white bumps.        LMP: 4 years ago.  "    Would like to discuss terbinafine  & contrave        Review of Systems   Constitutional, HEENT, cardiovascular, pulmonary, gi and gu systems are negative, except as otherwise noted.      Objective    /72 (BP Location: Right arm, Patient Position: Sitting, Cuff Size: Adult Large)   Pulse 64   Temp 98  F (36.7  C) (Tympanic)   Resp 16   Ht 1.664 m (5' 5.5\")   Wt 93 kg (205 lb)   LMP 10/02/2018 (Approximate)   SpO2 98%   BMI 33.59 kg/m    Body mass index is 33.59 kg/m .  Physical Exam   GENERAL: healthy, alert and no distress   (female): Externally located vesicular and pustular lesions with erythemic bases <0.5 cm diameter from introitus to rectum. Unable to get adequate sample to culture as non are currently open. No drainage.  Normal urethral meatus , vaginal mucosa pink, moist, well rugated, vaginal discharge - moderate, white, yellow, thin and malodorous. Normal cervix, adnexae, and uterus without masses. No cervical motion tenderness.     Labs pending        "

## 2021-09-24 ENCOUNTER — TELEPHONE (OUTPATIENT)
Dept: FAMILY MEDICINE | Facility: OTHER | Age: 56
End: 2021-09-24

## 2021-09-24 ENCOUNTER — MYC MEDICAL ADVICE (OUTPATIENT)
Dept: FAMILY MEDICINE | Facility: OTHER | Age: 56
End: 2021-09-24

## 2021-09-24 NOTE — TELEPHONE ENCOUNTER
11:24 AM    Reason for Call: Phone Call    Description: Patient called and states that she has questions regarding the Rx she was given for an infection. Patient does not agree with the diagnosis she was given and would like to know if this Rx is going to affect her if she does not have the condition it is used to treat. Please call patient back for further discussion.    Was an appointment offered for this call? No  If yes : Appointment type              Date    Preferred method for responding to this message: Telephone Call  What is your phone number ? [phone]    If we cannot reach you directly, may we leave a detailed response at the number you provided? Yes    Can this message wait until your PCP/provider returns, if available today? Not applicable, provider is in today    Leona Jurado

## 2021-09-24 NOTE — TELEPHONE ENCOUNTER
Called and spoke with Tracy. Reassured her that the valacyclovir is commonly prescribed and well tolerated.We discussed that my concern for possible herpes is not a firm diagnosis as the lesions are not 100% classic to herpes. If she would like to hold off on taking this, that is fine. She should, however, take the flagyl as BV was evident. She is encouraged to keep her appointment with her primary, Dr. Hassan in early Oct. She thanked me for the call and will follow up with her PCP. Labs still pending.

## 2021-09-25 LAB
C TRACH DNA SPEC QL PROBE+SIG AMP: NEGATIVE
N GONORRHOEA DNA SPEC QL NAA+PROBE: NEGATIVE
T PALLIDUM AB SER QL: NONREACTIVE

## 2021-09-26 ENCOUNTER — HEALTH MAINTENANCE LETTER (OUTPATIENT)
Age: 56
End: 2021-09-26

## 2021-09-30 NOTE — PATIENT INSTRUCTIONS
Will call with lab results.  Return new cologuard kit.  Trial of antifungal for toenail infection - 1 week per month of medication.  Pap abnormal, HPV negative - repeat cotesting in 1 year.        Preventive Health Recommendations  Female Ages 50 - 64    Yearly exam: See your health care provider every year in order to  o Review health changes.   o Discuss preventive care.    o Review your medicines if your doctor has prescribed any.      Get a Pap test every three years (unless you have an abnormal result and your provider advises testing more often).    If you get Pap tests with HPV test, you only need to test every 5 years, unless you have an abnormal result.     You do not need a Pap test if your uterus was removed (hysterectomy) and you have not had cancer.    You should be tested each year for STDs (sexually transmitted diseases) if you're at risk.     Have a mammogram every 1 to 2 years.    Have a colonoscopy at age 50, or have a yearly FIT test (stool test). These exams screen for colon cancer.      Have a cholesterol test every 5 years, or more often if advised.    Have a diabetes test (fasting glucose) every three years. If you are at risk for diabetes, you should have this test more often.     If you are at risk for osteoporosis (brittle bone disease), think about having a bone density scan (DEXA).    Shots: Get a flu shot each year. Get a tetanus shot every 10 years.    Nutrition:     Eat at least 5 servings of fruits and vegetables each day.    Eat whole-grain bread, whole-wheat pasta and brown rice instead of white grains and rice.    Get adequate Calcium and Vitamin D.     Lifestyle    Exercise at least 150 minutes a week (30 minutes a day, 5 days a week). This will help you control your weight and prevent disease.    Limit alcohol to one drink per day.    No smoking.     Wear sunscreen to prevent skin cancer.     See your dentist every six months for an exam and cleaning.    See your eye doctor every  1 to 2 years.    Patient Education     Genital Herpes    Genital herpes is a common sexually transmitted infection (STI). It is caused by the herpes simplex virus (HSV). One out of 5 teens and adults carry the herpes virus. During an outbreak, it causes small blisters that break open, leaving small, painful sores in the genital area. Eventually, scabs form and the sores heal. In women, these show up most often on the skin just outside the vaginal opening. They can occur on the buttocks, anus, or cervix. In men, the sores are usually on the tip, sides, or base of the penis. They also occur on the scrotum, buttocks, or thighs.  The first outbreak begins within 2 to 3 weeks after exposure to an infected sexual partner. It may last 1 to 3 weeks. It may cause headache, muscle ache, and fevers. The first outbreak is usually the worst. Because the virus remains in the body even after the sores heal, most people will have more outbreaks. The frequency of outbreaks is different for each person. Some people will never have another outbreak. Others will have several episodes a year. Later outbreaks are usually shorter, milder, and less painful. For many, the number of outbreaks tends to decrease over time. Various factors may trigger an outbreak. These include:    Emotional stress    Menstruation    Presence of another illness (cold, flu, or fever from any cause)    Overexertion and fatigue    Weak immune system  Home care    It is very important that you do not have sexual relations until all the herpes sores have healed completely.    Wash the affected area gently with mild soap and water. Wash your hands after touching the affected area.    You may use over-the-counter pain medicine unless another pain medicine was prescribed. If you have chronic liver or kidney disease or have ever had a stomach ulcer or gastrointestinal bleeding, talk with your healthcare provider before using these medicines. Also talk to your provider  if you are taking medicine to prevent blood clots. Aspirin should never be given to anyone younger than 18 years of age who is ill with a viral infection or fever. It may cause severe liver or brain damage.    Your healthcare provider may prescribe antiviral medicine during the first outbreak. This will help the sores heal faster. Antiviral medicine may also be prescribed so that you have it ready to take at the first sign of another outbreak. This will help the symptoms go away sooner. For people with frequent outbreaks, daily preventive therapy may be prescribed. This will help reduce the frequency of attacks. Daily preventive therapy may also reduce risk of spread of herpes to your sexual partner. Discuss the risks and benefits of daily therapy with your healthcare provider.    If you are a woman who is pregnant now or may become pregnant in the future, let your healthcare provider know that you have had herpes. This may affect the way your baby is delivered.  Preventing spread to others  The virus is spread by sexual contact with someone who has the herpes virus. The risk of spread is highest when the sores are present. However, there is a chance of spreading the virus even when sores are not visible. Inform future sexual partners that you have herpes and that they may become infected. To reduce the risk of passing the virus to a partner who has never had herpes, avoid sexual relations at the first sign of an outbreak and until the sores are fully healed. Latex barriers, such as condoms, reduce the risk of spread between outbreaks if the infected site is covered, but they do not guarantee protection.  Follow-up care  Follow up with your healthcare provider, or as advised.  People who have just learned that they have herpes may feel upset. Getting the facts about herpes can help you feel more in control. Follow up with your healthcare provider or the public health department for complete STI screening, including  HIV testing.  When to seek medical advice  Call your healthcare provider right away if any of these occur:    Inability to urinate due to pain    Swelling or increasing redness in the genital area    Discharge from the vagina or penis    Increasing back or abdominal pain    Rash or joint pain  Call 911  Call 911 or get immediate medical care if any of these occur:    Unusual drowsiness, weakness, or confusion    Worsening headache or stiff neck  [employer] last reviewed this educational content on 6/1/2018 2000-2021 [employer]. All rights reserved. This information is not intended as a substitute for professional medical care. Always follow your healthcare professional's instructions.           Patient Education     Herpes Simplex Virus Antibody  Does this test have other names?  HSV-1 antibodies, HSV-2 antibodies  What is this test?  The herpes simplex virus antibodies test is a blood test that screens for the herpes simplex virus (HSV). Culturing a sample from an active outbreak of HSV is the best method to diagnose a current infection. But the herpes simplex virus antibodies test can help identify the recurrence of a previous infection.   Why do I need this test?  You may need this test if you believe you may have herpes, but you don't have an active infection. For instance, if you have had sex with a partner who has herpes infection.   You may also have this test if you have HIV, or are pregnant or hope to become pregnant. The herpes simplex virus antibodies test screens for current or previous HSV infections.   In some cases, the HSV antibodies test can be used to diagnose an active HSV infection. But more often, a herpes culture is used.   The antibodies test is valuable because many early herpes infections show no symptoms. If symptoms do happen, they can include soreness, as well as pain or burning at the site of the infection. This often happens before the outbreak of sores. You may also  have headache, fever, body aches, or pain.   What other tests might I have along with this test?  If you have an active herpes infection, you may also need a physical exam so your healthcare provider can visually inspect the sores. Your provider may collect a sample from the sores to culture in a lab.   What do my test results mean?  Test results may vary depending on your age, gender, health history, the method used for the test, and other things. Your test results may not mean you have a problem. Ask your healthcare provider what your test results mean for you.    If your test result is positive, it can mean that you have an active herpes infection without symptoms. It can also mean that you had an HSV infection in the past. The antibody blood test is not as reliable as culturing a sample from a herpes sore. But in a herpes infection without symptoms, it can be a useful method for finding out if you have an infection.   How is this test done?  The test is done with a blood sample. A needle is used to draw blood from a vein in your arm or hand.    Does this test pose any risks?  Having a blood test with a needle carries some risks. These include bleeding, infection, bruising, and feeling lightheaded. When the needle pricks your arm or hand, you may feel a slight sting or pain. Afterward, the site may be sore.    What might affect my test results?  An antibody test for HSV is not as reliable as culturing a sample from an active herpes outbreak because the results are not always easy to interpret. A positive test result can mean you have an active infection, or simply that you were exposed to the virus at some point in the past.   How do I get ready for this test?  You don't need to get ready for this test. Be sure your healthcare provider knows about all medicines, herbs, vitamins, and supplements you are taking. This includes medicines that don't need a prescription and any illegal drugs you may use.   [employer]  last reviewed this educational content on 9/1/2020 2000-2021 [employer]. All rights reserved. This information is not intended as a substitute for professional medical care. Always follow your healthcare professional's instructions.

## 2021-10-01 ENCOUNTER — MYC MEDICAL ADVICE (OUTPATIENT)
Dept: FAMILY MEDICINE | Facility: OTHER | Age: 56
End: 2021-10-01

## 2021-10-01 LAB
BKR LAB AP GYN ADEQUACY: ABNORMAL
BKR LAB AP GYN INTERPRETATION: ABNORMAL
BKR LAB AP GYN OTHER FINDINGS: ABNORMAL
BKR LAB AP HPV REFLEX: ABNORMAL
BKR LAB AP PREVIOUS ABNORMAL: ABNORMAL
PATH REPORT.COMMENTS IMP SPEC: ABNORMAL
PATH REPORT.RELEVANT HX SPEC: ABNORMAL

## 2021-10-04 NOTE — TELEPHONE ENCOUNTER
Spoke with patient via phone. Discussed PAP results of ASCUS and evidence of herpes. I do not see a HPV result and have asked lab to look into this. She is aware that the next step of screening is dependent to some degree on the HPV result. She has a visit with her PCP and will fallow up this week.   Has finished flagyl and tolerated well. Did not take antiviral. Symptoms have resolved.

## 2021-10-06 LAB
HUMAN PAPILLOMA VIRUS 16 DNA: NEGATIVE
HUMAN PAPILLOMA VIRUS 18 DNA: NEGATIVE
HUMAN PAPILLOMA VIRUS FINAL DIAGNOSIS: NORMAL
HUMAN PAPILLOMA VIRUS OTHER HR: NEGATIVE

## 2021-10-07 ENCOUNTER — OFFICE VISIT (OUTPATIENT)
Dept: FAMILY MEDICINE | Facility: OTHER | Age: 56
End: 2021-10-07
Attending: FAMILY MEDICINE
Payer: COMMERCIAL

## 2021-10-07 VITALS
SYSTOLIC BLOOD PRESSURE: 118 MMHG | BODY MASS INDEX: 35.26 KG/M2 | DIASTOLIC BLOOD PRESSURE: 76 MMHG | HEART RATE: 64 BPM | TEMPERATURE: 97 F | WEIGHT: 199 LBS | OXYGEN SATURATION: 98 % | HEIGHT: 63 IN

## 2021-10-07 DIAGNOSIS — B35.1 ONYCHOMYCOSIS: ICD-10-CM

## 2021-10-07 DIAGNOSIS — N76.6 GENITAL ULCER, FEMALE: ICD-10-CM

## 2021-10-07 DIAGNOSIS — Z00.00 ROUTINE GENERAL MEDICAL EXAMINATION AT A HEALTH CARE FACILITY: Primary | ICD-10-CM

## 2021-10-07 DIAGNOSIS — Z23 NEED FOR VACCINATION: ICD-10-CM

## 2021-10-07 DIAGNOSIS — E78.5 HYPERLIPIDEMIA, UNSPECIFIED HYPERLIPIDEMIA TYPE: ICD-10-CM

## 2021-10-07 DIAGNOSIS — E87.6 HYPOKALEMIA: ICD-10-CM

## 2021-10-07 DIAGNOSIS — I10 ESSENTIAL HYPERTENSION: ICD-10-CM

## 2021-10-07 DIAGNOSIS — Z12.11 SPECIAL SCREENING FOR MALIGNANT NEOPLASMS, COLON: ICD-10-CM

## 2021-10-07 DIAGNOSIS — E66.811 CLASS 1 OBESITY WITH SERIOUS COMORBIDITY IN ADULT, UNSPECIFIED BMI, UNSPECIFIED OBESITY TYPE: ICD-10-CM

## 2021-10-07 LAB
ALBUMIN SERPL-MCNC: 3.7 G/DL (ref 3.4–5)
ALP SERPL-CCNC: 83 U/L (ref 40–150)
ALT SERPL W P-5'-P-CCNC: 155 U/L (ref 0–50)
AST SERPL W P-5'-P-CCNC: 33 U/L (ref 0–45)
BILIRUB DIRECT SERPL-MCNC: <0.1 MG/DL (ref 0–0.2)
BILIRUB SERPL-MCNC: 0.3 MG/DL (ref 0.2–1.3)
HOLD SPECIMEN: NORMAL
PROT SERPL-MCNC: 7.7 G/DL (ref 6.8–8.8)

## 2021-10-07 PROCEDURE — 99396 PREV VISIT EST AGE 40-64: CPT | Mod: 25 | Performed by: FAMILY MEDICINE

## 2021-10-07 PROCEDURE — 80076 HEPATIC FUNCTION PANEL: CPT | Performed by: FAMILY MEDICINE

## 2021-10-07 PROCEDURE — 86695 HERPES SIMPLEX TYPE 1 TEST: CPT | Performed by: FAMILY MEDICINE

## 2021-10-07 PROCEDURE — 36415 COLL VENOUS BLD VENIPUNCTURE: CPT | Performed by: FAMILY MEDICINE

## 2021-10-07 PROCEDURE — 90750 HZV VACC RECOMBINANT IM: CPT | Performed by: FAMILY MEDICINE

## 2021-10-07 PROCEDURE — 90471 IMMUNIZATION ADMIN: CPT | Performed by: FAMILY MEDICINE

## 2021-10-07 PROCEDURE — 86696 HERPES SIMPLEX TYPE 2 TEST: CPT | Performed by: FAMILY MEDICINE

## 2021-10-07 RX ORDER — ITRACONAZOLE 100 MG/1
200 CAPSULE ORAL 2 TIMES DAILY
Qty: 28 CAPSULE | Refills: 2 | Status: SHIPPED | OUTPATIENT
Start: 2021-10-07 | End: 2021-10-14

## 2021-10-07 ASSESSMENT — ANXIETY QUESTIONNAIRES
6. BECOMING EASILY ANNOYED OR IRRITABLE: NOT AT ALL
IF YOU CHECKED OFF ANY PROBLEMS ON THIS QUESTIONNAIRE, HOW DIFFICULT HAVE THESE PROBLEMS MADE IT FOR YOU TO DO YOUR WORK, TAKE CARE OF THINGS AT HOME, OR GET ALONG WITH OTHER PEOPLE: NOT DIFFICULT AT ALL
2. NOT BEING ABLE TO STOP OR CONTROL WORRYING: NOT AT ALL
3. WORRYING TOO MUCH ABOUT DIFFERENT THINGS: NOT AT ALL
1. FEELING NERVOUS, ANXIOUS, OR ON EDGE: NOT AT ALL
5. BEING SO RESTLESS THAT IT IS HARD TO SIT STILL: NOT AT ALL
GAD7 TOTAL SCORE: 0
7. FEELING AFRAID AS IF SOMETHING AWFUL MIGHT HAPPEN: NOT AT ALL

## 2021-10-07 ASSESSMENT — PATIENT HEALTH QUESTIONNAIRE - PHQ9
SUM OF ALL RESPONSES TO PHQ QUESTIONS 1-9: 0
5. POOR APPETITE OR OVEREATING: NOT AT ALL

## 2021-10-07 ASSESSMENT — PAIN SCALES - GENERAL: PAINLEVEL: NO PAIN (0)

## 2021-10-07 ASSESSMENT — MIFFLIN-ST. JEOR: SCORE: 1471.79

## 2021-10-07 NOTE — PROGRESS NOTES
SUBJECTIVE:   CC: Nadeen Robles is an 55 year old woman who presents for preventive health visit.       Patient has been advised of split billing requirements and indicates understanding: Yes  Healthy Habits:     Getting at least 3 servings of Calcium per day:  Yes    Bi-annual eye exam:  NO    Dental care twice a year:  NO    Sleep apnea or symptoms of sleep apnea:  None    Diet:  Regular (no restrictions)    Frequency of exercise:  None    Taking medications regularly:  Yes    Medication side effects:  None    PHQ-2 Total Score: 0    Colon screen - declined colonoscopy - cologuard ordered at last visit when establishing care - but didn't work - too much of a sample.  Needs to be redone.  New order placed.    shingrix - today  Flu- getting on Monday at work  Mammogram 6/2021.    Vaginal infection - after shaving - and hot tub use. BV.  Saw provider in [facility].  Concern for herpes.  Wet prep, pap/hpv done.  No open lesions to culture.  Pap - ASCUS, HPV negative - repeat co test in 1 year - but also noted cellular changes associated with herpes.    Patient does not feel this is possible.   20 years.  Now relationship x 5 months.  Partner noticed it.  Asymptomatic.  New change.      Pap 2017 - ASCUS - Dr Sky.  None since.    Hyperlipidemia Follow-Up    Are you regularly taking any medication or supplement to lower your cholesterol?   No    Are you having muscle aches or other side effects that you think could be caused by your cholesterol lowering medication?  No    Hypertension Follow-up    Do you check your blood pressure regularly outside of the clinic? No     Are you following a low salt diet? Yes    Are your blood pressures ever more than 140 on the top number (systolic) OR more   than 90 on the bottom number (diastolic), for example 140/90? NA      Today's PHQ-2 Score:   PHQ-2 ( 1999 [employer]) 10/7/2021   Q1: Little interest or pleasure in doing things 0   Q2: Feeling down, depressed or hopeless 0    PHQ-2 Score 0   Q1: Little interest or pleasure in doing things Not at all   Q2: Feeling down, depressed or hopeless Not at all   PHQ-2 Score 0       Abuse: Current or Past (Physical, Sexual or Emotional) - No  Do you feel safe in your environment? Yes        Social History     Tobacco Use     Smoking status: Never Smoker     Smokeless tobacco: Never Used     Tobacco comment: no 2nd hand smoke exposure   Substance Use Topics     Alcohol use: Yes     Comment: rarely         Alcohol Use 10/7/2021   Prescreen: >3 drinks/day or >7 drinks/week? No   Prescreen: >3 drinks/day or >7 drinks/week? -       Reviewed orders with patient.  Reviewed health maintenance and updated orders accordingly - Yes  Current Outpatient Medications   Medication     itraconazole (SPORANOX) 100 MG capsule     chlorthalidone (HYGROTON) 25 MG tablet     metoprolol succinate ER (TOPROL-XL) 50 MG 24 hr tablet     No current facility-administered medications for this visit.       Lab work is in process  Labs reviewed in EPIC    Breast Cancer Screening:    Breast CA Risk Assessment (FHS-7) 10/7/2021   Do you have a family history of breast, colon, or ovarian cancer? No / Unknown         Mammogram Screening: Recommended annual mammography  Pertinent mammograms are reviewed under the imaging tab.    History of abnormal Pap smear:   NO - age 30-65 PAP every 5 years with negative HPV co-testing recommended  Last 3 Pap and HPV Results:   PAP / HPV Latest Ref Rng & Units 9/23/2021 8/9/2017 7/18/2017   PAP   Atypical squamous cells of undetermined significance (ASC-US)(A) - -   PAP (Historical) - - ASC-US(A) ASC-US(A)   HPV16 Negative Negative Negative Negative   HPV18 Negative Negative Negative Negative   HRHPV Negative Negative Negative Negative     PAP / HPV Latest Ref Rng & Units 9/23/2021 8/9/2017 7/18/2017   PAP   Atypical squamous cells of undetermined significance (ASC-US)(A) - -   PAP (Historical) - - ASC-US(A) ASC-US(A)   HPV16 Negative Negative  "Negative Negative   HPV18 Negative Negative Negative Negative   HRHPV Negative Negative Negative Negative     Reviewed and updated as needed this visit by clinical staff  Tobacco  Allergies  Meds  Problems             Reviewed and updated as needed this visit by Provider     Problems            Past Medical History:   Diagnosis Date     Autoimmune hepatitis (H)     2005; resolved     HTN (hypertension)      Migraine     no longer active     Obesity       Past Surgical History:   Procedure Laterality Date     HC REMOVAL GALLBLADDER  01/01/1990       Review of Systems  CONSTITUTIONAL:POSITIVE  for weight gain  INTEGUMENTARY/SKIN: NEGATIVE for worrisome rashes, moles or lesions  EYES: NEGATIVE for vision changes or irritation  ENT: NEGATIVE for ear, mouth and throat problems  RESP: NEGATIVE for significant cough or SOB  BREAST: NEGATIVE for masses, tenderness or discharge  CV: NEGATIVE for chest pain, palpitations or peripheral edema  GI: NEGATIVE for nausea, abdominal pain, heartburn, or change in bowel habits   menopausal female: as above  MUSCULOSKELETAL: NEGATIVE for significant arthralgias or myalgia  NEURO: NEGATIVE for weakness, dizziness or paresthesias  PSYCHIATRIC: NEGATIVE for changes in mood or affect      OBJECTIVE:   /76   Pulse 64   Temp 97  F (36.1  C)   Ht 1.6 m (5' 3\")   Wt 90.3 kg (199 lb)   LMP 10/02/2018 (Approximate)   SpO2 98%   BMI 35.25 kg/m    Physical Exam  GENERAL APPEARANCE: alert, no distress and over weight  EYES: Eyes grossly normal to inspection, PERRL and conjunctivae and sclerae normal  HENT: ear canals and TM's normal, nose and mouth without ulcers or lesions, oropharynx clear and oral mucous membranes moist  NECK: no adenopathy, no asymmetry, masses, or scars and thyroid normal to palpation  RESP: lungs clear to auscultation - no rales, rhonchi or wheezes  BREAST: normal without masses, tenderness or nipple discharge and no palpable axillary masses or " adenopathy  CV: regular rate and rhythm, normal S1 S2, no S3 or S4, no murmur, click or rub, no peripheral edema and peripheral pulses strong  ABDOMEN: soft, nontender, no hepatosplenomegaly, no masses and bowel sounds normal   (female): normal female external genitalia, normal urethral meatus, vaginal mucosal atrophy noted and bimanual exam without masses or tenderness  MS: no musculoskeletal defects are noted and gait is age appropriate without ataxia  SKIN: no suspicious lesions or rashes; toenails thickened and dystrophic  NEURO: Normal strength and tone, sensory exam grossly normal, mentation intact and speech normal  PSYCH: mentation appears normal and affect normal/bright    Diagnostic Test Results:  Labs reviewed in [facility]  Labs pending    ASSESSMENT/PLAN:       ICD-10-CM    1. Routine general medical examination at a health care facility  Z00.00    2. Hyperlipidemia, unspecified hyperlipidemia type  E78.5    3. Essential hypertension  I10    4. Hypokalemia  E87.6    5. Class 1 obesity with serious comorbidity in adult, unspecified BMI, unspecified obesity type  E66.9    6. Need for vaccination  Z23 SHINGRIX [[id number]]   7. Special screening for malignant neoplasms, colon  Z12.11 COLOGUARD([employer])   8. Genital ulcer, female  N76.6 Herpes Simplex Virus 1 and 2 IgG     Herpes Simplex Virus 1 and 2 IgG     Herpes Simplex Virus 1 and 2 IgG     CANCELED: HSV IGM ANTIBODY     CANCELED: HSV 1 and 2 IgM RUFINO ([facility])     CANCELED: Herpes Simplex Virus 1 and 2 IgG   9. Onychomycosis  B35.1 itraconazole (SPORANOX) 100 MG capsule     Hepatic panel (Albumin, ALT, AST, Bili, Alk Phos, TP)     Hepatic panel (Albumin, ALT, AST, Bili, Alk Phos, TP)       Patient has been advised of split billing requirements and indicates understanding: Yes     Will call with lab results.  Return new cologuard kit.  Trial of antifungal for toenail infection - 1 week per month of medication.  Update liver enzymes first.  Pap abnormal,  "HPV negative - repeat cotesting in 1 year.  Pap report noted cells consistent with herpes, as did recent pelvic exam.  Patient does not think this is possible.  Discussed that it can be latent for a period of time, that outbreaks can range in severity and frequency.  New relationship past 5 months - most likely exposure.  Information given.  Antibody testing drawn per patient request.        COUNSELING:  Reviewed preventive health counseling, as reflected in patient instructions       Regular exercise       Healthy diet/nutrition       Vision screening       MMR vaccine reminder (1 dose) for patients born after 1956 with no documented vaccination/immunity        Osteoporosis prevention/bone health       Safe sex practices/STD prevention       Colon cancer screening       Consider Hep C screening for all patients one time for ages 18-79 years       HIV screeninx in teen years, 1x in adult years, and at intervals if high risk    Estimated body mass index is 35.25 kg/m  as calculated from the following:    Height as of this encounter: 1.6 m (5' 3\").    Weight as of this encounter: 90.3 kg (199 lb).    Weight management plan: Discussed healthy diet and exercise guidelines     Patient was on phenteramine in the past from [facility].    Did discuss consult with Mckayla Howard for weight management - medical.    She reports that she has never smoked. She has never used smokeless tobacco.      Counseling Resources:  ATP IV Guidelines  Pooled Cohorts Equation Calculator  Breast Cancer Risk Calculator  BRCA-Related Cancer Risk Assessment: FHS-7 Tool  FRAX Risk Assessment  ICSI Preventive Guidelines  Dietary Guidelines for Americans,   USDA's MyPlate  ASA Prophylaxis  Lung CA Screening    Thi Petersen MD  [facility] - [place]  "

## 2021-10-07 NOTE — NURSING NOTE
"Chief Complaint   Patient presents with     Physical       Initial /76   Pulse 64   Temp 97  F (36.1  C)   Ht 1.6 m (5' 3\")   Wt 90.3 kg (199 lb)   LMP 10/02/2018 (Approximate)   SpO2 98%   BMI 35.25 kg/m   Estimated body mass index is 35.25 kg/m  as calculated from the following:    Height as of this encounter: 1.6 m (5' 3\").    Weight as of this encounter: 90.3 kg (199 lb).  Medication Reconciliation: complete  Justyna Caraballo LPN  "

## 2021-10-08 ASSESSMENT — ANXIETY QUESTIONNAIRES: GAD7 TOTAL SCORE: 0

## 2021-10-11 ENCOUNTER — MYC MEDICAL ADVICE (OUTPATIENT)
Dept: FAMILY MEDICINE | Facility: OTHER | Age: 56
End: 2021-10-11

## 2021-10-11 LAB
HSV1 IGG SERPL QL IA: 1.38 INDEX
HSV1 IGG SERPL QL IA: ABNORMAL
HSV2 IGG SERPL QL IA: 0.07 INDEX
HSV2 IGG SERPL QL IA: ABNORMAL

## 2021-10-12 ENCOUNTER — NURSE TRIAGE (OUTPATIENT)
Dept: FAMILY MEDICINE | Facility: OTHER | Age: 56
End: 2021-10-12

## 2021-10-12 NOTE — TELEPHONE ENCOUNTER
Most bronchitis - is viral - symptomatic cares - antibiotics not indicated.  I am unable to see tomorrow.    Again - symptomatic cares - but if needing evaluation UC/ER or openings in clinic with another provider.

## 2021-10-12 NOTE — TELEPHONE ENCOUNTER
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     Pt reports she believes she has bronchitis. Sx started 10/8/21. Cough moderate wet, non productive. No fever. Reports she has a hoarse voice. Congestion present.  Denies wheezing and/or SOB. No other concerns or sx.    Care advice reviewed, encouraged home care OTC cough/cold medicine, push fluids, rest. Again pt requests to be seen.    Routing [employer] request to PCP to address tomorrow. Pt updated PCP is out today. Pt is requesting antibiotic.     Call back number: [phone]        Reason for Disposition    Cough with cold symptoms (e.g., runny nose, postnasal drip, throat clearing)    Additional Information    Negative: Severe difficulty breathing (e.g., struggling for each breath, speaks in single words)    Negative: Bluish (or gray) lips or face now    Negative: [1] Rapid onset of cough AND [2] has hives    Negative: Coughing started suddenly after medicine, an allergic food or bee sting    Negative: [1] Difficulty breathing AND [2] exposure to flames, smoke, or fumes    Negative: [1] Stridor AND [2] difficulty breathing    Negative: Sounds like a life-threatening emergency to the triager    Negative: Choked on object of food that could be caught in the throat    Negative: Chest pain is main symptom    Negative: [1] Previous asthma attacks AND [2] this feels like asthma attack    Negative: Cough lasts > 3 weeks    Negative: Wet (productive) cough (i.e., white-yellow, yellow, green, or yvonne colored sputum)    Negative: [1] Dry (non-productive) cough AND [2] within 14 days of COVID-19 Exposure    Negative: Chest pain  (Exception: MILD central chest pain, present only when coughing)    Negative: Difficulty breathing    Negative: Patient sounds very sick or weak to the triager    Negative: [1] Coughed up blood AND [2] > 1 tablespoon (15 ml) (Exception: blood-tinged sputum)    Negative: Fever > 103 F (39.4 C)    Negative: [1]  "Fever > 101 F (38.3 C) AND [2] age > 60    Negative: [1] Fever > 100.0 F (37.8 C) AND [2] bedridden (e.g., nursing home patient, CVA, chronic illness, recovering from surgery)    Negative: [1] Fever > 100.0 F (37.8 C) AND [2] diabetes mellitus or weak immune system (e.g., HIV positive, cancer chemo, splenectomy, organ transplant, chronic steroids)    Negative: Wheezing is present    Negative: [1] Ankle swelling AND [2] swelling is increasing    Negative: SEVERE coughing spells (e.g., whooping sound after coughing, vomiting after coughing)    Negative: [1] Continuous (nonstop) coughing interferes with work or school AND [2] no improvement using cough treatment per protocol    Negative: Fever present > 3 days (72 hours)    Negative: [1] Fever returns after gone for over 24 hours AND [2] symptoms worse or not improved    Negative: [1] Using nasal washes and pain medicine > 24 hours AND [2] sinus pain (around cheekbone or eye) persists    Negative: Earache is present    Negative: Cough has been present for > 3 weeks    Negative: [1] Nasal discharge AND [2] present > 10 days    Negative: [1] Coughed up blood-tinged sputum AND [2] more than once    Negative: [1] Patient also has allergy symptoms (e.g., itchy eyes, clear nasal discharge, postnasal drip) AND [2] they are acting up    Negative: Taking an ACE Inhibitor medication  (e.g., benazepril/LOTENSIN, captopril/CAPOTEN, enalapril/VASOTEC, lisinopril/ZESTRIL)    Negative: Exposure to TB (Tuberculosis)    Answer Assessment - Initial Assessment Questions  1. ONSET: \"When did the cough begin?\"       10/8/21  2. SEVERITY: \"How bad is the cough today?\"       Moderate   3. RESPIRATORY DISTRESS: \"Describe your breathing.\"       Normal no concerns  4. FEVER: \"Do you have a fever?\" If so, ask: \"What is your temperature, how was it measured, and when did it start?\"      no  5. HEMOPTYSIS: \"Are you coughing up any blood?\" If so ask: \"How much?\" (flecks, streaks, tablespoons, " "etc.)      no  6. TREATMENT: \"What have you done so far to treat the cough?\" (e.g., meds, fluids, humidifier)      OTC nyquil.   7. CARDIAC HISTORY: \"Do you have any history of heart disease?\" (e.g., heart attack, congestive heart failure)       no  8. LUNG HISTORY: \"Do you have any history of lung disease?\"  (e.g., pulmonary embolus, asthma, emphysema)      no  9. PE RISK FACTORS: \"Do you have a history of blood clots?\" (or: recent major surgery, recent prolonged travel, bedridden)      no  10. OTHER SYMPTOMS: \"Do you have any other symptoms? (e.g., runny nose, wheezing, chest pain)        Reports runny nose, congestion only sx. Denies chest pain  11. PREGNANCY: \"Is there any chance you are pregnant?\" \"When was your last menstrual period?\"        no  12. TRAVEL: \"Have you traveled out of the country in the last month?\" (e.g., travel history, exposures)        no    Protocols used: COUGH - ACUTE NON-PRODUCTIVE-A-AH      "

## 2021-10-12 NOTE — TELEPHONE ENCOUNTER
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  Type 1 is usually oral - cold sores - but can be genital.      She was negative for HSV 2 which genital.    I had ordered both IgG and IgM but that lab seems to have cancelled the IgM and only did IgG.    If patient has any further skin changes or outbreaks - would seek care at that time to do a direct skin culture.

## 2021-10-13 DIAGNOSIS — I10 BENIGN ESSENTIAL HYPERTENSION: ICD-10-CM

## 2021-10-13 RX ORDER — CHLORTHALIDONE 25 MG/1
TABLET ORAL
Qty: 90 TABLET | Refills: 0 | Status: SHIPPED | OUTPATIENT
Start: 2021-10-13 | End: 2022-01-21

## 2021-10-13 NOTE — TELEPHONE ENCOUNTER
Alivia       Last Written Prescription Date:  7/27/2021  Last Fill Quantity: 90,   # refills: 0  Last Office Visit: 10/7/2021  Future Office visit:

## 2021-10-19 DIAGNOSIS — I10 BENIGN ESSENTIAL HYPERTENSION: ICD-10-CM

## 2021-10-19 RX ORDER — METOPROLOL SUCCINATE 50 MG/1
50 TABLET, EXTENDED RELEASE ORAL DAILY
Qty: 90 TABLET | Refills: 1 | Status: SHIPPED | OUTPATIENT
Start: 2021-10-19 | End: 2022-03-15

## 2021-10-25 LAB — COLOGUARD-ABSTRACT: NEGATIVE

## 2021-11-11 DIAGNOSIS — E66.01 MORBID OBESITY DUE TO EXCESS CALORIES (H): ICD-10-CM

## 2021-11-12 RX ORDER — PHENTERMINE HYDROCHLORIDE 30 MG/1
CAPSULE ORAL
Qty: 30 CAPSULE | Refills: 0 | Status: SHIPPED | OUTPATIENT
Start: 2021-11-12 | End: 2022-03-15

## 2021-11-12 NOTE — TELEPHONE ENCOUNTER
Adipex      Last Written Prescription Date:  0  Last Fill Quantity: 0,   # refills: 0  Last Office Visit: 10/7/21  Future Office visit:       Routing refill request to provider for review/approval because:  Drug not active on patient's medication list

## 2021-11-26 ENCOUNTER — TELEPHONE (OUTPATIENT)
Dept: NURSING | Facility: CLINIC | Age: 56
End: 2021-11-26

## 2021-11-26 NOTE — TELEPHONE ENCOUNTER
Outreach made to patient, below information reviewed with Tracy Robles.     This is, Perfect, CASTILLO June from [facility] calling in regards to the Moderna Booster vaccination you received at the [facility] on November 19th.  We are calling to let you know that the dose you received on the 19th was considered a full dose of the Moderna vaccine identical to your first two doses.  You should have received half of the full dose instead as a booster.  While we do not anticipate the increased dose of the Moderna booster will result any serious side effects, we are truly sorry for this mistake.    It is important to note that the full, 0.5 milliliters dose of the Moderna vaccine was tested on patients during clinical trials and is currently being offered to individuals who are immunocompromised as a  third dose.  In effect, individuals impacted by this error received the dose we would normally administer to immunocompromised patients as a  third dose.      During different studies higher and lower Moderna doses have been used for both primary series immunizations and booster immunizations.  These different doses have resulted in similar side effect that include chills, fever, headache, malaise and/or fatigue, joint stiffness, nausea and general muscle pain.  Local symptoms include booster site redness, swelling, pain and tenderness.  In general patients who receive higher doses of the Moderna vaccine may experience a higher prevalence of the noted side effects due to the potential for a greater immune response.  However, most patients will experience mild symptoms similar to previous administration of this vaccine and in the vast majority of cases, symptoms start within a few days of receiving the vaccine.  While rare, myocarditis (inflammation of the heart muscle) and pericarditis (inflammation of the lining outside of the heart) have occurred in some people who received COVID vaccines of all  manufactures.      An email will be sent to you summarizing this conversation.  At this time, unless you are experiencing any severe side effects, no follow-up on your part is needed.  The email will also provide contact information's for both local and system members of [place] to help answer any questions you may have.     At what point should you call a doctor?  In most cases, discomfort from pain or fever is a normal sign that your body is building protection, the CDC states. Still, the agency recommends you contact your doctor or healthcare provider if:    The redness or tenderness where you got the shot gets worse after 24 hours    Your side effects are worrying you or do not seem to be going away after a few days  Anyone who believes they are experiencing a severe allergic reaction should also seek immediate medical care by calling 911, the CDC recommends.    Conclusion  While we do not anticipate the increased dose of the Moderna booster will result any serious side effects, we are truly sorry for this mistake .          Shaylee Wei RN November 26, 2021 4:20 PM

## 2021-11-29 ENCOUNTER — TRANSFERRED RECORDS (OUTPATIENT)
Dept: HEALTH INFORMATION MANAGEMENT | Facility: CLINIC | Age: 56
End: 2021-11-29

## 2021-12-15 NOTE — PROGRESS NOTES
Occupational Visit     SUBJECTIVE:  Nadeen Robles, 56 year old, female is seen for new evaluation and treatment of occupational injury. Date of injury is 11/29/21.    Linked Episodes   Type: Episode: Status: Noted: Resolved: Last update: Updated by:   WORK COMP work com Active 11/29/2021 12/16/2021 11:02 AM Salas Carroll LPN      Comments:       Musculoskeletal problem/pain      Duration: 2 weeks    Description  Location: right knee    Intensity:  mild    Accompanying signs and symptoms: none    History  Previous similar problem: no prior arthroscopy  Previous evaluation:  x-ray and urgent care- [facility]    Precipitating or alleviating factors:  Trauma or overuse: YES  Aggravating factors include: walking and climbing stairs    Therapies tried and outcome: Ibuprofen    Xray report - advanced osteoarthritis - small effusion.  [facility] report viewed.    Shoveling, hit ice, fell on right knee, right arm.  Placed out of work through 12/2/2021.  No restrictions.  No pain at rest.  Some pain with walking - lateral - sharp.  Front of knee aches at end of shift.    Prior meniscal tear s/p surgery.  Chronic arthritis.        Allergies   Allergen Reactions     Nkda [No Known Drug Allergies]          Review of Systems:  Constitutional, HEENT, cardiovascular, pulmonary, gi and gu systems are negative, except as otherwise noted.      OBJECTIVE:  Vitals:    12/16/21 1100   BP: 128/68   Pulse: 78   Temp: 97.7  F (36.5  C)   SpO2: 98%       Exam:  GENERAL: healthy, alert and no distress  SKIN: lower extremities without bruising or rash  NEURO: sensation intact  MS: right knee without effusion or deformity; normal AROM; ligaments stable - negative anterior/posterior drawer; negative Regine's      Labs: No results found for this or any previous visit (from the past 24 hour(s)).      ASSESSMENT/PLAN:  There are no diagnoses linked to this encounter.(M25.561) Acute pain of right knee  (primary encounter  diagnosis)  Comment: significant underlying arthritis on xray- pre existing - and prior knee arthroscopy.  No pain or tenderness at rest.  Worse after on feet for prolonged period at work.  Low suspicion for repeat meniscal injury - no effusion - negative mcmurrays - no joint line tenderness.  Plan:   Place on restrictions for 1 week to allow strain/sprain to heal.  No squatting.   Limit bend/walk/stand to 3 hours per shift.

## 2021-12-16 ENCOUNTER — OFFICE VISIT (OUTPATIENT)
Dept: FAMILY MEDICINE | Facility: OTHER | Age: 56
End: 2021-12-16
Attending: FAMILY MEDICINE
Payer: OTHER MISCELLANEOUS

## 2021-12-16 VITALS
TEMPERATURE: 97.7 F | WEIGHT: 200 LBS | BODY MASS INDEX: 34.15 KG/M2 | OXYGEN SATURATION: 98 % | HEART RATE: 78 BPM | HEIGHT: 64 IN | SYSTOLIC BLOOD PRESSURE: 128 MMHG | DIASTOLIC BLOOD PRESSURE: 68 MMHG

## 2021-12-16 DIAGNOSIS — M25.561 ACUTE PAIN OF RIGHT KNEE: Primary | ICD-10-CM

## 2021-12-16 PROCEDURE — 99213 OFFICE O/P EST LOW 20 MIN: CPT | Performed by: FAMILY MEDICINE

## 2021-12-16 RX ORDER — ITRACONAZOLE 100 MG/1
CAPSULE ORAL
COMMUNITY
Start: 2021-11-28 | End: 2022-02-18

## 2021-12-16 ASSESSMENT — MIFFLIN-ST. JEOR: SCORE: 1487.19

## 2021-12-16 ASSESSMENT — PAIN SCALES - GENERAL: PAINLEVEL: NO PAIN (1)

## 2021-12-16 NOTE — NURSING NOTE
"Chief Complaint   Patient presents with     Work Comp     right knee       Initial /68   Pulse 78   Temp 97.7  F (36.5  C) (Tympanic)   Ht 1.626 m (5' 4\")   Wt 90.7 kg (200 lb)   LMP 10/02/2018 (Approximate)   SpO2 98%   BMI 34.33 kg/m   Estimated body mass index is 34.33 kg/m  as calculated from the following:    Height as of this encounter: 1.626 m (5' 4\").    Weight as of this encounter: 90.7 kg (200 lb).  Medication Reconciliation: complete  Salas Carroll LPN  "

## 2021-12-16 NOTE — LETTER
EMERGENCY DEPARTMENT HISTORY AND PHYSICAL EXAM    12:31 AM      Date: 9/4/2019  Patient Name: Baldomero Dunn    History of Presenting Illness     No chief complaint on file. History Provided By: Patient    Chief Complaint: Facial laceration      Additional History (Context): Baldomero Dunn is a 29 y.o. male with cocaine abuse who presents with a small laceration over his left eyebrow. Patient states he was standing from a seated position when he scraped his head against a brick from his fireplace. Patient states, \"I didn't come here to talk about drugs. I ain't talkin' no drug use or nuthin' here,\" when asked about drug use history. Patient denies loss of consciousness, headache, visual disturbance, nausea, and vomiting. Tetanus updated last year. PCP: None    Current Outpatient Medications   Medication Sig Dispense Refill    ondansetron (ZOFRAN ODT) 4 mg disintegrating tablet Take 1 Tab by mouth every eight (8) hours as needed for Nausea. 8 Tab 0    rOPINIRole (REQUIP) 0.5 mg tablet Take 1 Tab by mouth nightly. 7 Tab 0       Past History     Past Medical History:  Past Medical History:   Diagnosis Date    Forearm injury 2000    right Laceration which required stitches       Past Surgical History:  No past surgical history on file. Family History:  Family History   Problem Relation Age of Onset    No Known Problems Mother     No Known Problems Father     No Known Problems Brother     No Known Problems Maternal Grandmother     No Known Problems Maternal Grandfather     No Known Problems Paternal Grandmother     No Known Problems Paternal Grandfather     Cancer Neg Hx     Diabetes Neg Hx     Hypertension Neg Hx     Heart Disease Neg Hx     Stroke Neg Hx        Social History:  Social History     Tobacco Use    Smoking status: Never Smoker    Smokeless tobacco: Never Used   Substance Use Topics    Alcohol use: No     Alcohol/week: 0.0 standard drinks    Drug use:  No [facility]  [address] [place] [address]  Phone: [phone]    December 16, 2021      Re:  Nadeen Robles  [address]  [place] [address]          To whom it may concern:    RE: Nadeen Robles    Patient was seen and treated today at our clinic for scheduled appointment - follow up right knee injury.  Please excuse for appointment today.    Patient may return to work 12/16/2021 with the following outline restrictions below.  When the patient returns to work, the following restrictions apply until 12/23/2021:    Bend: Occasionally (1-3 hours)  Squat: Not at all (0 hours)  Walk/Stand: Occasionally (1-3 hours)     Please contact me for questions or concerns.      Sincerely,        Thi Petersen MD   Allergies:  No Known Allergies      Review of Systems       Review of Systems   Constitutional: Negative for fever. HENT: Negative for facial swelling. Eyes: Negative for visual disturbance. Respiratory: Negative for shortness of breath. Cardiovascular: Negative for chest pain. Gastrointestinal: Negative for abdominal pain. Genitourinary: Negative for dysuria. Musculoskeletal: Negative for neck pain. Skin: Positive for wound. Negative for rash. Neurological: Negative for dizziness. Psychiatric/Behavioral: Negative for confusion. All other systems reviewed and are negative. Physical Exam     Visit Vitals  /67 (BP 1 Location: Left arm, BP Patient Position: At rest;Sitting)   Pulse 69   Temp 97.8 °F (36.6 °C)   Resp 14   Ht 5' 9\" (1.753 m)   Wt 72.1 kg (159 lb)   SpO2 97%   BMI 23.48 kg/m²         Physical Exam   Constitutional: He appears well-developed and well-nourished. No distress. HENT:   Head: Normocephalic. There is a small ~0.5-cm laceration traversing the inner left eyebrow without active bleeding. Eyes: Conjunctivae are normal.   Neck: Normal range of motion. Neck supple. Cardiovascular: Normal rate. Pulmonary/Chest: Effort normal.   Abdominal: Soft. Musculoskeletal: Normal range of motion. Neurological: He is alert. Skin: Skin is warm and dry. He is not diaphoretic. Psychiatric: He has a normal mood and affect. Nursing note and vitals reviewed. Diagnostic Study Results     Labs -  No results found for this or any previous visit (from the past 12 hour(s)). Radiologic Studies -   No orders to display         Medical Decision Making   I am the first provider for this patient. I reviewed the vital signs, available nursing notes, past medical history, past surgical history, family history and social history. Vital Signs-Reviewed the patient's vital signs.       Records Reviewed: Nursing Notes (Time of Review: 12:31 AM)    ED Course: Progress Notes, Reevaluation, and Consults:      Provider Notes (Medical Decision Making): MDM  Number of Diagnoses or Management Options  Laceration of left eyebrow, initial encounter:   Diagnosis management comments: 36-year old male presents with a small, superficial laceration to the left eyebrow. No hematoma. No associated alteration of consciousness. Simple lac repair using 1% lidocaine without epinephrine closed with 2 5-0 Ethicon. Wound Repair  Date/Time: 9/4/2019 12:47 AM  Performed by: [id number] [facility] provider: Jesenia   Preparation: skin prepped with Betadine and sterile field established  Pre-procedure re-eval: Immediately prior to the procedure, the patient was reevaluated and found suitable for the planned procedure and any planned medications. Time out: Immediately prior to the procedure a time out was called to verify the correct patient, procedure, equipment, staff and marking as appropriate. .  Location details: left eyebrow  Wound length:2.5 cm or less    Anesthesia:  Local Anesthetic: lidocaine 1% without epinephrine  Foreign bodies: no foreign bodies  Irrigation solution: saline  Irrigation method: syringe  Debridement: none  Skin closure: 5-0 nylon  Number of sutures: 2  Technique: simple and interrupted  Approximation: close  Patient tolerance: Patient tolerated the procedure well with no immediate complications  My total time at bedside, performing this procedure was 16-30 minutes. Diagnosis     Clinical Impression:   1.  Laceration of left eyebrow, initial encounter        Disposition: Discharged      Follow-up Information     Follow up With Specialties Details Why Contact Info    [facility] EMERGENCY DEPT Emergency Medicine In 7 days For suture removal [address] [facility] [address]  [phone]    [facility] EMERGENCY DEPT Emergency Medicine  Immediately if symptoms worsen [address]  [phone]           Patient's Medications   Start Taking    No medications on file   Continue Taking    ONDANSETRON (ZOFRAN ODT) 4 MG DISINTEGRATING TABLET    Take 1 Tab by mouth every eight (8) hours as needed for Nausea. ROPINIROLE (REQUIP) 0.5 MG TABLET    Take 1 Tab by mouth nightly. These Medications have changed    No medications on file   Stop Taking    No medications on file     _______________________________    Attestations:  Mili Costello PA-C acting as a scribe for and in the presence of CELIO Rawls      September 04, 2019 at 1:11 AM       Provider Attestation:      I personally performed the services described in the documentation, reviewed the documentation, as recorded by the scribe in my presence, and it accurately and completely records my words and actions.  September 04, 2019 at 1:11 AM - CELIO Rawls  _______________________________

## 2022-01-04 NOTE — TELEPHONE ENCOUNTER
Itraconazole      Last Written Prescription Date:  0  Last Fill Quantity: 0,   # refills: 0  Last Office Visit: 12/16/21  Future Office visit:       Routing refill request to provider for review/approval because:  Medication is reported/historical

## 2022-01-21 DIAGNOSIS — I10 BENIGN ESSENTIAL HYPERTENSION: ICD-10-CM

## 2022-01-21 RX ORDER — CHLORTHALIDONE 25 MG/1
TABLET ORAL
Qty: 90 TABLET | Refills: 2 | Status: SHIPPED | OUTPATIENT
Start: 2022-01-21 | End: 2022-03-15

## 2022-01-25 ENCOUNTER — TRANSFERRED RECORDS (OUTPATIENT)
Dept: HEALTH INFORMATION MANAGEMENT | Facility: CLINIC | Age: 57
End: 2022-01-25

## 2022-02-14 ENCOUNTER — LAB (OUTPATIENT)
Dept: LAB | Facility: OTHER | Age: 57
End: 2022-02-14
Payer: COMMERCIAL

## 2022-02-14 DIAGNOSIS — B35.1 ONYCHOMYCOSIS: ICD-10-CM

## 2022-02-14 LAB
ALBUMIN SERPL-MCNC: 3.9 G/DL (ref 3.4–5)
ALP SERPL-CCNC: 91 U/L (ref 40–150)
ALT SERPL W P-5'-P-CCNC: 71 U/L (ref 0–50)
AST SERPL W P-5'-P-CCNC: 22 U/L (ref 0–45)
BILIRUB DIRECT SERPL-MCNC: <0.1 MG/DL (ref 0–0.2)
BILIRUB SERPL-MCNC: 0.3 MG/DL (ref 0.2–1.3)
PROT SERPL-MCNC: 7.5 G/DL (ref 6.8–8.8)

## 2022-02-14 PROCEDURE — 36415 COLL VENOUS BLD VENIPUNCTURE: CPT

## 2022-02-14 PROCEDURE — 80076 HEPATIC FUNCTION PANEL: CPT

## 2022-02-18 ENCOUNTER — NURSE TRIAGE (OUTPATIENT)
Dept: FAMILY MEDICINE | Facility: OTHER | Age: 57
End: 2022-02-18
Payer: COMMERCIAL

## 2022-02-18 ENCOUNTER — OFFICE VISIT (OUTPATIENT)
Dept: FAMILY MEDICINE | Facility: OTHER | Age: 57
End: 2022-02-18
Attending: NURSE PRACTITIONER
Payer: COMMERCIAL

## 2022-02-18 VITALS
BODY MASS INDEX: 34.15 KG/M2 | DIASTOLIC BLOOD PRESSURE: 70 MMHG | RESPIRATION RATE: 16 BRPM | WEIGHT: 200 LBS | SYSTOLIC BLOOD PRESSURE: 116 MMHG | HEIGHT: 64 IN | HEART RATE: 74 BPM | OXYGEN SATURATION: 97 % | TEMPERATURE: 97.9 F

## 2022-02-18 DIAGNOSIS — J40 BRONCHITIS: Primary | ICD-10-CM

## 2022-02-18 LAB
FLUAV RNA SPEC QL NAA+PROBE: NEGATIVE
FLUBV RNA RESP QL NAA+PROBE: NEGATIVE
RSV RNA SPEC NAA+PROBE: NEGATIVE
SARS-COV-2 RNA RESP QL NAA+PROBE: NEGATIVE

## 2022-02-18 PROCEDURE — 99213 OFFICE O/P EST LOW 20 MIN: CPT | Performed by: NURSE PRACTITIONER

## 2022-02-18 PROCEDURE — 87637 SARSCOV2&INF A&B&RSV AMP PRB: CPT | Performed by: NURSE PRACTITIONER

## 2022-02-18 RX ORDER — AZITHROMYCIN 250 MG/1
TABLET, FILM COATED ORAL
Qty: 6 TABLET | Refills: 0 | Status: SHIPPED | OUTPATIENT
Start: 2022-02-18 | End: 2022-02-23

## 2022-02-18 RX ORDER — PREDNISONE 20 MG/1
20 TABLET ORAL 2 TIMES DAILY
Qty: 10 TABLET | Refills: 0 | Status: SHIPPED | OUTPATIENT
Start: 2022-02-18 | End: 2022-02-23

## 2022-02-18 ASSESSMENT — PAIN SCALES - GENERAL: PAINLEVEL: NO PAIN (0)

## 2022-02-18 NOTE — PROGRESS NOTES
"  Assessment & Plan     Bronchitis  See AVS.   - predniSONE (DELTASONE) 20 MG tablet; Take 1 tablet (20 mg) by mouth 2 times daily for 5 days  - azithromycin (ZITHROMAX) 250 MG tablet; Take 2 tablets (500 mg) by mouth daily for 1 day, THEN 1 tablet (250 mg) daily for 4 days.  - Symptomatic; Yes; 2/15/2022 Influenza A/B & SARS-CoV2 (COVID-19) Virus PCR Multiplex; Future  - Symptomatic; Yes; 2/15/2022 Influenza A/B & SARS-CoV2 (COVID-19) Virus PCR Multiplex Nose    Prescription drug management       Return if symptoms worsen or fail to improve.    Ruth Ann Jones, [facility] [place] is a 56 year old who presents for the following health issues    HPI     Acute Illness  Acute illness concerns: cough  Onset/Duration: Started 3 days ago and tested with at home COVID test 2 days ago and negative.   Symptoms:  Fever: no  Chills/Sweats: no  Headache (location?): no  Sinus Pressure: YES- a little bit  Conjunctivitis:  No but eyes have pressure/tiredness/itching  Ear Pain: YES: both  Rhinorrhea: YES- clear   Congestion: YES both sinus and chest  Sore Throat: no  Cough: YES  Wheeze: YES- a little bit  Decreased Appetite: no  Nausea: no  Vomiting: no  Diarrhea: no  Dysuria/Freq.: no  Dysuria or Hematuria: no  Fatigue/Achiness: YES  Sick/Strep Exposure: work  Therapies tried and outcome: cough medicine    No hx of asthma. Reports that she gets bronchitis about once a year and starts on an antibiotic and it goes away.       Review of Systems   Constitutional, HEENT, cardiovascular, pulmonary, gi and gu systems are negative, except as otherwise noted.      Objective    /70 (BP Location: Right arm, Patient Position: Sitting, Cuff Size: Adult Large)   Pulse 74   Temp 97.9  F (36.6  C) (Tympanic)   Resp 16   Ht 1.626 m (5' 4\")   Wt 90.7 kg (200 lb)   LMP 10/02/2018 (Approximate)   SpO2 97%   BMI 34.33 kg/m    Body mass index is 34.33 kg/m .  Physical Exam   GENERAL: healthy, " alert and no distress  EYES: Eyes grossly normal to inspection, PERRL and conjunctivae and sclerae normal  HENT: ear canals and TM's normal, nose and mouth without ulcers or lesions  NECK: no adenopathy, no asymmetry, masses, or scars and thyroid normal to palpation  RESP: frequent cough. Occasional rhonchi throughout  CV: regular rate and rhythm, normal S1 S2, no S3 or S4, no murmur, click or rub, no peripheral edema and peripheral pulses strong    Results for orders placed or performed in visit on 02/18/22   Symptomatic; Yes; 2/15/2022 Influenza A/B & SARS-CoV2 (COVID-19) Virus PCR Multiplex Nose     Status: Normal    Specimen: Nose; Swab   Result Value Ref Range    Influenza A PCR Negative Negative    Influenza B PCR Negative Negative    RSV PCR Negative Negative    SARS CoV2 PCR Negative Negative    Narrative    Testing was performed using the Xpert Xpress CoV2/Flu/RSV Assay on the [employer] Gene[employer]pert Instrument. This test should be ordered for the detection of SARS-CoV-2 and influenza viruses in individuals who meet clinical and/or epidemiological criteria. Test performance is unknown in asymptomatic patients. This test is for in vitro diagnostic use under the FDA EUA for laboratories certified under CLIA to perform high or moderate complexity testing. This test has not been FDA cleared or approved. A negative result does not rule out the presence of PCR inhibitors in the specimen or target RNA in concentration below the limit of detection for the assay. If only one viral target is positive but coinfection with multiple targets is suspected, the sample should be re-tested with another FDA cleared, approved, or authorized test, if coinfection would change clinical management. This test was validated by the [facility]. These laboratories are certified under the Clinical  Laboratory Improvement Amendments of 1988 (CLIA-88) as qualified to perform high complexity laboratory testing.

## 2022-02-18 NOTE — TELEPHONE ENCOUNTER
"Pt called requested to be seen pt reports \"I think I have bronchitis\". Pt reports home negative rapid covid test on 2/16/22.Pt reports trying OTC cough/cold medication is pushing fluids, requests to be seen.   Per PCP homecare as advised. Pt then stated I need a note to return back to work. Excuse on 2/15/22 Return on 2/21/22.     Pt scheduled with covering provider reviewed plan with PCP approved.     Reason for Disposition    [1] Continuous (nonstop) coughing interferes with work or school AND [2] no improvement using cough treatment per protocol    Additional Information    Negative: SEVERE difficulty breathing (e.g., struggling for each breath, speaks in single words)    Negative: Difficult to awaken or acting confused (e.g., disoriented, slurred speech)    Negative: Bluish (or gray) lips or face now    Negative: Shock suspected (e.g., cold/pale/clammy skin, too weak to stand, low BP, rapid pulse)    Negative: Sounds like a life-threatening emergency to the triager    Negative: [1] COVID-19 exposure AND [2] no symptoms    Negative: COVID-19 vaccine reaction suspected (e.g., fever, headache, muscle aches) occurring 1 to 3 days after getting vaccine    Negative: COVID-19 vaccine, questions about    Negative: [1] Lives with someone known to have influenza (flu test positive) AND [2] flu-like symptoms (e.g., cough, runny nose, sore throat, SOB; with or without fever)    Negative: [1] Adult with possible COVID-19 symptoms AND [2] triager concerned about severity of symptoms or other causes    Negative: COVID-19 and breastfeeding, questions about    Negative: SEVERE or constant chest pain or pressure (Exception: mild central chest pain, present only when coughing)    Negative: MODERATE difficulty breathing (e.g., speaks in phrases, SOB even at rest, pulse 100-120)    Negative: [1] Headache AND [2] stiff neck (can't touch chin to chest)    Negative: MILD difficulty breathing (e.g., minimal/no SOB at rest, SOB with " "walking, pulse <100)    Negative: Chest pain or pressure    Negative: Patient sounds very sick or weak to the triager    Negative: Fever > 103 F (39.4 C)    Negative: [1] Fever > 101 F (38.3 C) AND [2] age > 60 years    Negative: [1] Fever > 100.0 F (37.8 C) AND [2] bedridden (e.g., nursing home patient, CVA, chronic illness, recovering from surgery)    Negative: HIGH RISK for severe COVID complications (e.g., age > 64 years, obesity with BMI > 25, pregnant, chronic lung disease or other chronic medical condition)  (Exception: Already seen by PCP and no new or worsening symptoms.)    Negative: [1] HIGH RISK patient AND [2] influenza is widespread in the community AND [3] ONE OR MORE respiratory symptoms: cough, sore throat, runny or stuffy nose    Negative: [1] HIGH RISK patient AND [2] influenza exposure within the last 7 days AND [3] ONE OR MORE respiratory symptoms: cough, sore throat, runny or stuffy nose    Negative: [1] COVID-19 infection suspected by caller or triager AND [2] mild symptoms (cough, fever, or others) AND [3] negative COVID-19 rapid test    Negative: Fever present > 3 days (72 hours)    Negative: [1] Fever returns after gone for over 24 hours AND [2] symptoms worse or not improved    Answer Assessment - Initial Assessment Questions  1. COVID-19 DIAGNOSIS: \"Who made your Coronavirus (COVID-19) diagnosis?\" \"Was it confirmed by a positive lab test?\" If not diagnosed by a HCP, ask \"Are there lots of cases (community spread) where you live?\" (See public health department website, if unsure)      no  2. COVID-19 EXPOSURE: \"Was there any known exposure to COVID before the symptoms began?\" CDC Definition of close contact: within 6 feet (2 meters) for a total of 15 minutes or more over a 24-hour period.       no  3. ONSET: \"When did the COVID-19 symptoms start?\"       2/15/22  4. WORST SYMPTOM: \"What is your worst symptom?\" (e.g., cough, fever, shortness of breath, muscle aches)      cough  5. COUGH: \"Do " "you have a cough?\" If Yes, ask: \"How bad is the cough?\"        Pt reports dry and wet cough non productive, moderate cough  6. FEVER: \"Do you have a fever?\" If Yes, ask: \"What is your temperature, how was it measured, and when did it start?\"      no  7. RESPIRATORY STATUS: \"Describe your breathing?\" (e.g., shortness of breath, wheezing, unable to speak)       no  8. BETTER-SAME-WORSE: \"Are you getting better, staying the same or getting worse compared to yesterday?\"  If getting worse, ask, \"In what way?\"      same  9. HIGH RISK DISEASE: \"Do you have any chronic medical problems?\" (e.g., asthma, heart or lung disease, weak immune system, obesity, etc.)      obesity  10. PREGNANCY: \"Is there any chance you are pregnant?\" \"When was your last menstrual period?\"        no  11. OTHER SYMPTOMS: \"Do you have any other symptoms?\"  (e.g., chills, fatigue, headache, loss of smell or taste, muscle pain, sore throat; new loss of smell or taste especially support the diagnosis of COVID-19)        Hoarse voice, congestion,    Protocols used: CORONAVIRUS (COVID-19) DIAGNOSED OR [id number] 8.25.2021      "

## 2022-02-18 NOTE — PATIENT INSTRUCTIONS
Patient Education     What Is Acute Bronchitis?  Acute bronchitis is when the airways in your lungs (bronchial tubes) become red and swollen (inflamed). It's usually caused by a viral infection. But it can also occur because of a bacteria or allergen. Symptoms include a cough that produces yellow or greenish mucus and can last for days or sometimes weeks.  Lungs with bronchitis  Bronchitis often occurs with a cold or the flu virus. The airways become inflamed (red and swollen). There is a deep hacking cough from the extra mucus. Other symptoms may include:    Wheezing    Chest discomfort    Shortness of breath    Mild fever  A second infection, this time due to bacteria, may then occur. And airways irritated by allergens or smoke are more likely to get infected.  Making a diagnosis  A physical exam, health history, and certain tests help your healthcare provider make the diagnosis.  Health history  Your healthcare provider will ask you about past illnesses and your current symptoms. You will also be asked about what medicines you currently take, including over-the-counter medicines, vitamins, herbs, and supplements.  The exam  Your provider listens to your chest for signs of congestion. He or she may also check your ears, nose, and throat.  Possible tests    A sputum test for bacteria. This requires a sample of mucus from your lungs.    A nasal or throat swab. This tests to see if you have a bacterial or viral infection.    A chest X-ray. This is done if your healthcare provider thinks you have pneumonia.    Tests to check for an underlying condition. Other tests may be done to check for things such as allergies, asthma, or COPD (chronic obstructive pulmonary disease). You may need to see a specialist for more lung function testing.    Treating a cough  The main treatment for bronchitis is easing symptoms. Avoiding smoke, allergens, and other things that trigger coughing can often help. If the infection is  bacterial, you may be given antibiotics. During the illness, it's important to get plenty of sleep. To ease symptoms:    Don t smoke. Also avoid secondhand smoke.    Use a humidifier. Or try breathing in steam from a hot shower. This may help loosen mucus.    Drink a lot of water and juice. They can soothe the throat and may help thin mucus.    Sit up or use extra pillows when in bed. This helps to lessen coughing and congestion.    Ask your provider about using medicine. Ask about using cough medicine, pain and fever medicine, or a decongestant.  Antibiotics  Most cases of bronchitis are caused by cold or flu viruses. They don t need antibiotics to treat them, even if your mucus is thick and green or yellow. Antibiotics don t treat viral illness and antibiotics have not been shown to have any benefit in cases of acute bronchitis. Taking antibiotics when they are not needed increases your risk of getting an infection later that is antibiotic-resistant. Antibiotics can also cause severe cases of diarrhea that require other antibiotics to treat.  It's important that you accept your healthcare provider's advice to not use antibiotics. Your provider will prescribe antibiotics if the infection is caused by bacteria. If they are prescribed:    Take all of the medicine. Take the medicine until it's used up, even if symptoms have improved. If you don t, the bronchitis may come back.    Take the medicines as directed. For instance, some medicines should be taken with food.    Ask about side effects. Ask your provider or pharmacist what side effects are common, and what to do about them.  Follow-up care  You should see your provider again in 2 to 3 weeks. By this time, symptoms should have improved. An infection that lasts longer may mean you have a more serious problem.  Prevention    Stay away from tobacco smoke. If you smoke, quit. Stay away from smoky places. Ask friends and family not to smoke around you, or in your home  or car.    Get checked for allergies.    Ask your provider about getting a yearly flu shot. Also ask about pneumococcal or pneumonia shots.    Wash your hands often. This helps reduce the chance of picking up viruses that cause colds and flu.    When to call your healthcare provider  Call your healthcare provider immediately if:    Symptoms worsen, or you have new symptoms    Breathing problems worsen    Symptoms don t get better within a week, or within 3 days of taking antibiotics  Call 911  Call 911 if you are:    Wheezing    Feeling lightheaded or dizzy    Unable to talk    Skin or nails looks blue or pale  Inside healthy lungs    Air travels in and out of the lungs through the airways. The linings of these airways produce sticky mucus. This mucus traps particles that enter the lungs. Tiny structures called cilia then sweep the particles out of the airways.     Healthy airway: Airways are normally open. Air moves in and out easily.      Healthy cilia: Tiny, hairlike cilia sweep mucus and particles up and out of the airways.        Inflamed airway: Inflammation and extra mucus narrow the airway, causing shortness of breath.      Impaired cilia: Extra mucus impairs cilia, causing congestion and wheezing. Smoking makes the problem worse.     [employer] last reviewed this educational content on 6/1/2019 2000-2021 [employer]. All rights reserved. This information is not intended as a substitute for professional medical care. Always follow your healthcare professional's instructions.           1. Bronchitis  Start the azithromycin only if no improvement over the next 2-3 days.  - predniSONE (DELTASONE) 20 MG tablet; Take 1 tablet (20 mg) by mouth 2 times daily for 5 days  Dispense: 10 tablet; Refill: 0  - azithromycin (ZITHROMAX) 250 MG tablet; Take 2 tablets (500 mg) by mouth daily for 1 day, THEN 1 tablet (250 mg) daily for 4 days.  Dispense: 6 tablet; Refill: 0  - Symptomatic; Yes; 2/15/2022  Influenza A/B & SARS-CoV2 (COVID-19) Virus PCR Multiplex; Future      To support your body's ability to stay well, The following are encouraged:   Fluids- Water or low-sugar sports type drink are good choices  Rest as much as you are able. Your body needs   If you need fever control- you may use acetaminophen and/or ibuprofen per instructions on the package unless you have been told by a provider not to take one of these medications.    Use a cool mist humidifier. Please follow manufacture's cleaning instructions.  You may try guaifenesin, loratadine, and/or fluticasone nasal spray per package instructions. Please read all active ingredients on all packages. Many contain multiple drugs and it is very easy to accidentally take the same active ingredient from multiple sources.   May use honey in if over the age of 12 months to soothe your throat. Either swallow plain or you may gargle.   Commercial, over the counter saline nasal washes or rinses have been proven effective for sinus congestion. Saline sprays may be helpful if you can not tolerate the full sinus rinse.     Go to the emergency department with persistent, worsening, or worrisome symptoms. Some worrisome symptoms include difficulty breathing, high fever that does not respond to medications, not urinating normally (at least 4 times per day), not tolerating fluids, or change in mental status (example: confusion).

## 2022-02-18 NOTE — LETTER
February 18, 2022      Nadeen Robles  [address]  [place] [address]        To Whom It May Concern:    Nadeen Robles  was seen on 2/18/22.  Please excuse her from 2/15/22 through Monday 2/21/22 due to illness. Tracy may return to work on Tuesday (due to the Monday holiday) 2/22/22 pending test results.       Sincerely,        Ruth Ann Jones, CNP

## 2022-02-18 NOTE — NURSING NOTE
"Chief Complaint   Patient presents with     Cough       Initial /70 (BP Location: Right arm, Patient Position: Sitting, Cuff Size: Adult Large)   Pulse 74   Temp 97.9  F (36.6  C) (Tympanic)   Resp 16   Ht 1.626 m (5' 4\")   Wt 90.7 kg (200 lb)   LMP 10/02/2018 (Approximate)   SpO2 97%   BMI 34.33 kg/m   Estimated body mass index is 34.33 kg/m  as calculated from the following:    Height as of this encounter: 1.626 m (5' 4\").    Weight as of this encounter: 90.7 kg (200 lb).  Medication Reconciliation: complete  Caryn Zelaya LPN    "

## 2022-03-15 ENCOUNTER — OFFICE VISIT (OUTPATIENT)
Dept: FAMILY MEDICINE | Facility: OTHER | Age: 57
End: 2022-03-15
Attending: FAMILY MEDICINE
Payer: COMMERCIAL

## 2022-03-15 VITALS
WEIGHT: 209 LBS | OXYGEN SATURATION: 99 % | HEART RATE: 72 BPM | BODY MASS INDEX: 35.87 KG/M2 | SYSTOLIC BLOOD PRESSURE: 120 MMHG | TEMPERATURE: 97.3 F | RESPIRATION RATE: 18 BRPM | DIASTOLIC BLOOD PRESSURE: 62 MMHG

## 2022-03-15 DIAGNOSIS — E66.01 MORBID OBESITY DUE TO EXCESS CALORIES (H): ICD-10-CM

## 2022-03-15 DIAGNOSIS — I10 BENIGN ESSENTIAL HYPERTENSION: ICD-10-CM

## 2022-03-15 DIAGNOSIS — Z01.818 PREOP GENERAL PHYSICAL EXAM: Primary | ICD-10-CM

## 2022-03-15 DIAGNOSIS — E87.6 HYPOKALEMIA: ICD-10-CM

## 2022-03-15 LAB
ALBUMIN SERPL-MCNC: 3.9 G/DL (ref 3.4–5)
ALBUMIN UR-MCNC: NEGATIVE MG/DL
ALP SERPL-CCNC: 89 U/L (ref 40–150)
ALT SERPL W P-5'-P-CCNC: 79 U/L (ref 0–50)
ANION GAP SERPL CALCULATED.3IONS-SCNC: 6 MMOL/L (ref 3–14)
APPEARANCE UR: CLEAR
AST SERPL W P-5'-P-CCNC: 20 U/L (ref 0–45)
BACTERIA #/AREA URNS HPF: ABNORMAL /HPF
BASOPHILS # BLD AUTO: 0.1 10E3/UL (ref 0–0.2)
BASOPHILS NFR BLD AUTO: 1 %
BILIRUB SERPL-MCNC: 0.3 MG/DL (ref 0.2–1.3)
BILIRUB UR QL STRIP: NEGATIVE
BUN SERPL-MCNC: 23 MG/DL (ref 7–30)
CALCIUM SERPL-MCNC: 9.5 MG/DL (ref 8.5–10.1)
CHLORIDE BLD-SCNC: 103 MMOL/L (ref 94–109)
CO2 SERPL-SCNC: 30 MMOL/L (ref 20–32)
COLOR UR AUTO: ABNORMAL
CREAT SERPL-MCNC: 0.79 MG/DL (ref 0.52–1.04)
EOSINOPHIL # BLD AUTO: 0.3 10E3/UL (ref 0–0.7)
EOSINOPHIL NFR BLD AUTO: 4 %
ERYTHROCYTE [DISTWIDTH] IN BLOOD BY AUTOMATED COUNT: 13.3 % (ref 10–15)
GFR SERPL CREATININE-BSD FRML MDRD: 88 ML/MIN/1.73M2
GLUCOSE BLD-MCNC: 106 MG/DL (ref 70–99)
GLUCOSE UR STRIP-MCNC: NEGATIVE MG/DL
HCT VFR BLD AUTO: 46.2 % (ref 35–47)
HGB BLD-MCNC: 15.8 G/DL (ref 11.7–15.7)
HGB UR QL STRIP: NEGATIVE
IMM GRANULOCYTES # BLD: 0 10E3/UL
IMM GRANULOCYTES NFR BLD: 0 %
KETONES UR STRIP-MCNC: NEGATIVE MG/DL
LEUKOCYTE ESTERASE UR QL STRIP: ABNORMAL
LYMPHOCYTES # BLD AUTO: 2.6 10E3/UL (ref 0.8–5.3)
LYMPHOCYTES NFR BLD AUTO: 35 %
MCH RBC QN AUTO: 29.2 PG (ref 26.5–33)
MCHC RBC AUTO-ENTMCNC: 34.2 G/DL (ref 31.5–36.5)
MCV RBC AUTO: 85 FL (ref 78–100)
MONOCYTES # BLD AUTO: 0.6 10E3/UL (ref 0–1.3)
MONOCYTES NFR BLD AUTO: 8 %
MUCOUS THREADS #/AREA URNS LPF: PRESENT /LPF
NEUTROPHILS # BLD AUTO: 3.9 10E3/UL (ref 1.6–8.3)
NEUTROPHILS NFR BLD AUTO: 52 %
NITRATE UR QL: NEGATIVE
NRBC # BLD AUTO: 0 10E3/UL
NRBC BLD AUTO-RTO: 0 /100
PH UR STRIP: 7 [PH] (ref 4.7–8)
PLATELET # BLD AUTO: 274 10E3/UL (ref 150–450)
POTASSIUM BLD-SCNC: 3 MMOL/L (ref 3.4–5.3)
PROT SERPL-MCNC: 7.8 G/DL (ref 6.8–8.8)
RBC # BLD AUTO: 5.41 10E6/UL (ref 3.8–5.2)
RBC URINE: 0 /HPF
SODIUM SERPL-SCNC: 139 MMOL/L (ref 133–144)
SP GR UR STRIP: 1.01 (ref 1–1.03)
SQUAMOUS EPITHELIAL: 0 /HPF
UROBILINOGEN UR STRIP-MCNC: NORMAL MG/DL
WBC # BLD AUTO: 7.5 10E3/UL (ref 4–11)
WBC URINE: 3 /HPF

## 2022-03-15 PROCEDURE — 80053 COMPREHEN METABOLIC PANEL: CPT | Performed by: FAMILY MEDICINE

## 2022-03-15 PROCEDURE — 99214 OFFICE O/P EST MOD 30 MIN: CPT | Performed by: FAMILY MEDICINE

## 2022-03-15 PROCEDURE — 36415 COLL VENOUS BLD VENIPUNCTURE: CPT | Performed by: FAMILY MEDICINE

## 2022-03-15 PROCEDURE — 81001 URINALYSIS AUTO W/SCOPE: CPT | Performed by: FAMILY MEDICINE

## 2022-03-15 PROCEDURE — 93000 ELECTROCARDIOGRAM COMPLETE: CPT | Mod: 77 | Performed by: INTERNAL MEDICINE

## 2022-03-15 PROCEDURE — 85025 COMPLETE CBC W/AUTO DIFF WBC: CPT | Performed by: FAMILY MEDICINE

## 2022-03-15 RX ORDER — CHLORTHALIDONE 25 MG/1
25 TABLET ORAL DAILY
Qty: 90 TABLET | Refills: 1 | Status: SHIPPED | OUTPATIENT
Start: 2022-03-15

## 2022-03-15 RX ORDER — POTASSIUM CHLORIDE 1500 MG/1
20 TABLET, EXTENDED RELEASE ORAL DAILY
Qty: 90 TABLET | Refills: 1 | Status: SHIPPED | OUTPATIENT
Start: 2022-03-15 | End: 2022-07-27

## 2022-03-15 RX ORDER — METOPROLOL SUCCINATE 50 MG/1
50 TABLET, EXTENDED RELEASE ORAL DAILY
Qty: 90 TABLET | Refills: 1 | Status: SHIPPED | OUTPATIENT
Start: 2022-03-15

## 2022-03-15 RX ORDER — PHENTERMINE HYDROCHLORIDE 30 MG/1
CAPSULE ORAL
Qty: 30 CAPSULE | Refills: 0 | Status: CANCELLED | OUTPATIENT
Start: 2022-03-15

## 2022-03-15 ASSESSMENT — PAIN SCALES - GENERAL: PAINLEVEL: MODERATE PAIN (5)

## 2022-03-15 NOTE — PROGRESS NOTES
[facility] - [place]  [address]  [place] [address]  Phone: [phone]  Primary Provider: Thi Hassan  Pre-op Performing Provider: THI HASSAN      PREOPERATIVE EVALUATION:  Today's date: 3/15/2022    Nadeen Robles is a 56 year old female who presents for a preoperative evaluation.    Surgical Information:  Surgery/Procedure: Right knee replacement  Surgery Location: [facility]  Surgeon: Dr. Springer  Surgery Date: 3/31/2022  Time of Surgery: TBD  Where patient plans to recover: At home with family  Fax number for surgical facility: unknown    Type of Anesthesia Anticipated: to be determined    Assessment & Plan     The proposed surgical procedure is considered INTERMEDIATE risk.    Preop general physical exam  Proceed with surgery.  - EKG 12-lead complete w/read - (Clinic Performed)  - CBC with platelets and differential; Future  - Comprehensive metabolic panel (BMP + Alb, Alk Phos, ALT, AST, Total. Bili, TP); Future  - UA reflex to Microscopic and Culture; Future  - CBC with platelets and differential  - Comprehensive metabolic panel (BMP + Alb, Alk Phos, ALT, AST, Total. Bili, TP)  - UA reflex to Microscopic and Culture    Benign essential hypertension  At goal.  90 day refills done - relocating.  - chlorthalidone (HYGROTON) 25 MG tablet; Take 1 tablet (25 mg) by mouth daily  - metoprolol succinate ER (TOPROL-XL) 50 MG 24 hr tablet; Take 1 tablet (50 mg) by mouth daily Please schedule an office visit.    Morbid obesity due to excess calories (H)  Intermittent Phentermine - not prescribed by me.  Needs to establish when she relocates out of state for weight management program.    Hypokalemia   Start potassium 20 meq daily - script sent.  Related to diuretic.       Risks and Recommendations:  The patient has the following additional risks and recommendations for perioperative complications:   - No identified additional risk factors other than previously addressed    Medication  Instructions:  Patient is to take all scheduled medications on the day of surgery EXCEPT for modifications listed below:   - Beta Blockers: Continue taking on the day of surgery.   - Diuretics: HOLD on the day of surgery.    RECOMMENDATION:  APPROVAL GIVEN to proceed with proposed procedure, without further diagnostic evaluation.        Subjective     HPI related to upcoming procedure: Patient scheduled for right total knee replacement.    Moving to [place].  Moving 4/18/2022.  Family there.  Son adopting 9 year old son.  Helping her move. They will be up here 4/11/2022.  She will fly back there.    Preop Questions 3/15/2022   1. Have you ever had a heart attack or stroke? No   2. Have you ever had surgery on your heart or blood vessels, such as a stent placement, a coronary artery bypass, or surgery on an artery in your head, neck, heart, or legs? No   3. Do you have chest pain with activity? No   4. Do you have a history of  heart failure? No   5. Do you currently have a cold, bronchitis or symptoms of other infection? No   6. Do you have a cough, shortness of breath, or wheezing? No   7. Do you or anyone in your family have previous history of blood clots? No   8. Do you or does anyone in your family have a serious bleeding problem such as prolonged bleeding following surgeries or cuts? No   9. Have you ever had problems with anemia or been told to take iron pills? No   10. Have you had any abnormal blood loss such as black, tarry or bloody stools, or abnormal vaginal bleeding? No   11. Have you ever had a blood transfusion? No   12. Are you willing to have a blood transfusion if it is medically needed before, during, or after your surgery? Yes   13. Have you or any of your relatives ever had problems with anesthesia? YES - nausea, vomiting   14. Do you have sleep apnea, excessive snoring or daytime drowsiness? No   15. Do you have any artifical heart valves or other implanted medical devices like a  pacemaker, defibrillator, or continuous glucose monitor? No   16. Do you have artificial joints? No   17. Are you allergic to latex? No   18. Is there any chance that you may be pregnant? No     Health Care Directive:  Patient does not have a Health Care Directive or Living Will: Discussed advance care planning with patient; however, patient declined at this time.    Preoperative Review of :   reviewed - controlled substances reflected in medication list.    Phentermine - last fill 11/2021.    Status of Chronic Conditions:  See problem list for active medical problems.  Problems all longstanding and stable, except as noted/documented.  See ROS for pertinent symptoms related to these conditions.    HYPERTENSION - Patient has longstanding history of HTN , currently denies any symptoms referable to elevated blood pressure. Specifically denies chest pain, palpitations, dyspnea, orthopnea, PND or peripheral edema. Blood pressure readings have been in normal range. Current medication regimen is as listed below. Patient denies any side effects of medication.     OBESITY - prior use of Phentermine, not prescribed by me.    Review of Systems  Constitutional, neuro, ENT, endocrine, pulmonary, cardiac, gastrointestinal, genitourinary, musculoskeletal, integument and psychiatric systems are negative, except as otherwise noted.    Patient Active Problem List    Diagnosis Date Noted     Hyperlipidemia, unspecified hyperlipidemia type 05/26/2021     Priority: Medium     Papanicolaou smear of cervix with atypical squamous cells of undetermined significance (ASC-US) 08/16/2017     Priority: Medium     ACP (advance care planning) 01/06/2017     Priority: Medium     Advance Care Planning 1/6/2017: ACP Review of Chart / Resources Provided:  Reviewed chart for advance care plan.  Nadeen Robles has been provided information and resources to begin or update their advance care plan.  Added by Venus Mckenna              Hypokalemia 10/16/2011     Priority: Medium     Hypertension goal BP (blood pressure) < 140/90 01/04/2011     Priority: Medium     Obesity 03/26/2010     Priority: Medium     Problem list name updated by automated process. Provider to review        Past Medical History:   Diagnosis Date     Autoimmune hepatitis (H)     2005; resolved     HTN (hypertension)      Migraine     no longer active     Obesity      Past Surgical History:   Procedure Laterality Date     HC REMOVAL GALLBLADDER  01/01/1990     Current Outpatient Medications   Medication Sig Dispense Refill     chlorthalidone (HYGROTON) 25 MG tablet Take 1 tablet (25 mg) by mouth daily 90 tablet 1     metoprolol succinate ER (TOPROL-XL) 50 MG 24 hr tablet Take 1 tablet (50 mg) by mouth daily Please schedule an office visit. 90 tablet 1       Allergies   Allergen Reactions     Nkda [No Known Drug Allergies]         Social History     Tobacco Use     Smoking status: Never Smoker     Smokeless tobacco: Never Used     Tobacco comment: no 2nd hand smoke exposure   Substance Use Topics     Alcohol use: Yes     Comment: rarely     Family History   Problem Relation Age of Onset     Diabetes Mother      Other Cancer Mother 78        stage 4 lung cancer     Diabetes Father      Cerebrovascular Disease Father 75     Cardiovascular Father 75        heart attack     History   Drug Use No         Objective     /62   Pulse 72   Temp 97.3  F (36.3  C) (Tympanic)   Resp 18   Wt 94.8 kg (209 lb)   LMP 10/02/2018 (Approximate)   SpO2 99%   BMI 35.87 kg/m      Physical Exam    GENERAL APPEARANCE: alert, no distress, cooperative and over weight     EYES: EOMI, PERRL     HENT: ear canals and TM's normal and nose and mouth without ulcers or lesions     NECK: no adenopathy, no asymmetry, masses, or scars and thyroid normal to palpation     RESP: lungs clear to auscultation - no rales, rhonchi or wheezes     CV: regular rates and rhythm, normal S1 S2, no S3 or S4 and no  murmur, click or rub     ABDOMEN:  soft, nontender, no HSM or masses and bowel sounds normal     MS: extremities normal- no gross deformities noted, no evidence of inflammation in joints, FROM in all extremities.     SKIN: no suspicious lesions or rashes     NEURO: Normal strength and tone, sensory exam grossly normal, mentation intact and speech normal     PSYCH: mentation appears normal. and affect normal/bright     LYMPHATICS: No cervical adenopathy    Recent Labs   Lab Test 05/26/21  1020   HGB 16.0*         POTASSIUM 3.6   CR 0.76        Diagnostics:  Recent Results (from the past 24 hour(s))   Comprehensive metabolic panel (BMP + Alb, Alk Phos, ALT, AST, Total. Bili, TP)    Collection Time: 03/15/22  4:29 PM   Result Value Ref Range    Sodium 139 133 - 144 mmol/L    Potassium 3.0 (L) 3.4 - 5.3 mmol/L    Chloride 103 94 - 109 mmol/L    Carbon Dioxide (CO2) 30 20 - 32 mmol/L    Anion Gap 6 3 - 14 mmol/L    Urea Nitrogen 23 7 - 30 mg/dL    Creatinine 0.79 0.52 - 1.04 mg/dL    Calcium 9.5 8.5 - 10.1 mg/dL    Glucose 106 (H) 70 - 99 mg/dL    Alkaline Phosphatase 89 40 - 150 U/L    AST 20 0 - 45 U/L    ALT 79 (H) 0 - 50 U/L    Protein Total 7.8 6.8 - 8.8 g/dL    Albumin 3.9 3.4 - 5.0 g/dL    Bilirubin Total 0.3 0.2 - 1.3 mg/dL    GFR Estimate 88 >60 mL/min/1.73m2   CBC with platelets and differential    Collection Time: 03/15/22  4:29 PM   Result Value Ref Range    WBC Count 7.5 4.0 - 11.0 10e3/uL    RBC Count 5.41 (H) 3.80 - 5.20 10e6/uL    Hemoglobin 15.8 (H) 11.7 - 15.7 g/dL    Hematocrit 46.2 35.0 - 47.0 %    MCV 85 78 - 100 fL    MCH 29.2 26.5 - 33.0 pg    MCHC 34.2 31.5 - 36.5 g/dL    RDW 13.3 10.0 - 15.0 %    Platelet Count 274 150 - 450 10e3/uL    % Neutrophils 52 %    % Lymphocytes 35 %    % Monocytes 8 %    % Eosinophils 4 %    % Basophils 1 %    % Immature Granulocytes 0 %    NRBCs per 100 WBC 0 <1 /100    Absolute Neutrophils 3.9 1.6 - 8.3 10e3/uL    Absolute Lymphocytes 2.6 0.8 - 5.3  10e3/uL    Absolute Monocytes 0.6 0.0 - 1.3 10e3/uL    Absolute Eosinophils 0.3 0.0 - 0.7 10e3/uL    Absolute Basophils 0.1 0.0 - 0.2 10e3/uL    Absolute Immature Granulocytes 0.0 <=0.4 10e3/uL    Absolute NRBCs 0.0 10e3/uL   UA reflex to Microscopic and Culture    Collection Time: 03/15/22  4:30 PM    Specimen: Urine, Midstream   Result Value Ref Range    Color Urine Straw Colorless, Straw, Light Yellow, Yellow    Appearance Urine Clear Clear    Glucose Urine Negative Negative mg/dL    Bilirubin Urine Negative Negative    Ketones Urine Negative Negative mg/dL    Specific Gravity Urine 1.012 1.003 - 1.035    Blood Urine Negative Negative    pH Urine 7.0 4.7 - 8.0    Protein Albumin Urine Negative Negative mg/dL    Urobilinogen Urine Normal Normal, 2.0 mg/dL    Nitrite Urine Negative Negative    Leukocyte Esterase Urine Small (A) Negative    Bacteria Urine Few (A) None Seen /HPF    Mucus Urine Present (A) None Seen /LPF    RBC Urine 0 <=2 /HPF    WBC Urine 3 <=5 /HPF    Squamous Epithelials Urine 0 <=1 /HPF      EKG: appears normal, NSR, normal axis, normal intervals, no acute ST/T changes c/w ischemia, unchanged from previous tracings, possible LVH - may be normal variant    Revised Cardiac Risk Index (RCRI):  The patient has the following serious cardiovascular risks for perioperative complications:   - No serious cardiac risks = 0 points     RCRI Interpretation: 0 points: Class I (very low risk - 0.4% complication rate)           Signed Electronically by: Thi Petersen MD  Copy of this evaluation report is provided to requesting physician.

## 2022-03-15 NOTE — NURSING NOTE
"Chief Complaint   Patient presents with     Pre-Op Exam       Initial /62   Pulse 72   Temp 97.3  F (36.3  C) (Tympanic)   Resp 18   Wt 94.8 kg (209 lb)   LMP 10/02/2018 (Approximate)   SpO2 99%   BMI 35.87 kg/m   Estimated body mass index is 35.87 kg/m  as calculated from the following:    Height as of 2/18/22: 1.626 m (5' 4\").    Weight as of this encounter: 94.8 kg (209 lb).  Medication Reconciliation: complete  Dianne Garcia LPN    "

## 2022-03-15 NOTE — PATIENT INSTRUCTIONS
Will call with notable lab findings.  EKG completed.    COVID testing to be completed.    Continue your metoprolol.  Hold the Chlorthalidone on the day of surgery.

## 2022-03-16 ENCOUNTER — TELEPHONE (OUTPATIENT)
Dept: FAMILY MEDICINE | Facility: OTHER | Age: 57
End: 2022-03-16
Payer: COMMERCIAL

## 2022-03-16 NOTE — TELEPHONE ENCOUNTER
Notified patient of results. Wondering if she needs to start potassium after surgery as she was told to stop all medications until then.

## 2022-03-16 NOTE — TELEPHONE ENCOUNTER
----- Message from Thi Hassan MD sent at 3/15/2022  5:17 PM CDT -----  Notify of labs - overall stable - but potassium is low.  Likely from her diuretic chlorthalidone.  Start potassium supplement.  20 meq daily.  Script sent.

## 2022-04-12 ENCOUNTER — TRANSFERRED RECORDS (OUTPATIENT)
Dept: HEALTH INFORMATION MANAGEMENT | Facility: CLINIC | Age: 57
End: 2022-04-12

## 2024-10-31 DIAGNOSIS — Z12.11 COLON CANCER SCREENING: ICD-10-CM

## 2024-11-14 ENCOUNTER — ORDERS ONLY (AUTO-RELEASED) (OUTPATIENT)
Dept: FAMILY MEDICINE | Facility: CLINIC | Age: 59
End: 2024-11-14

## 2024-11-14 DIAGNOSIS — Z12.11 COLON CANCER SCREENING: ICD-10-CM
